# Patient Record
Sex: FEMALE | Race: WHITE | NOT HISPANIC OR LATINO | Employment: FULL TIME | URBAN - NONMETROPOLITAN AREA
[De-identification: names, ages, dates, MRNs, and addresses within clinical notes are randomized per-mention and may not be internally consistent; named-entity substitution may affect disease eponyms.]

---

## 2017-07-11 ENCOUNTER — TRANSFERRED RECORDS (OUTPATIENT)
Dept: HEALTH INFORMATION MANAGEMENT | Facility: OTHER | Age: 52
End: 2017-07-11

## 2017-10-02 ENCOUNTER — TRANSFERRED RECORDS (OUTPATIENT)
Dept: HEALTH INFORMATION MANAGEMENT | Facility: OTHER | Age: 52
End: 2017-10-02

## 2017-10-09 ENCOUNTER — TRANSFERRED RECORDS (OUTPATIENT)
Dept: HEALTH INFORMATION MANAGEMENT | Facility: OTHER | Age: 52
End: 2017-10-09

## 2018-03-01 ENCOUNTER — TELEPHONE (OUTPATIENT)
Dept: FAMILY MEDICINE | Facility: OTHER | Age: 53
End: 2018-03-01

## 2018-03-01 ENCOUNTER — TELEPHONE (OUTPATIENT)
Dept: SURGERY | Facility: OTHER | Age: 53
End: 2018-03-01

## 2018-03-01 ENCOUNTER — OFFICE VISIT (OUTPATIENT)
Dept: FAMILY MEDICINE | Facility: OTHER | Age: 53
End: 2018-03-01
Attending: PHYSICIAN ASSISTANT
Payer: COMMERCIAL

## 2018-03-01 VITALS
BODY MASS INDEX: 21.92 KG/M2 | DIASTOLIC BLOOD PRESSURE: 84 MMHG | HEART RATE: 60 BPM | SYSTOLIC BLOOD PRESSURE: 138 MMHG | WEIGHT: 131.6 LBS | HEIGHT: 65 IN | TEMPERATURE: 98 F

## 2018-03-01 DIAGNOSIS — E55.9 HYPOVITAMINOSIS D: ICD-10-CM

## 2018-03-01 DIAGNOSIS — Z01.419 PAP TEST, AS PART OF ROUTINE GYNECOLOGICAL EXAMINATION: ICD-10-CM

## 2018-03-01 DIAGNOSIS — I48.0 PAROXYSMAL ATRIAL FIBRILLATION (H): ICD-10-CM

## 2018-03-01 DIAGNOSIS — Z23 NEED FOR TDAP VACCINATION: ICD-10-CM

## 2018-03-01 DIAGNOSIS — Z13.220 SCREENING CHOLESTEROL LEVEL: ICD-10-CM

## 2018-03-01 DIAGNOSIS — Z00.00 ROUTINE HISTORY AND PHYSICAL EXAMINATION OF ADULT: Primary | ICD-10-CM

## 2018-03-01 DIAGNOSIS — Z00.00 HEALTHCARE MAINTENANCE: Primary | ICD-10-CM

## 2018-03-01 DIAGNOSIS — Z12.31 ENCOUNTER FOR SCREENING MAMMOGRAM FOR BREAST CANCER: ICD-10-CM

## 2018-03-01 DIAGNOSIS — R15.9 INCONTINENCE OF FECES, UNSPECIFIED FECAL INCONTINENCE TYPE: ICD-10-CM

## 2018-03-01 DIAGNOSIS — M06.9 RHEUMATOID ARTHRITIS, INVOLVING UNSPECIFIED SITE, UNSPECIFIED RHEUMATOID FACTOR PRESENCE: ICD-10-CM

## 2018-03-01 DIAGNOSIS — R19.7 DIARRHEA, UNSPECIFIED TYPE: ICD-10-CM

## 2018-03-01 LAB
ALBUMIN SERPL-MCNC: 4 G/DL (ref 3.5–5.7)
ALP SERPL-CCNC: 73 U/L (ref 34–104)
ALT SERPL W P-5'-P-CCNC: 11 U/L (ref 7–52)
ANION GAP SERPL CALCULATED.3IONS-SCNC: 5 MMOL/L (ref 3–14)
AST SERPL W P-5'-P-CCNC: 14 U/L (ref 13–39)
BILIRUB SERPL-MCNC: 0.2 MG/DL (ref 0.3–1)
BUN SERPL-MCNC: 20 MG/DL (ref 7–25)
CALCIUM SERPL-MCNC: 9.5 MG/DL (ref 8.6–10.3)
CHLORIDE SERPL-SCNC: 107 MMOL/L (ref 98–107)
CHOLEST SERPL-MCNC: 179 MG/DL
CO2 SERPL-SCNC: 28 MMOL/L (ref 21–31)
CREAT SERPL-MCNC: 0.72 MG/DL (ref 0.6–1.2)
DEPRECATED CALCIDIOL+CALCIFEROL SERPL-MC: 29.4 NG/ML
ERYTHROCYTE [DISTWIDTH] IN BLOOD BY AUTOMATED COUNT: 13.3 % (ref 10–15)
GFR SERPL CREATININE-BSD FRML MDRD: 85 ML/MIN/1.7M2
GLUCOSE SERPL-MCNC: 90 MG/DL (ref 70–105)
HCT VFR BLD AUTO: 36.3 % (ref 35–47)
HDLC SERPL-MCNC: 54 MG/DL (ref 23–92)
HGB BLD-MCNC: 12.2 G/DL (ref 11.7–15.7)
LDLC SERPL CALC-MCNC: 114 MG/DL
MCH RBC QN AUTO: 30.7 PG (ref 26.5–33)
MCHC RBC AUTO-ENTMCNC: 33.6 G/DL (ref 31.5–36.5)
MCV RBC AUTO: 91 FL (ref 78–100)
NONHDLC SERPL-MCNC: 125 MG/DL
PLATELET # BLD AUTO: 259 10E9/L (ref 150–450)
POTASSIUM SERPL-SCNC: 4.4 MMOL/L (ref 3.5–5.1)
PROT SERPL-MCNC: 8.1 G/DL (ref 6.4–8.9)
RBC # BLD AUTO: 3.97 10E12/L (ref 3.8–5.2)
SODIUM SERPL-SCNC: 140 MMOL/L (ref 134–144)
TRIGL SERPL-MCNC: 57 MG/DL
WBC # BLD AUTO: 6 10E9/L (ref 4–11)

## 2018-03-01 PROCEDURE — G0463 HOSPITAL OUTPT CLINIC VISIT: HCPCS

## 2018-03-01 PROCEDURE — 82306 VITAMIN D 25 HYDROXY: CPT | Performed by: PHYSICIAN ASSISTANT

## 2018-03-01 PROCEDURE — G0123 SCREEN CERV/VAG THIN LAYER: HCPCS | Performed by: PHYSICIAN ASSISTANT

## 2018-03-01 PROCEDURE — 90471 IMMUNIZATION ADMIN: CPT

## 2018-03-01 PROCEDURE — 85027 COMPLETE CBC AUTOMATED: CPT | Performed by: PHYSICIAN ASSISTANT

## 2018-03-01 PROCEDURE — 36415 COLL VENOUS BLD VENIPUNCTURE: CPT | Performed by: PHYSICIAN ASSISTANT

## 2018-03-01 PROCEDURE — 80061 LIPID PANEL: CPT | Performed by: PHYSICIAN ASSISTANT

## 2018-03-01 PROCEDURE — 80053 COMPREHEN METABOLIC PANEL: CPT | Performed by: PHYSICIAN ASSISTANT

## 2018-03-01 PROCEDURE — 90715 TDAP VACCINE 7 YRS/> IM: CPT | Performed by: PHYSICIAN ASSISTANT

## 2018-03-01 PROCEDURE — 88142 CYTOPATH C/V THIN LAYER: CPT | Mod: ZL | Performed by: PHYSICIAN ASSISTANT

## 2018-03-01 PROCEDURE — 87624 HPV HI-RISK TYP POOLED RSLT: CPT | Performed by: PHYSICIAN ASSISTANT

## 2018-03-01 PROCEDURE — 99386 PREV VISIT NEW AGE 40-64: CPT | Mod: 25 | Performed by: PHYSICIAN ASSISTANT

## 2018-03-01 RX ORDER — METOPROLOL TARTRATE 50 MG
TABLET ORAL
Qty: 60 TABLET | Refills: 1 | COMMUNITY
Start: 2018-03-01 | End: 2018-03-01

## 2018-03-01 RX ORDER — METOPROLOL TARTRATE 25 MG/1
25 TABLET, FILM COATED ORAL DAILY
COMMUNITY
End: 2018-03-01

## 2018-03-01 RX ORDER — MELOXICAM 15 MG/1
15 TABLET ORAL DAILY
COMMUNITY
End: 2018-03-01

## 2018-03-01 RX ORDER — METOPROLOL TARTRATE 25 MG/1
25 TABLET, FILM COATED ORAL DAILY
Qty: 90 TABLET | Refills: 3 | Status: ON HOLD | OUTPATIENT
Start: 2018-03-01 | End: 2018-06-28

## 2018-03-01 RX ORDER — METOPROLOL TARTRATE 50 MG
TABLET ORAL
Qty: 60 TABLET | Refills: 1 | Status: SHIPPED | OUTPATIENT
Start: 2018-03-01 | End: 2018-08-08

## 2018-03-01 RX ORDER — MELOXICAM 15 MG/1
15 TABLET ORAL DAILY
Qty: 90 TABLET | Refills: 3 | Status: ON HOLD | OUTPATIENT
Start: 2018-03-01 | End: 2018-06-28

## 2018-03-01 RX ORDER — METOPROLOL TARTRATE 50 MG
TABLET ORAL
Qty: 60 TABLET | Refills: 1 | Status: SHIPPED | OUTPATIENT
Start: 2018-03-01 | End: 2018-03-01

## 2018-03-01 NOTE — TELEPHONE ENCOUNTER
Patient referred by Leela Carrion to General Surgery for diarrhea and incontinence of feces . Would you    like to see her in clinic first? Please advise.  Thank you.  Sarah Rico on 3/1/2018 at 9:03 AM

## 2018-03-01 NOTE — TELEPHONE ENCOUNTER
Screening Questions for the Scheduling of Screening Colonoscopies   (If Colonoscopy is diagnostic, Provider should review the chart before scheduling.)  Are you younger than 50 or older than 80?  No   Do you take aspirin or fish oil?  No  (if yes, tell patient to stop 1 week prior to Colonoscopy)  Do you take warfarin (Coumadin), clopidogrel (Plavix), apixaban (Eliquis), dabigatram (Pradaxa), rivaroxaban (Xarelto) or any blood thinner? No   Do you use oxygen at home?  NO   Do you have kidney disease? NO  Are you on dialysis? NO   Have you had a stroke or heart attack in the last year? NO   Have you had a stent in your heart or any blood vessel in the last year? NO   Have you had a transplant of any organ? NO   Have you had a colonoscopy or upper endoscopy (EGD) before? NO          When?  NO  Date of scheduled Colonoscopy. 03/12/2018  Provider KISHAN   Pharmacy [facility]

## 2018-03-01 NOTE — TELEPHONE ENCOUNTER
Pharmacy asking about PRN metoprolol They would like a frequency on this order.  MIMA Bronson........................3/1/2018  11:46 AM

## 2018-03-01 NOTE — PATIENT INSTRUCTIONS
"Referred to general surgery for a consult and colonoscopy.     Referred to rheumatology.     Referred for mammogram.     Labs pending.     Gave tetanus vaccine.     Healthy Strategies  1. Eat at least 3 meals a day and never skip breakfast.  2. Eat more slowly.  3. Decrease portion size.  4. Provide structure by using meal replacement bars or shakes, and/or low calorie frozenmeals.  5. For good nutrition incorporate fruit, vegetables, whole grains, lean protein, and low-fat dairy.  6. Remove trigger foods from yourenvironment to avoid impulse eating.  7. Increase physical activity: get a pedometer and aim for 10,000 steps a day or 30-35 minutes of activity 5 days per week.  8. Weigh yourself daily or at least weekly.  9. Keep a record of what you eat and your activity.  10. Establish a support system such as afriend, group or program.    11. Read Kelby Guevara's \"Eat to Live\". Remember it is important to have a minimum of 1200 calories a day, okay to use olive oil, 40 grams of fiber daily. No more than two servings (the size of your palm) of red meat a week.     Please consider the following general health recommendations:    Schedule a mammogram annually starting at the age of 40 years old unless recommended earlier by your primary care provider. Come for a general exam once yearly.    Eat a quality diet (generally, low in simple sugars, starches, cholesterol and saturated fat.)    Please get 1500 mg of calcium in divided doses with 1500 units vitamin D in your diet daily. Take supplements as needed to obtain full recommended amounts.     Stay physically active. Regular walking or other exercise is one of the best ways to minimize pain of arthritis; maintain independence and mobility; maintain bone strength; maintain conditioning of your heart. Find something you enjoy and a friend to do it with you.    Maintain ideal weight. Your Body mass index is Body mass index is 21.73 kg/(m^2).. Generally a BMI of 20-25 is " considered ideal. Overweight is defined as 25-30, obese is 30-35 and markedly obese is greater than 35.    Apply sun block (SPF 25 or greater) on exposed skin anytime you are out in the sun to prevent skin cancer.     Wear a seatbelt whenever you are in a car.    Consider a bone density study every 2-5 years to see if you are at increased risk for fracture. If you have osteoporosis, medicine to strengthen your bones can significantly reduce your risk of fracture, back pain, and loss of height.    Colonoscopy (an exam of the colon) is recommended every 10 years after the age of 50 to screen for colon cancer. (More often if you are at increased risk.)    Obtain a flu shot every fall.    Receive a pneumonia shot series after the age of 65 (repeat in 5 years if you have other risk factors). This does not prevent all types of pneumonia, but reduces the risk of the worst bacterial causes of pneumonia.    You should have a tetanus booster at least once every 10 years.    A vaccine to reduce your chances of getting shingles is available if you are over 50. Information about the vaccine is available through the clinic or at:  (http://www.nlm.nih.gov/medlineplus/druginfo/medmaster/a557683.html)    Check blood sugar annually. Cholesterol annually unless you have had a normal level when last checked within 5 years.     I recommend that you have a general physical exam every year. You should have a pap test every 3 years between the ages of 21 and 30 and every 3-5 years between the ages of 30 and 65 depending on your test unless you have had previous abnormal pap smears, (in these cases the exams and PAP's should be done on a schedule as recommended by your primary care provider). If you have had hysterectomy in the past, your future Pap plan may be different.

## 2018-03-01 NOTE — LETTER
Isabel Antione  [address] [place] [id number]    3/6/2018      Dear Ms. Talbot,      We've received the results back from the laboratory for the samples you gave in clinic.      Your white blood cell count, hemoglobin, electrolytes, kidney function, liver function, and cholesterol are normal.    Your vitamin D was on the lower end of normal.  I would recommend taking in 1500 units of vitamin D daily in order to increase her supply.    Your Pap test and HPV test are normal.  Please have this important test repeated in 5 years.    Please contact us at [phone] with any questions or concerns that you have.    I attached your lab results for your records.        Take Care,         Leela Carrion PA-C    Resulted Orders   CBC with platelets   Result Value Ref Range    WBC 6.0 4.0 - 11.0 10e9/L    RBC Count 3.97 3.8 - 5.2 10e12/L    Hemoglobin 12.2 11.7 - 15.7 g/dL    Hematocrit 36.3 35.0 - 47.0 %    MCV 91 78 - 100 fl    MCH 30.7 26.5 - 33.0 pg    MCHC 33.6 31.5 - 36.5 g/dL    RDW 13.3 10.0 - 15.0 %    Platelet Count 259 150 - 450 10e9/L   Comprehensive metabolic panel (BMP + Alb, Alk Phos, ALT, AST, Total. Bili, TP)   Result Value Ref Range    Sodium 140 134 - 144 mmol/L    Potassium 4.4 3.5 - 5.1 mmol/L    Chloride 107 98 - 107 mmol/L    Carbon Dioxide 28 21 - 31 mmol/L    Anion Gap 5 3 - 14 mmol/L    Glucose 90 70 - 105 mg/dL    Urea Nitrogen 20 7 - 25 mg/dL    Creatinine 0.72 0.60 - 1.20 mg/dL    GFR Estimate 85 >60 mL/min/1.7m2    GFR Estimate If Black >90 >60 mL/min/1.7m2    Calcium 9.5 8.6 - 10.3 mg/dL    Bilirubin Total 0.2 (L) 0.3 - 1.0 mg/dL    Albumin 4.0 3.5 - 5.7 g/dL    Protein Total 8.1 6.4 - 8.9 g/dL    Alkaline Phosphatase 73 34 - 104 U/L    ALT 11 7 - 52 U/L    AST 14 13 - 39 U/L   Vitamin D Total GH   Result Value Ref Range    Vitamin D Total 29.4 ng/mL      Comment:      Comments:  Deficiency:             <10 ng/mL  Insufficiency:        10-29 ng/mL  Sufficiency:           ng/mL  Possible Toxicity:     >100 ng/mL     HPV High Risk Types DNA Cervical   Result Value Ref Range    HPV Source ThinPrep       Comment:      CORRECTED ON 03/06 AT 0725: PREVIOUSLY REPORTED AS [facility]    HPV 16 DNA Negative NEG^Negative    HPV 18 DNA Negative NEG^Negative    Other HR HPV Negative NEG^Negative    Final Diagnosis This patient's sample is negative for HPV DNA.       Comment:      (Note)  METHODOLOGY:  The Roche edith 4800 system uses automated extraction,   simultaneous amplification of HPV (L1 region) and beta-globin,    followed by  real time detection of fluorescent labeled HPV and beta   globin using specific oligonucleotide probes . The test specifically   identifies types HPV 16 DNA and HPV 18 DNA while concurrently   detecting the rest of the high risk types (31, 33, 35, 39, 45, 51,   52, 56, 58, 59, 66 or 68).  COMMENTS:  This test is not intended for use as a screening device   for women under age 30 with normal cervical cytology.  Results should   be correlated with cytologic and histologic findings. Close clinical   followup is recommended.      Specimen Description Cervical Cells       Comment:      BG18 [id number]   Lipid Profile (Chol, Trig, HDL, LDL calc) - FASTING   Result Value Ref Range    Cholesterol 179 <200 mg/dL    Triglycerides 57 <150 mg/dL    HDL Cholesterol 54 23 - 92 mg/dL    LDL Cholesterol Calculated 114 (H) <100 mg/dL      Comment:      Above desirable:  100-129 mg/dl  Borderline High:  130-159 mg/dL  High:             160-189 mg/dL  Very high:       >189 mg/dl      Non HDL Cholesterol 125 <130 mg/dL

## 2018-03-01 NOTE — MR AVS SNAPSHOT
"              After Visit Summary   3/1/2018    Isabel Talbot    MRN: [id number]           Patient Information     Date Of Birth          1965        Visit Information        Provider Department      3/1/2018 8:00 AM Leela Carrion PA-C [facility]        Today's Diagnoses     Routine history and physical examination of adult    -  1    Paroxysmal atrial fibrillation (H)        Rheumatoid arthritis, involving unspecified site, unspecified rheumatoid factor presence (H)        Hypovitaminosis D        Need for Tdap vaccination        Diarrhea, unspecified type        Incontinence of feces, unspecified fecal incontinence type        Encounter for screening mammogram for breast cancer        Pap test, as part of routine gynecological examination        Screening cholesterol level          Care Instructions    Referred to general surgery for a consult and colonoscopy.     Referred to rheumatology.     Referred for mammogram.     Labs pending.     Gave tetanus vaccine.     Healthy Strategies  1. Eat at least 3 meals a day and never skip breakfast.  2. Eat more slowly.  3. Decrease portion size.  4. Provide structure by using meal replacement bars or shakes, and/or low calorie frozenmeals.  5. For good nutrition incorporate fruit, vegetables, whole grains, lean protein, and low-fat dairy.  6. Remove trigger foods from yourenvironment to avoid impulse eating.  7. Increase physical activity: get a pedometer and aim for 10,000 steps a day or 30-35 minutes of activity 5 days per week.  8. Weigh yourself daily or at least weekly.  9. Keep a record of what you eat and your activity.  10. Establish a support system such as afriend, group or program.    11. Read Kelby Guevara's \"Eat to Live\". Remember it is important to have a minimum of 1200 calories a day, okay to use olive oil, 40 grams of fiber daily. No more than two servings (the size of your palm) of red meat a week.     Please consider the " following general health recommendations:    Schedule a mammogram annually starting at the age of 40 years old unless recommended earlier by your primary care provider. Come for a general exam once yearly.    Eat a quality diet (generally, low in simple sugars, starches, cholesterol and saturated fat.)    Please get 1500 mg of calcium in divided doses with 1500 units vitamin D in your diet daily. Take supplements as needed to obtain full recommended amounts.     Stay physically active. Regular walking or other exercise is one of the best ways to minimize pain of arthritis; maintain independence and mobility; maintain bone strength; maintain conditioning of your heart. Find something you enjoy and a friend to do it with you.    Maintain ideal weight. Your Body mass index is Body mass index is 21.73 kg/(m^2).. Generally a BMI of 20-25 is considered ideal. Overweight is defined as 25-30, obese is 30-35 and markedly obese is greater than 35.    Apply sun block (SPF 25 or greater) on exposed skin anytime you are out in the sun to prevent skin cancer.     Wear a seatbelt whenever you are in a car.    Consider a bone density study every 2-5 years to see if you are at increased risk for fracture. If you have osteoporosis, medicine to strengthen your bones can significantly reduce your risk of fracture, back pain, and loss of height.    Colonoscopy (an exam of the colon) is recommended every 10 years after the age of 50 to screen for colon cancer. (More often if you are at increased risk.)    Obtain a flu shot every fall.    Receive a pneumonia shot series after the age of 65 (repeat in 5 years if you have other risk factors). This does not prevent all types of pneumonia, but reduces the risk of the worst bacterial causes of pneumonia.    You should have a tetanus booster at least once every 10 years.    A vaccine to reduce your chances of getting shingles is available if you are over 50. Information about the vaccine is  available through the clinic or at:  (http://www.nlm.nih.gov/medlineplus/druginfo/medmaster/r228247.html)    Check blood sugar annually. Cholesterol annually unless you have had a normal level when last checked within 5 years.     I recommend that you have a general physical exam every year. You should have a pap test every 3 years between the ages of 21 and 30 and every 3-5 years between the ages of 30 and 65 depending on your test unless you have had previous abnormal pap smears, (in these cases the exams and PAP's should be done on a schedule as recommended by your primary care provider). If you have had hysterectomy in the past, your future Pap plan may be different.                   Follow-ups after your visit        Additional Services     GASTROENTEROLOGY ADULT REF PROCEDURE ONLY Other ([place])       Last Lab Result: No results found for: CR  Body mass index is 21.73 kg/(m^2).     Needed:  No  Language:  English    Patient will be contacted to schedule procedure.     Please be aware that coverage of these services is subject to the terms and limitations of your health insurance plan.  Call member services at your health plan with any benefit or coverage questions.  Any procedures must be performed at a [facility] facility OR coordinated by your clinic's referral office.    Please bring the following with you to your appointment:    (1) Any X-Rays, CTs or MRIs which have been performed.  Contact the facility where they were done to arrange for  prior to your scheduled appointment.    (2) List of current medications   (3) This referral request   (4) Any documents/labs given to you for this referral            GENERAL SURG ADULT REFERRAL       Your provider has referred you to: [facility]: [facility] [phone]   [email or web address]    Please be aware that coverage of these services is subject to the terms and limitations of your health insurance  plan.  Call member services at your health plan with any benefit or coverage questions.      Please bring the following with you to your appointment:    (1) Any X-Rays, CTs or MRIs which have been performed.  Contact the facility where they were done to arrange for  prior to your scheduled appointment.   (2) List of current medications   (3) This referral request   (4) Any documents/labs given to you for this referral            RHEUMATOLOGY REFERRAL       Your provider has referred you to: [facility]    Please be aware that coverage of these services is subject to the terms and limitations of your health insurance plan.  Call member services at your health plan with any benefit or coverage questions.      Please bring the following with you to your appointment:    (1) Any X-Rays, CTs or MRIs which have been performed.  Contact the facility where they were done to arrange for  prior to your scheduled appointment.    (2) List of current medications   (3) This referral request   (4) Any documents/labs given to you for this referral                  Follow-up notes from your care team     Return in about 1 year (around 3/1/2019) for Physical Exam.      Your next 10 appointments already scheduled     Mar 01, 2018  1:50 PM CST   New Visit with Gerard Travis MD   [facility] ([facility]    [address]  [facility] [id number] [phone]              Future tests that were ordered for you today     Open Future Orders        Priority Expected Expires Ordered    Clostridium difficile Toxin B PCR Routine  3/31/2018 3/1/2018    Giardia antigen Routine  3/1/2019 3/1/2018    H Pylori antigen, stool Routine  3/31/2018 3/1/2018    Stool culture SSCE Routine  3/1/2019 3/1/2018    Ova and Parasite Exam Routine Routine  3/1/2019 3/1/2018    MA Screening Digital Bilateral Routine  3/1/2019 3/1/2018            Who to contact     If you have questions or need  "follow up information about today's clinic visit or your schedule please contact [facility] directly at [phone].  Normal or non-critical lab and imaging results will be communicated to you by [employer], letter or phone within 4 business days after the clinic has received the results. If you do not hear from us within 7 days, please contact the clinic through [employer] or phone. If you have a critical or abnormal lab result, we will notify you by phone as soon as possible.  Submit refill requests through [employer] or call your pharmacy and they will forward the refill request to us. Please allow 3 business days for your refill to be completed.          Additional Information About Your Visit        [employer] Information     [employer]at lets you send messages to your doctor, view your test results, renew your prescriptions, schedule appointments and more. To sign up, go to [email or web address] . Click on \"Log in\" on the left side of the screen, which will take you to the Welcome page. Then click on \"Sign up Now\" on the right side of the page.     You will be asked to enter the access code listed below, as well as some personal information. Please follow the directions to create your username and password.     Your access code is: [email or web address]  Expires: 2018  9:24 AM     Your access code will  in 90 days. If you need help or a new code, please call your [facility] clinic or [phone].        Care EveryWhere ID     This is your Care EveryWhere ID. This could be used by other organizations to access your [facility] medical records  [id number]        Your Vitals Were     Pulse Temperature Height Breastfeeding? BMI (Body Mass Index)       60 98  F (36.7  C) (Tympanic) 1.657 m (5' 5.25\") No 21.73 kg/m2        Blood Pressure from Last 3 Encounters:   18 138/84    Weight from Last 3 Encounters:   18 59.7 kg (131 lb 9.6 oz)              We Performed the Following     CBC with platelets "     Comprehensive metabolic panel (BMP + Alb, Alk Phos, ALT, AST, Total. Bili, TP)     GASTROENTEROLOGY ADULT REF PROCEDURE ONLY Other ([facility])     GENERAL SURG ADULT REFERRAL     HPV High Risk Types DNA Cervical     Lipid Profile (Chol, Trig, HDL, LDL calc) - FASTING     Pap Screen Thin Prep with HPV - recommended age 30 - 65 years (select HPV order below)     RHEUMATOLOGY REFERRAL     TDAP VACCINE (BOOSTRIX)     Vitamin D Total GH          Where to get your medicines      These medications were sent to [facility] [address] [address]     Phone:  [phone]     meloxicam 15 MG tablet    metoprolol tartrate 25 MG tablet    metoprolol tartrate 50 MG tablet          Primary Care Provider Fax #    Physician No Ref-Primary [phone]       No address on file        Equal Access to Services     JERAD CARLSON : Delmy Donaldson, waaxtravis luantoniaadaha, qaybayo kaalkit dickens, dionne sabillon . So [facility] [phone].    ATENCIÓN: Si habla español, tiene a golden disposición servicios gratuitos de asistencia lingüística. Raymond al [phone].    We comply with applicable federal civil rights laws and [place] laws. We do not discriminate on the basis of race, color, national origin, age, disability, sex, sexual orientation, or gender identity.            Thank you!     Thank you for choosing [facility]  for your care. Our goal is always to provide you with excellent care. Hearing back from our patients is one way we can continue to improve our services. Please take a few minutes to complete the written survey that you may receive in the mail after your visit with us. Thank you!             Your Updated Medication List - Protect others around you: Learn how to safely use, store and throw away your medicines at www.disposemymeds.org.          This list is accurate as of 3/1/18  9:24  AM.  Always use your most recent med list.                   Brand Name Dispense Instructions for use Diagnosis    meloxicam 15 MG tablet    MOBIC    90 tablet    Take 1 tablet (15 mg) by mouth daily    Rheumatoid arthritis, involving unspecified site, unspecified rheumatoid factor presence (H)       * metoprolol tartrate 25 MG tablet    LOPRESSOR    90 tablet    Take 1 tablet (25 mg) by mouth daily    Paroxysmal atrial fibrillation (H)       * metoprolol tartrate 50 MG tablet    LOPRESSOR    60 tablet    Take 1 tab PO prn racing heart    Paroxysmal atrial fibrillation (H)       * Notice:  This list has 2 medication(s) that are the same as other medications prescribed for you. Read the directions carefully, and ask your doctor or other care provider to review them with you.

## 2018-03-01 NOTE — NURSING NOTE
Patient presents to the clinic for   physical  She also would like to discuss bowel incontinence.  MIMA Bronson........................3/1/2018  8:08 AM

## 2018-03-01 NOTE — PROGRESS NOTES
Nursing Notes:   Adela Merchant LPN  3/1/2018  8:20 AM  Signed  Patient presents to the clinic for   physical  She also would like to discuss bowel incontinence.  MIMA Bronson........................3/1/2018  8:08 AM      ANNUAL PHYSICAL - FEMALE    HPI: [id number] who presents for a yearly exam.  Concerns include: stomach concerns.  Gas in bowels. Diarrhea and accidents.  Wake up going #2. Started in November 2017.  Every day.  No blood in stool.  Have diarrhea approximately 15x/day.  No stomachaches, nausea, vomiting.  Eating and drinking normally.  Staying well-hydrated.  She barely has the sensation that she is going to have a bowel movement before she has an accident.  She wakes up in the nighttime after she has had an accident.  She is currently having to wear depends due to the symptoms.  No new foods or recent travel.  She did start meloxicam around October for the rheumatoid arthritis.  No blood in the stools or black tarry stools.  She had a flare of her atrial fibrillation in November.  She discontinued rythmol in November and was started on metoprolol.    Diagnosed with RA. Take meloxicam for the pain.  Diagnosed at the end of October.      Hx A fib - dx Nov.  Diagnosed with a fib 4-5 years ago.  Seen cardiology in Oct 2017. Had a fib in Oct. Hx rythmol TID.  This was switched to metoprolol in November.  No recent chest pain, palpitations, problems breathing, racing heart.  No cough or cold symptoms.    Hx hypovitaminosis D.  She would like her labs rechecked.  She states that she is not very diligent at taking vitamin D.    No LMP recorded. Patient is postmenopausal.   Contraception: postmenopausal  Risk for STI?: no  Last pap: 2010 - nl  Any hx of abnormal paps:  no  FH of early CA?: no  Cholesterol/DM concerns/screening: previous testing  Tobacco?: yes  Calcium intake: yes  DEXA: na  Last mammo: 2010  Colonoscopy: no  Immunizations: need Tdap    Patient Active Problem List    Diagnosis Date  Noted     Rheumatoid arthritis, involving unspecified site, unspecified rheumatoid factor presence (H) 03/01/2018     Priority: Medium     Paroxysmal atrial fibrillation (H) 03/01/2018     Priority: Medium     Hypovitaminosis D 03/01/2018     Priority: Medium     Diarrhea, unspecified type 03/01/2018     Priority: Medium     Incontinence of feces, unspecified fecal incontinence type 03/01/2018     Priority: Medium       History reviewed. No pertinent past medical history.    Past Surgical History:   Procedure Laterality Date     HAND SURGERY      s/p fall       Family History   Problem Relation Age of Onset     CANCER Mother      throat, tonsillar cancer     HEART DISEASE Father      Hypertension Father      Hyperlipidemia Father        Social History     Social History     Marital status: Single     Spouse name: N/A     Number of children: N/A     Years of education: N/A     Occupational History     Not on file.     Social History Main Topics     Smoking status: Current Every Day Smoker     Packs/day: 0.50     Years: 38.00     Types: Cigarettes     Smokeless tobacco: Never Used     Alcohol use Yes      Comment: occasional.     Drug use: No     Sexual activity: Not Currently     Other Topics Concern     Not on file     Social History Narrative     No narrative on file       Current Outpatient Prescriptions   Medication Sig Dispense Refill     metoprolol tartrate (LOPRESSOR) 50 MG tablet Take 1 tab PO every 2 hours prn racing heart 60 tablet 1     metoprolol tartrate (LOPRESSOR) 25 MG tablet Take 1 tablet (25 mg) by mouth daily 90 tablet 3     meloxicam (MOBIC) 15 MG tablet Take 1 tablet (15 mg) by mouth daily 90 tablet 3     [DISCONTINUED] metoprolol tartrate (LOPRESSOR) 25 MG tablet Take 25 mg by mouth daily       [DISCONTINUED] meloxicam (MOBIC) 15 MG tablet Take 15 mg by mouth daily       [DISCONTINUED] metoprolol tartrate (LOPRESSOR) 50 MG tablet Take 1 tab PO prn racing heart 60 tablet 1     [DISCONTINUED]  "metoprolol tartrate (LOPRESSOR) 50 MG tablet Take 1 tab PO prn racing heart 60 tablet 1       No Known Allergies    REVIEW OF SYSTEMS:  Refer to HPI.    PHYSICAL EXAM:  /84 (BP Location: Right arm, Patient Position: Sitting, Cuff Size: Adult Regular)  Pulse 60  Temp 98  F (36.7  C) (Tympanic)  Ht 5' 5.25\" (1.657 m)  Wt 131 lb 9.6 oz (59.7 kg)  Breastfeeding? No  BMI 21.73 kg/m2  CONSTITUTIONAL:  Alert,cooperative, NAD.  EYES: No scleral icterus.  PERRLA.  Conjunctiva clear.  ENT/MOUTH: External ears and nose normal.  TMs normal.  Moist mucous membranes. Oropharynx clear.    ENDO: No thyromegaly or thyroidnodules.  LYMPH:  No cervical or supraclavicular LA.    BREASTS: No skin abnormalities, no erythema.  No discrete masses.  No nipple discharge, no axillary, supra- or infraclavicular LA.   CARDIOVASCULAR: Regular,S1, S2.  No S3 or S4.  No murmur/gallop/rub.  No peripheral edema.  RESPIRATORY: CTA bilaterally, no wheezes, rhonchi or rales.  GI: Bowel sounds wnl.  Soft, nontender, nondistended.  No masses or HSM.  No rebound orguarding.  : Vulva: normal, no lesions or discharge  Urethral meatus: normal size and location, no lesions or discharge  Urethra: no tenderness or masses  Bladder: no fullness or tenderness  Vagina: normalappearance, no abnormal discharge, no lesions.  No evidence of cystocele or rectocele.  Cervix: normal appearance, no lesions, no abnormal discharge, no cervical motion tenderness  Uterus: normal size and position,mobile, non-tender  Adnexa: no palpable masses bilaterally. No cervical motion tenderness.  Pap smear obtained: yes  MSKEL: Grossly normal ROM.  Noclubbing.  INTEGUMENTARY:  Warm, dry.  No rash noted on exposed skin.  NEUROLOGIC: Facies symmetric.  Grossly normal movement and tone.  No tremor.  PSYCHIATRIC: Affect normal.  Speech fluent.      PHQ Depression Screen  No flowsheet data found.    Labs:  Results for orders placed or performed in visit on 03/01/18   CBC with " platelets   Result Value Ref Range    WBC 6.0 4.0 - 11.0 10e9/L    RBC Count 3.97 3.8 - 5.2 10e12/L    Hemoglobin 12.2 11.7 - 15.7 g/dL    Hematocrit 36.3 35.0 - 47.0 %    MCV 91 78 - 100 fl    MCH 30.7 26.5 - 33.0 pg    MCHC 33.6 31.5 - 36.5 g/dL    RDW 13.3 10.0 - 15.0 %    Platelet Count 259 150 - 450 10e9/L   Comprehensive metabolic panel (BMP + Alb, Alk Phos, ALT, AST, Total. Bili, TP)   Result Value Ref Range    Sodium 140 134 - 144 mmol/L    Potassium 4.4 3.5 - 5.1 mmol/L    Chloride 107 98 - 107 mmol/L    Carbon Dioxide 28 21 - 31 mmol/L    Anion Gap 5 3 - 14 mmol/L    Glucose 90 70 - 105 mg/dL    Urea Nitrogen 20 7 - 25 mg/dL    Creatinine 0.72 0.60 - 1.20 mg/dL    GFR Estimate 85 >60 mL/min/1.7m2    GFR Estimate If Black >90 >60 mL/min/1.7m2    Calcium 9.5 8.6 - 10.3 mg/dL    Bilirubin Total 0.2 (L) 0.3 - 1.0 mg/dL    Albumin 4.0 3.5 - 5.7 g/dL    Protein Total 8.1 6.4 - 8.9 g/dL    Alkaline Phosphatase 73 34 - 104 U/L    ALT 11 7 - 52 U/L    AST 14 13 - 39 U/L   Vitamin D Total GH   Result Value Ref Range    Vitamin D Total 29.4 ng/mL   Lipid Profile (Chol, Trig, HDL, LDL calc) - FASTING   Result Value Ref Range    Cholesterol 179 <200 mg/dL    Triglycerides 57 <150 mg/dL    HDL Cholesterol 54 23 - 92 mg/dL    LDL Cholesterol Calculated 114 (H) <100 mg/dL    Non HDL Cholesterol 125 <130 mg/dL       ASSESSMENT AND PLAN:    1. Routine history and physical examination of adult    2. Paroxysmal atrial fibrillation (H)    3. Rheumatoid arthritis, involving unspecified site, unspecified rheumatoid factor presence (H)    4. Hypovitaminosis D    5. Need for Tdap vaccination    6. Diarrhea, unspecified type    7. Incontinence of feces, unspecified fecal incontinence type    8. Encounter for screening mammogram for breast cancer    9. Pap test, as part of routine gynecological examination    10. Screening cholesterol level        Bowel incontinence and diarrhea:  Referred to general surgery for a consult and  "colonoscopy.   Ordered stool testing.    Rheumatoid arthritis:  Referred to rheumatology.   Continue Mobic until she meets with rheumatology.    Referred for mammogram for breast cancer screening..     Patient had an unremarkable CBC, CMP, vitamin D, cholesterol.    Gave tetanus vaccine.     Atrial fibrillation: Patient is asymptomatic at this time.  Continue to monitor.  Medications refilled.  Return as needed for recheck if she develops new symptoms.    Complete a Pap test.  Results pending.    Patient Instructions   Referred to general surgery for a consult and colonoscopy.     Referred to rheumatology.     Referred for mammogram.     Labs pending.     Gave tetanus vaccine.     Healthy Strategies  1. Eat at least 3 meals a day and never skip breakfast.  2. Eat more slowly.  3. Decrease portion size.  4. Provide structure by using meal replacement bars or shakes, and/or low calorie frozenmeals.  5. For good nutrition incorporate fruit, vegetables, whole grains, lean protein, and low-fat dairy.  6. Remove trigger foods from yourenvironment to avoid impulse eating.  7. Increase physical activity: get a pedometer and aim for 10,000 steps a day or 30-35 minutes of activity 5 days per week.  8. Weigh yourself daily or at least weekly.  9. Keep a record of what you eat and your activity.  10. Establish a support system such as afriend, group or program.    11. Read Kelby Guevara's \"Eat to Live\". Remember it is important to have a minimum of 1200 calories a day, okay to use olive oil, 40 grams of fiber daily. No more than two servings (the size of your palm) of red meat a week.     Please consider the following general health recommendations:    Schedule a mammogram annually starting at the age of 40 years old unless recommended earlier by your primary care provider. Come for a general exam once yearly.    Eat a quality diet (generally, low in simple sugars, starches, cholesterol and saturated fat.)    Please get 1500 mg " of calcium in divided doses with 1500 units vitamin D in your diet daily. Take supplements as needed to obtain full recommended amounts.     Stay physically active. Regular walking or other exercise is one of the best ways to minimize pain of arthritis; maintain independence and mobility; maintain bone strength; maintain conditioning of your heart. Find something you enjoy and a friend to do it with you.    Maintain ideal weight. Your Body mass index is Body mass index is 21.73 kg/(m^2).. Generally a BMI of 20-25 is considered ideal. Overweight is defined as 25-30, obese is 30-35 and markedly obese is greater than 35.    Apply sun block (SPF 25 or greater) on exposed skin anytime you are out in the sun to prevent skin cancer.     Wear a seatbelt whenever you are in a car.    Consider a bone density study every 2-5 years to see if you are at increased risk for fracture. If you have osteoporosis, medicine to strengthen your bones can significantly reduce your risk of fracture, back pain, and loss of height.    Colonoscopy (an exam of the colon) is recommended every 10 years after the age of 50 to screen for colon cancer. (More often if you are at increased risk.)    Obtain a flu shot every fall.    Receive a pneumonia shot series after the age of 65 (repeat in 5 years if you have other risk factors). This does not prevent all types of pneumonia, but reduces the risk of the worst bacterial causes of pneumonia.    You should have a tetanus booster at least once every 10 years.    A vaccine to reduce your chances of getting shingles is available if you are over 50. Information about the vaccine is available through the clinic or at:  (http://www.nlm.nih.gov/medlineplus/druginfo/medmaster/s737444.html)    Check blood sugar annually. Cholesterol annually unless you have had a normal level when last checked within 5 years.     I recommend that you have a general physical exam every year. You should have a pap test every 3  years between the ages of 21 and 30 and every 3-5 years between the ages of 30 and 65 depending on your test unless you have had previous abnormal pap smears, (in these cases the exams and PAP's should be done on a schedule as recommended by your primary care provider). If you have had hysterectomy in the past, your future Pap plan may be different.       Relevant cancer screening discussed.    Counseled on healthy diet, Calcium and vitamin D intake, and exercise.    Leela Carrion ....................  3/1/2018   8:20 AM

## 2018-03-02 DIAGNOSIS — R15.9 INCONTINENCE OF FECES, UNSPECIFIED FECAL INCONTINENCE TYPE: ICD-10-CM

## 2018-03-02 DIAGNOSIS — R19.7 DIARRHEA, UNSPECIFIED TYPE: ICD-10-CM

## 2018-03-02 LAB
C DIFF TOX B STL QL: NEGATIVE
G LAMBLIA AG STL QL IA: NORMAL
H PYLORI AG STL QL IA: NORMAL
SPECIMEN SOURCE: NORMAL

## 2018-03-02 PROCEDURE — 87329 GIARDIA AG IA: CPT | Performed by: PHYSICIAN ASSISTANT

## 2018-03-02 PROCEDURE — 87046 STOOL CULTR AEROBIC BACT EA: CPT | Performed by: PHYSICIAN ASSISTANT

## 2018-03-02 PROCEDURE — 87338 HPYLORI STOOL AG IA: CPT | Performed by: PHYSICIAN ASSISTANT

## 2018-03-02 PROCEDURE — 87209 SMEAR COMPLEX STAIN: CPT | Performed by: PHYSICIAN ASSISTANT

## 2018-03-02 PROCEDURE — 87045 FECES CULTURE AEROBIC BACT: CPT | Performed by: PHYSICIAN ASSISTANT

## 2018-03-02 PROCEDURE — 87493 C DIFF AMPLIFIED PROBE: CPT | Performed by: PHYSICIAN ASSISTANT

## 2018-03-02 PROCEDURE — 87177 OVA AND PARASITES SMEARS: CPT | Performed by: PHYSICIAN ASSISTANT

## 2018-03-04 LAB
BACTERIA SPEC CULT: NORMAL
E COLI SXT1+2 STL IA: NORMAL
E COLI SXT1+2 STL IA: NORMAL
SPECIMEN SOURCE: NORMAL

## 2018-03-05 LAB
O+P STL MICRO: NORMAL
SPECIMEN SOURCE: NORMAL

## 2018-03-05 RX ORDER — POLYETHYLENE GLYCOL 3350, SODIUM CHLORIDE, SODIUM BICARBONATE, POTASSIUM CHLORIDE 420; 11.2; 5.72; 1.48 G/4L; G/4L; G/4L; G/4L
4000 POWDER, FOR SOLUTION ORAL ONCE
Qty: 4000 ML | Refills: 0 | Status: SHIPPED | OUTPATIENT
Start: 2018-03-05 | End: 2018-03-05

## 2018-03-05 RX ORDER — BISACODYL 5 MG
TABLET, DELAYED RELEASE (ENTERIC COATED) ORAL
Qty: 2 TABLET | Refills: 0 | Status: SHIPPED | OUTPATIENT
Start: 2018-03-05 | End: 2018-03-08

## 2018-03-06 ENCOUNTER — TELEPHONE (OUTPATIENT)
Dept: FAMILY MEDICINE | Facility: OTHER | Age: 53
End: 2018-03-06

## 2018-03-06 LAB
FINAL DIAGNOSIS: NORMAL
HPV HR 12 DNA CVX QL NAA+PROBE: NEGATIVE
HPV16 DNA SPEC QL NAA+PROBE: NEGATIVE
HPV18 DNA SPEC QL NAA+PROBE: NEGATIVE
SPECIMEN DESCRIPTION: NORMAL
SPECIMEN SOURCE CVX/VAG CYTO: NORMAL

## 2018-03-06 NOTE — TELEPHONE ENCOUNTER
Left message to call back.  SEE RESULT NOTE.  Adela Merchant LPN ....................  3/6/2018   1:40 PM

## 2018-03-08 ENCOUNTER — HOSPITAL ENCOUNTER (EMERGENCY)
Facility: OTHER | Age: 53
Discharge: HOME | End: 2018-03-08
Attending: FAMILY MEDICINE | Admitting: FAMILY MEDICINE
Payer: COMMERCIAL

## 2018-03-08 ENCOUNTER — TELEPHONE (OUTPATIENT)
Dept: FAMILY MEDICINE | Facility: OTHER | Age: 53
End: 2018-03-08

## 2018-03-08 VITALS
TEMPERATURE: 97.6 F | OXYGEN SATURATION: 95 % | BODY MASS INDEX: 21.83 KG/M2 | HEIGHT: 65 IN | HEART RATE: 49 BPM | RESPIRATION RATE: 25 BRPM | SYSTOLIC BLOOD PRESSURE: 145 MMHG | DIASTOLIC BLOOD PRESSURE: 80 MMHG | WEIGHT: 131 LBS

## 2018-03-08 DIAGNOSIS — R00.1 BRADYCARDIA: ICD-10-CM

## 2018-03-08 DIAGNOSIS — I10 BENIGN ESSENTIAL HYPERTENSION: ICD-10-CM

## 2018-03-08 DIAGNOSIS — M06.9 RHEUMATOID ARTHRITIS, INVOLVING UNSPECIFIED SITE, UNSPECIFIED RHEUMATOID FACTOR PRESENCE: ICD-10-CM

## 2018-03-08 DIAGNOSIS — Z79.899 ENCOUNTER FOR LONG-TERM (CURRENT) USE OF HIGH-RISK MEDICATION: ICD-10-CM

## 2018-03-08 DIAGNOSIS — F17.210 CIGARETTE SMOKER: ICD-10-CM

## 2018-03-08 LAB
ALBUMIN UR-MCNC: NEGATIVE MG/DL
ANION GAP SERPL CALCULATED.3IONS-SCNC: 8 MMOL/L (ref 3–14)
APPEARANCE UR: CLEAR
BASOPHILS # BLD AUTO: 0.1 10E9/L (ref 0–0.2)
BASOPHILS NFR BLD AUTO: 1 %
BILIRUB UR QL STRIP: NEGATIVE
BUN SERPL-MCNC: 15 MG/DL (ref 7–25)
CALCIUM SERPL-MCNC: 9.2 MG/DL (ref 8.6–10.3)
CHLORIDE SERPL-SCNC: 106 MMOL/L (ref 98–107)
CO2 SERPL-SCNC: 25 MMOL/L (ref 21–31)
COLOR UR AUTO: YELLOW
CREAT SERPL-MCNC: 0.68 MG/DL (ref 0.6–1.2)
DIFFERENTIAL METHOD BLD: ABNORMAL
EOSINOPHIL # BLD AUTO: 0.2 10E9/L (ref 0–0.7)
EOSINOPHIL NFR BLD AUTO: 3.3 %
ERYTHROCYTE [DISTWIDTH] IN BLOOD BY AUTOMATED COUNT: 13.5 % (ref 10–15)
GFR SERPL CREATININE-BSD FRML MDRD: >90 ML/MIN/1.7M2
GLUCOSE SERPL-MCNC: 119 MG/DL (ref 70–105)
GLUCOSE UR STRIP-MCNC: NEGATIVE MG/DL
HCT VFR BLD AUTO: 31.7 % (ref 35–47)
HGB BLD-MCNC: 10.8 G/DL (ref 11.7–15.7)
HGB UR QL STRIP: NEGATIVE
IMM GRANULOCYTES # BLD: 0 10E9/L (ref 0–0.4)
IMM GRANULOCYTES NFR BLD: 0.2 %
KETONES UR STRIP-MCNC: NEGATIVE MG/DL
LEUKOCYTE ESTERASE UR QL STRIP: NEGATIVE
LYMPHOCYTES # BLD AUTO: 2.1 10E9/L (ref 0.8–5.3)
LYMPHOCYTES NFR BLD AUTO: 39.8 %
MCH RBC QN AUTO: 30.9 PG (ref 26.5–33)
MCHC RBC AUTO-ENTMCNC: 34.1 G/DL (ref 31.5–36.5)
MCV RBC AUTO: 91 FL (ref 78–100)
MONOCYTES # BLD AUTO: 0.4 10E9/L (ref 0–1.3)
MONOCYTES NFR BLD AUTO: 8.5 %
NEUTROPHILS # BLD AUTO: 2.5 10E9/L (ref 1.6–8.3)
NEUTROPHILS NFR BLD AUTO: 47.2 %
NITRATE UR QL: NEGATIVE
PH UR STRIP: 6 PH (ref 5–7)
PLATELET # BLD AUTO: 200 10E9/L (ref 150–450)
POTASSIUM SERPL-SCNC: 4 MMOL/L (ref 3.5–5.1)
RBC # BLD AUTO: 3.5 10E12/L (ref 3.8–5.2)
SODIUM SERPL-SCNC: 139 MMOL/L (ref 134–144)
SOURCE: NORMAL
SP GR UR STRIP: 1.02 (ref 1–1.03)
TROPONIN I SERPL-MCNC: <0.03 UG/L (ref 0–0.03)
UROBILINOGEN UR STRIP-ACNC: 0.2 EU/DL (ref 0.2–1)
WBC # BLD AUTO: 5.2 10E9/L (ref 4–11)

## 2018-03-08 PROCEDURE — 36415 COLL VENOUS BLD VENIPUNCTURE: CPT | Performed by: FAMILY MEDICINE

## 2018-03-08 PROCEDURE — 84484 ASSAY OF TROPONIN QUANT: CPT | Performed by: FAMILY MEDICINE

## 2018-03-08 PROCEDURE — 85025 COMPLETE CBC W/AUTO DIFF WBC: CPT | Performed by: FAMILY MEDICINE

## 2018-03-08 PROCEDURE — 25000132 ZZH RX MED GY IP 250 OP 250 PS 637: Performed by: FAMILY MEDICINE

## 2018-03-08 PROCEDURE — 93010 ELECTROCARDIOGRAM REPORT: CPT | Performed by: INTERNAL MEDICINE

## 2018-03-08 PROCEDURE — 80048 BASIC METABOLIC PNL TOTAL CA: CPT | Performed by: FAMILY MEDICINE

## 2018-03-08 PROCEDURE — 81003 URINALYSIS AUTO W/O SCOPE: CPT | Performed by: FAMILY MEDICINE

## 2018-03-08 PROCEDURE — 93005 ELECTROCARDIOGRAM TRACING: CPT

## 2018-03-08 PROCEDURE — 99283 EMERGENCY DEPT VISIT LOW MDM: CPT | Mod: Z6 | Performed by: FAMILY MEDICINE

## 2018-03-08 PROCEDURE — 99284 EMERGENCY DEPT VISIT MOD MDM: CPT | Mod: 25

## 2018-03-08 RX ORDER — LISINOPRIL 5 MG/1
5 TABLET ORAL ONCE
Status: COMPLETED | OUTPATIENT
Start: 2018-03-08 | End: 2018-03-08

## 2018-03-08 RX ORDER — LISINOPRIL 5 MG/1
5 TABLET ORAL DAILY
Qty: 3 TABLET | Refills: 0 | Status: SHIPPED | OUTPATIENT
Start: 2018-03-08 | End: 2018-03-12

## 2018-03-08 RX ADMIN — LISINOPRIL 5 MG: 5 TABLET ORAL at 12:33

## 2018-03-08 NOTE — TELEPHONE ENCOUNTER
Patient states that yesterday morning her blood pressure was 195/110 she states that she took her metoprolol 50 mg (she has this as a PRN for her A-Fib)  Patient states that this brought her bp down to 143/88 but it did the same thing this morning.  Please advise.       Patient notified that per Leela HURD she should go to the ER to be evaluated for this.  MIMA Bronson........................3/8/2018  10:18 AM

## 2018-03-08 NOTE — ED PROVIDER NOTES
History   No chief complaint on file.    HPI  Isabel Talbot is a 52 year old female who is concerned due to 2 consecutive days of BPs at home reading around 190 systolic.      She took an extra metoprolol today and that is likely why her pulse is lower than normal (normal in the 40s).    She has no concerns.  No chest pain, headache, SOB, recent illness, urinary symptoms.    She is not sure why her BPs have been high, but was instructed to visit the ER today.    Problem List:    Patient Active Problem List    Diagnosis Date Noted     Rheumatoid arthritis, involving unspecified site, unspecified rheumatoid factor presence (H) 03/01/2018     Priority: Medium     Paroxysmal atrial fibrillation (H) 03/01/2018     Priority: Medium     Hypovitaminosis D 03/01/2018     Priority: Medium     Diarrhea, unspecified type 03/01/2018     Priority: Medium     Incontinence of feces, unspecified fecal incontinence type 03/01/2018     Priority: Medium        Past Medical History:    No past medical history on file.    Past Surgical History:    Past Surgical History:   Procedure Laterality Date     HAND SURGERY      s/p fall       Family History:    Family History   Problem Relation Age of Onset     CANCER Mother      throat, tonsillar cancer     HEART DISEASE Father      Hypertension Father      Hyperlipidemia Father        Social History:  Marital Status:  Single [1]  Social History   Substance Use Topics     Smoking status: Current Every Day Smoker     Packs/day: 0.50     Years: 38.00     Types: Cigarettes     Smokeless tobacco: Never Used     Alcohol use Yes      Comment: occasional.        Medications:      lisinopril (PRINIVIL/ZESTRIL) 5 MG tablet   metoprolol tartrate (LOPRESSOR) 25 MG tablet   meloxicam (MOBIC) 15 MG tablet   metoprolol tartrate (LOPRESSOR) 50 MG tablet         Review of Systemsno fevers, chills, HEENT, chest symptoms, abdominal concerns    Physical Exam   BP: (!) 161/93  Pulse: (!) 49 (a-fib up to  "73)  Heart Rate: 41  Temp: 97.6  F (36.4  C)  Resp: 30  Height: 165.1 cm (5' 5\")  Weight: 59.4 kg (131 lb)  SpO2: 100 %      Physical Exam  Well woman whose second Bp is 155 systolic.  mentates clearly.  No one-sided neurologic deficits.  Heart reg.  Lungs clear.  Abd soft. No pulsatile abdominal mass.  No DVT signs or ankle edema.  ED Course     ED Course     Procedures  Lab workup is negative for any obvious cause, and no end-organ damage is noted today.           EKG shows sinus kiara with no concerning ST or T changes.    Critical Care time:  none        She is given lisinopril 5mg orally here.       Labs Ordered and Resulted from Time of ED Arrival Up to the Time of Departure from the ED   CBC WITH PLATELETS DIFFERENTIAL - Abnormal; Notable for the following:        Result Value    RBC Count 3.50 (*)     Hemoglobin 10.8 (*)     Hematocrit 31.7 (*)     All other components within normal limits   BASIC METABOLIC PANEL - Abnormal; Notable for the following:     Glucose 119 (*)     All other components within normal limits   TROPONIN I   UA MACROSCOPIC WITH REFLEX TO MICRO       Assessments & Plan (with Medical Decision Making)     I have reviewed the nursing notes.    I have reviewed the findings, diagnosis, plan and need for follow up with the patient.   will prescribe enough lisinopril to get her a follow up appt.  This should not impact her bradycardia.  Revert back to her regular amount of metoprolol    Follow up with her primary this week.    No obvious cause to her hypertensive numbers noted today, and she is near normal here in the ER.    New Prescriptions    LISINOPRIL (PRINIVIL/ZESTRIL) 5 MG TABLET    Take 1 tablet (5 mg) by mouth daily       Final diagnoses:   Benign essential hypertension   asymptomatic bradycardia is in the context of a usual resting HR in the 40s and taking an extra dose of metoprolol today.   She has no symptoms related to this.      3/8/2018   [facility]   "   Ras Torres MD  03/08/18 1223       Ras Torres MD  03/08/18 1243

## 2018-03-08 NOTE — ED NOTES
Started with high blood pressure yesterday.  Today it was 169/99 at home, and was told to come to the ED

## 2018-03-08 NOTE — ED AVS SNAPSHOT
[facility]    [address]    [place] [address]    Phone:  [phone]    Fax:  [phone]                                       Isabel Talbot   MRN: [id number]    Department:  [facility]   Date of Visit:  3/8/2018           After Visit Summary Signature Page     I have received my discharge instructions, and my questions have been answered. I have discussed any challenges I see with this plan with the nurse or doctor.    ..........................................................................................................................................  Patient/Patient Representative Signature      ..........................................................................................................................................  Patient Representative Print Name and Relationship to Patient    ..................................................               ................................................  Date                                            Time    ..........................................................................................................................................  Reviewed by Signature/Title    ...................................................              ..............................................  Date                                                            Time

## 2018-03-08 NOTE — ED AVS SNAPSHOT
[facility]    [address]    [place] [address]    Phone:  [phone]    Fax:  [phone]                                       Isabel Talbot   MRN: [id number]    Department:  [facility]   Date of Visit:  3/8/2018           Patient Information     Date Of Birth          1965        Your diagnoses for this visit were:     Benign essential hypertension        You were seen by Ras Torres MD.      Follow-up Information     Follow up with No Ref-Primary, Physician.    Why:  As needed        Discharge Instructions         Taking Your Blood Pressure  Blood pressure is the force of blood against the artery wall as it moves from the heart through the blood vessels. You can take your own blood pressure reading using a digital monitor. Take your readings the same each time, using the same arm. Take readings as often as your healthcare provider instructs.  About blood pressure monitors  Blood pressure monitors are designed for certain ages and cases. You can find monitors for older adults, for pregnant women, and for children. Make sure the one you choose is the right one for your age and situation.  The American Heart Association recommends an automatic cuff monitor that fits on your upper arm (bicep). The cuff should fit your arm size. A cuff that s too large or too small will not give an accurate reading. Measure around your upper arm to find your size.  Monitors that attach to your finger or wrist are not as accurate as monitors for your upper arm.  Ask your healthcare provider for help in choosing a monitor. Bring your monitor to your next provider visit if you need help in using it the correct way.  The steps below are general instructions for using an automatic digital monitor.  Step 1. Relax      Take your blood pressure at the same time every day, such as in the morning or evening, or at the time your healthcare provider recommends.    Wait  at least a half-hour after smoking, eating, or exercising. Don't drink coffee, tea, soda, or other caffeinated beverages before checking your blood pressure.    Sit comfortably at a table with both feet on the floor. Do not cross your legs or feet. Place the monitor near you.    Rest for a few minutes before you begin.  Step 2. Wrap the cuff      Place your arm on the table, palm up. Your arm should be at the level of your heart. Wrap the cuff around your upper arm, just above your elbow. It s best done on bare skin, not over clothing. Most cuffs will indicate where the brachial artery (the blood vessel in the middle of the arm at the inner side of the elbow) should line up with the cuff. Look in your monitor's instruction booklet for an illustration. You can also bring your cuff to your healthcare provider and have them show you how to correctly place the cuff.  Step 3. Inflate the cuff      Push the button that starts the pump.    The cuff will tighten, then loosen.    The numbers will change. When they stop changing, your blood pressure reading will appear.    Take 2 or 3 readings one minute apart.  Step 4. Write down the results of each reading      Write down your blood pressure numbers for each reading. Note the date and time. Keep your results in one place, such as a notebook. Even if your monitor has a built-in memory, keep a hard copy of the readings.    Remove the cuff from your arm. Turn off the machine.    Bring your blood pressure records with your healthcare providers at each visit.    If you start a new blood pressure medicine, note the day you started the new medicine. Also note the day if you change the dose of your medicine. This information goes on your blood pressure recording sheet. This will help your healthcare provider monitor how well the medicine changes are working.    Ask your healthcare provider what numbers should prompt you to call him or her. Also ask what numbers should prompt you to  get help right away.  Date Last Reviewed: 11/1/2016 2000-2017 [employer]. [address] [place] [address], [place] [address]. All rights reserved. This information is not intended as a substitute for professional medical care. Always follow your healthcare professional's instructions.          Future Appointments        Provider Department Dept Phone Center    3/13/2018 10:30 AM [facility] [phone] [facility]      24 Hour Appointment Hotline       To make an appointment at any [facility], call [phone] ([phone]). If you don't have a family doctor or clinic, we will help you find one. [facility] are conveniently located to serve the needs of you and your family.             Review of your medicines      START taking        Dose / Directions Last dose taken    lisinopril 5 MG tablet   Commonly known as:  PRINIVIL/ZESTRIL   Dose:  5 mg   Quantity:  3 tablet        Take 1 tablet (5 mg) by mouth daily   Refills:  0          Our records show that you are taking the medicines listed below. If these are incorrect, please call your family doctor or clinic.        Dose / Directions Last dose taken    meloxicam 15 MG tablet   Commonly known as:  MOBIC   Dose:  15 mg   Quantity:  90 tablet        Take 1 tablet (15 mg) by mouth daily   Refills:  3        * metoprolol tartrate 25 MG tablet   Commonly known as:  LOPRESSOR   Dose:  25 mg   Quantity:  90 tablet        Take 1 tablet (25 mg) by mouth daily   Refills:  3        * metoprolol tartrate 50 MG tablet   Commonly known as:  LOPRESSOR   Quantity:  60 tablet        Take 1 tab PO every 2 hours prn racing heart   Refills:  1        * Notice:  This list has 2 medication(s) that are the same as other medications prescribed for you. Read the directions carefully, and ask your doctor or other care provider to review them with you.            Prescriptions were sent or printed at these locations (1  "Prescription)                   Other Prescriptions                Printed at Department/Unit printer (1 of 1)         lisinopril (PRINIVIL/ZESTRIL) 5 MG tablet                Procedures and tests performed during your visit     *UA reflex to Microscopic    Basic metabolic panel    CBC with platelets differential    EKG 12-lead, tracing only    Troponin I      Orders Needing Specimen Collection     None      Pending Results     Date and Time Order Name Status Description    3/8/2018 1132 EKG 12-lead, tracing only In process             Pending Culture Results     No orders found from 3/6/2018 to 3/9/2018.            Thank you for choosing [facility]       Thank you for choosing [facility] for your care. Our goal is always to provide you with excellent care. Hearing back from our patients is one way we can continue to improve our services. Please take a few minutes to complete the written survey that you may receive in the mail after you visit with us. Thank you!        [facility] Information     [employer] lets you send messages to your doctor, view your test results, renew your prescriptions, schedule appointments and more. To sign up, go to [email or web address] . Click on \"Log in\" on the left side of the screen, which will take you to the Welcome page. Then click on \"Sign up Now\" on the right side of the page.     You will be asked to enter the access code listed below, as well as some personal information. Please follow the directions to create your username and password.     Your access code is: [email or web address]  Expires: 2018  9:24 AM     Your access code will  in 90 days. If you need help or a new code, please call your [facility] clinic or [phone].        Care EveryWhere ID     This is your Care EveryWhere ID. This could be used by other organizations to access your [facility] medical records  [id number]        Equal Access to Services     JERAD CARLSON AH: carlos Hoskins, " dionne espinoza ah. So [facility] [phone].    ATENCIÓN: Si habla brittañol, tiene a golden disposición servicios gratuitos de asistencia lingüística. Llame al [phone].    We comply with applicable federal civil rights laws and [place] laws. We do not discriminate on the basis of race, color, national origin, age, disability, sex, sexual orientation, or gender identity.            After Visit Summary       This is your record. Keep this with you and show to your community pharmacist(s) and doctor(s) at your next visit.

## 2018-03-08 NOTE — DISCHARGE INSTRUCTIONS
Taking Your Blood Pressure  Blood pressure is the force of blood against the artery wall as it moves from the heart through the blood vessels. You can take your own blood pressure reading using a digital monitor. Take your readings the same each time, using the same arm. Take readings as often as your healthcare provider instructs.  About blood pressure monitors  Blood pressure monitors are designed for certain ages and cases. You can find monitors for older adults, for pregnant women, and for children. Make sure the one you choose is the right one for your age and situation.  The American Heart Association recommends an automatic cuff monitor that fits on your upper arm (bicep). The cuff should fit your arm size. A cuff that s too large or too small will not give an accurate reading. Measure around your upper arm to find your size.  Monitors that attach to your finger or wrist are not as accurate as monitors for your upper arm.  Ask your healthcare provider for help in choosing a monitor. Bring your monitor to your next provider visit if you need help in using it the correct way.  The steps below are general instructions for using an automatic digital monitor.  Step 1. Relax      Take your blood pressure at the same time every day, such as in the morning or evening, or at the time your healthcare provider recommends.    Wait at least a half-hour after smoking, eating, or exercising. Don't drink coffee, tea, soda, or other caffeinated beverages before checking your blood pressure.    Sit comfortably at a table with both feet on the floor. Do not cross your legs or feet. Place the monitor near you.    Rest for a few minutes before you begin.  Step 2. Wrap the cuff      Place your arm on the table, palm up. Your arm should be at the level of your heart. Wrap the cuff around your upper arm, just above your elbow. It s best done on bare skin, not over clothing. Most cuffs will indicate where the brachial artery (the  blood vessel in the middle of the arm at the inner side of the elbow) should line up with the cuff. Look in your monitor's instruction booklet for an illustration. You can also bring your cuff to your healthcare provider and have them show you how to correctly place the cuff.  Step 3. Inflate the cuff      Push the button that starts the pump.    The cuff will tighten, then loosen.    The numbers will change. When they stop changing, your blood pressure reading will appear.    Take 2 or 3 readings one minute apart.  Step 4. Write down the results of each reading      Write down your blood pressure numbers for each reading. Note the date and time. Keep your results in one place, such as a notebook. Even if your monitor has a built-in memory, keep a hard copy of the readings.    Remove the cuff from your arm. Turn off the machine.    Bring your blood pressure records with your healthcare providers at each visit.    If you start a new blood pressure medicine, note the day you started the new medicine. Also note the day if you change the dose of your medicine. This information goes on your blood pressure recording sheet. This will help your healthcare provider monitor how well the medicine changes are working.    Ask your healthcare provider what numbers should prompt you to call him or her. Also ask what numbers should prompt you to get help right away.  Date Last Reviewed: 11/1/2016 2000-2017 [employer]. [address] [place] [address], [place] [address]. All rights reserved. This information is not intended as a substitute for professional medical care. Always follow your healthcare professional's instructions.

## 2018-03-09 ENCOUNTER — TELEPHONE (OUTPATIENT)
Dept: SURGERY | Facility: OTHER | Age: 53
End: 2018-03-09

## 2018-03-09 NOTE — TELEPHONE ENCOUNTER
Patient was seen in Er for elevated systolic BP after bringing forward to anesthesia, a message was left for Isabel saying she needed to see primairy and get BP resolved before we go forward with scope and that if she needed assistance getting an appointment I would assist her

## 2018-03-09 NOTE — TELEPHONE ENCOUNTER
She should see IM as has a fib and HTN and no primary listed and re-schedule colonoscopy for diarrhea

## 2018-03-09 NOTE — TELEPHONE ENCOUNTER
Upcoming appointment with Dr. Dawn.  Colonoscopy rescheduled.  Holly Crews LPN..........3/9/2018  2:12 PM

## 2018-03-12 ENCOUNTER — OFFICE VISIT (OUTPATIENT)
Dept: FAMILY MEDICINE | Facility: OTHER | Age: 53
End: 2018-03-12
Attending: PHYSICIAN ASSISTANT
Payer: COMMERCIAL

## 2018-03-12 VITALS
DIASTOLIC BLOOD PRESSURE: 94 MMHG | WEIGHT: 138.2 LBS | BODY MASS INDEX: 23 KG/M2 | TEMPERATURE: 98.1 F | SYSTOLIC BLOOD PRESSURE: 162 MMHG | HEART RATE: 52 BPM

## 2018-03-12 DIAGNOSIS — I48.0 PAROXYSMAL ATRIAL FIBRILLATION (H): Primary | ICD-10-CM

## 2018-03-12 PROCEDURE — G0463 HOSPITAL OUTPT CLINIC VISIT: HCPCS

## 2018-03-12 PROCEDURE — 99213 OFFICE O/P EST LOW 20 MIN: CPT | Performed by: PHYSICIAN ASSISTANT

## 2018-03-12 RX ORDER — LISINOPRIL 5 MG/1
5 TABLET ORAL DAILY
Qty: 30 TABLET | Refills: 0 | Status: SHIPPED | OUTPATIENT
Start: 2018-03-12 | End: 2018-03-13

## 2018-03-12 NOTE — PATIENT INSTRUCTIONS
Continue lisinopril 5 mg daily.     Blood pressure is mildly elevated today. Encouraged to monitor blood pressure a couple times a week over the next few weeks.     Referred to internal medicine.

## 2018-03-12 NOTE — PROGRESS NOTES
Nursing Notes:   Adela Merchant LPN  3/12/2018 10:00 AM  Signed  Patient presents to the clinic for blood pressure recheck and management.  MIMA Bronson........................3/12/2018  9:47 AM      HPI:    Isabel Talbot is a 52 year old female who presents for blood pressure recheck and management.  Patient presented to the ER on 3/8/2018 with elevated blood pressures.  Patient took an extra metoprolol to calm down her pulse.  No chest pain, headache, shortness of breath, recent illness, urinary symptoms in the emergency room.  Patient has history of atrial fibrillation.  EKG in the emergency room showed sinus bradycardia with no concerning ST or T changes.  Patient's hemoglobin was minimally decreased at 10.8 in the emergency room.  Otherwise unremarkable CBC, BMP, troponin, urinalysis on 3/8/2018.  175/99 on the morning of 3/8.  Since then with the addition of lisinopril 5 mg blood pressures have been ranging from systolic 150-160 and diastolic 80s-90s.  Patient is feeling fine.  No chest pain, palpitations, problems breathing, headaches, jaw pain, arm pain.  No racing heart feelings.  Has not had to take another dose of her metoprolol.  They discontinued her colonoscopy at this time until her blood pressure is under better control and she can establish care with internal medicine.  Not lightheaded or dizzy.  No cough or cold symptoms.    History reviewed. No pertinent past medical history.    Past Surgical History:   Procedure Laterality Date     HAND SURGERY      s/p fall       Family History   Problem Relation Age of Onset     CANCER Mother      throat, tonsillar cancer     HEART DISEASE Father      Hypertension Father      Hyperlipidemia Father        Social History     Social History     Marital status: Single     Spouse name: N/A     Number of children: N/A     Years of education: N/A     Occupational History     Not on file.     Social History Main Topics     Smoking status: Current Every Day  Smoker     Packs/day: 0.50     Years: 38.00     Types: Cigarettes     Smokeless tobacco: Never Used     Alcohol use Yes      Comment: occasional.     Drug use: No     Sexual activity: Not Currently     Other Topics Concern     Not on file     Social History Narrative       Current Outpatient Prescriptions   Medication Sig Dispense Refill     lisinopril (PRINIVIL/ZESTRIL) 5 MG tablet Take 1 tablet (5 mg) by mouth daily 30 tablet 0     [DISCONTINUED] lisinopril (PRINIVIL/ZESTRIL) 5 MG tablet Take 1 tablet (5 mg) by mouth daily 3 tablet 0     metoprolol tartrate (LOPRESSOR) 25 MG tablet Take 1 tablet (25 mg) by mouth daily 90 tablet 3     meloxicam (MOBIC) 15 MG tablet Take 1 tablet (15 mg) by mouth daily 90 tablet 3     metoprolol tartrate (LOPRESSOR) 50 MG tablet Take 1 tab PO every 2 hours prn racing heart 60 tablet 1       No Known Allergies    REVIEW OF SYSTEMS:  Refer to HPI.    EXAM:   Vitals:    BP (!) 162/94 (BP Location: Right arm, Patient Position: Sitting, Cuff Size: Adult Regular)  Pulse 52  Temp 98.1  F (36.7  C) (Tympanic)  Wt 138 lb 3.2 oz (62.7 kg)  Breastfeeding? No  BMI 23 kg/m2    General Appearance: Pleasant, alert, appropriate appearance for age. No acute distress  Chest/Respiratory Exam: Normal chest wall and respirations. Clear to auscultation.  Cardiovascular Exam: Regular rate and rhythm. S1, S2, no murmur, click, gallop, or rubs.  Skin: no rash or abnormalities  Psychiatric Exam: Alert and oriented - appropriate affect.    PHQ Depression Screen  No flowsheet data found.    ASSESSMENT AND PLAN:    1. Paroxysmal atrial fibrillation (H)        Continue lisinopril 5 mg daily.     Blood pressure is mildly elevated today. Encouraged to monitor blood pressure a couple times a week over the next few weeks.     Referred to internal medicine to establish care and discuss blood pressure.  Patient needs to have her history of atrial fibrillation and blood pressure under good control prior to having  a colonoscopy rescheduled.    Gave warning signs and symptoms.  Monitor for chest pain, palpitations, problems breathing, racing heart, arm pain, jaw pain, vision or hearing concerns..    Patient Instructions   Continue lisinopril 5 mg daily.     Blood pressure is mildly elevated today. Encouraged to monitor blood pressure a couple times a week over the next few weeks.     Referred to internal medicine.       Leela Carrion..................3/12/2018 9:52 AM

## 2018-03-12 NOTE — MR AVS SNAPSHOT
After Visit Summary   3/12/2018    Isabel Talbot    MRN: [id number]           Patient Information     Date Of Birth          1965        Visit Information        Provider Department      3/12/2018 9:45 AM Leela Carrion PA-C [facility]        Today's Diagnoses     Paroxysmal atrial fibrillation (H)    -  1      Care Instructions    Continue lisinopril 5 mg daily.     Blood pressure is mildly elevated today. Encouraged to monitor blood pressure a couple times a week over the next few weeks.     Referred to internal medicine.           Follow-ups after your visit        Additional Services     INTERNAL MEDICINE REFERRAL       Atrial fibrillation, establish care    Your provider has referred you to: [facility]: [facility] - [place], [place] [phone]  [email or web address]    Please be aware that coverage of these services is subject to the terms and limitations of your health insurance plan.  Call member services at your health plan with any benefit or coverage questions.      Please bring the following to your appointment:  >>   Any x-rays, CTs or MRIs which have been performed.  Contact the facility where they were done to arrange for  prior to your scheduled appointment.   >>   List of current medications   >>   This referral request   >>   Any documents/labs given to you for this referral                  Follow-up notes from your care team     Return if symptoms worsen or fail to improve.      Your next 10 appointments already scheduled     Mar 13, 2018 10:30 AM CDT   (Arrive by 10:15 AM)   MA SCREENING DIGITAL BILATERAL with GHMA1   [facility]    [address]  [facility] [id number] [phone]           Do not use any powder, lotion or deodorant under your arms or on your breast. If you do, we will ask you to remove it before your exam.  Wear comfortable,  "two-piece clothing.  If you have any allergies, tell your care team.  Bring any previous mammograms from other facilities or have them mailed to the breast center.            Mar 20, 2018  9:40 AM CDT   CONSULT with Bakari Guillen MD   [facility] ([facility]    [address]  [place] [address]   [phone]              Future tests that were ordered for you today     Open Future Orders        Priority Expected Expires Ordered    MA Screening Digital Bilateral Routine  3/12/2019 3/1/2018            Who to contact     If you have questions or need follow up information about today's clinic visit or your schedule please contact [facility] directly at [phone].  Normal or non-critical lab and imaging results will be communicated to you by [employer], letter or phone within 4 business days after the clinic has received the results. If you do not hear from us within 7 days, please contact the clinic through [employer] or phone. If you have a critical or abnormal lab result, we will notify you by phone as soon as possible.  Submit refill requests through [employer] or call your pharmacy and they will forward the refill request to us. Please allow 3 business days for your refill to be completed.          Additional Information About Your Visit        [employer] Information     [employer] lets you send messages to your doctor, view your test results, renew your prescriptions, schedule appointments and more. To sign up, go to [email or web address] . Click on \"Log in\" on the left side of the screen, which will take you to the Welcome page. Then click on \"Sign up Now\" on the right side of the page.     You will be asked to enter the access code listed below, as well as some personal information. Please follow the directions to create your username and password.     Your access code is: [email or web address]  Expires: 5/30/2018 10:24 AM     Your access code will "  in 90 days. If you need help or a new code, please call your [facility] or [phone].        Care EveryWhere ID     This is your Care EveryWhere ID. This could be used by other organizations to access your [facility] medical records  [id number]        Your Vitals Were     Pulse Temperature Breastfeeding? BMI (Body Mass Index)          52 98.1  F (36.7  C) (Tympanic) No 23 kg/m2         Blood Pressure from Last 3 Encounters:   18 (!) 162/94   18 145/80   18 138/84    Weight from Last 3 Encounters:   18 62.7 kg (138 lb 3.2 oz)   18 59.4 kg (131 lb)   18 59.7 kg (131 lb 9.6 oz)              We Performed the Following     INTERNAL MEDICINE REFERRAL          Where to get your medicines      These medications were sent to [facility] [address] [place] [address]     Phone:  [phone]     lisinopril 5 MG tablet          Primary Care Provider Fax #    Physician No Ref-Primary [phone]       No address on file        Equal Access to Services     JERAD CARLSON : Hadii joce cabrerao Soerika, waaxda luqadaha, qaybta kaalmada adeonealyada, dionne avina. So [facility] [phone].    ATENCIÓN: Si habla español, tiene a golden disposición servicios gratuitos de asistencia lingüística. Natividadame al [phone].    We comply with applicable federal civil rights laws and [place] laws. We do not discriminate on the basis of race, color, national origin, age, disability, sex, sexual orientation, or gender identity.            Thank you!     Thank you for choosing [facility]  for your care. Our goal is always to provide you with excellent care. Hearing back from our patients is one way we can continue to improve our services. Please take a few minutes to complete the written survey that you may receive in the mail after your visit with us. Thank you!             Your  Updated Medication List - Protect others around you: Learn how to safely use, store and throw away your medicines at www.disposemymeds.org.          This list is accurate as of 3/12/18 10:14 AM.  Always use your most recent med list.                   Brand Name Dispense Instructions for use Diagnosis    lisinopril 5 MG tablet    PRINIVIL/ZESTRIL    30 tablet    Take 1 tablet (5 mg) by mouth daily    Paroxysmal atrial fibrillation (H)       meloxicam 15 MG tablet    MOBIC    90 tablet    Take 1 tablet (15 mg) by mouth daily    Rheumatoid arthritis, involving unspecified site, unspecified rheumatoid factor presence (H)       * metoprolol tartrate 25 MG tablet    LOPRESSOR    90 tablet    Take 1 tablet (25 mg) by mouth daily    Paroxysmal atrial fibrillation (H)       * metoprolol tartrate 50 MG tablet    LOPRESSOR    60 tablet    Take 1 tab PO every 2 hours prn racing heart    Paroxysmal atrial fibrillation (H)       * Notice:  This list has 2 medication(s) that are the same as other medications prescribed for you. Read the directions carefully, and ask your doctor or other care provider to review them with you.

## 2018-03-12 NOTE — NURSING NOTE
Patient presents to the clinic for blood pressure recheck and management.  MIMA Bronson........................3/12/2018  9:47 AM

## 2018-03-13 ENCOUNTER — OFFICE VISIT (OUTPATIENT)
Dept: INTERNAL MEDICINE | Facility: OTHER | Age: 53
End: 2018-03-13
Attending: INTERNAL MEDICINE
Payer: COMMERCIAL

## 2018-03-13 ENCOUNTER — HOSPITAL ENCOUNTER (OUTPATIENT)
Dept: MAMMOGRAPHY | Facility: OTHER | Age: 53
Discharge: HOME | End: 2018-03-13
Attending: PHYSICIAN ASSISTANT | Admitting: PHYSICIAN ASSISTANT
Payer: COMMERCIAL

## 2018-03-13 VITALS
DIASTOLIC BLOOD PRESSURE: 74 MMHG | HEART RATE: 56 BPM | SYSTOLIC BLOOD PRESSURE: 130 MMHG | BODY MASS INDEX: 22.63 KG/M2 | WEIGHT: 136 LBS

## 2018-03-13 DIAGNOSIS — D64.9 ANEMIA, UNSPECIFIED TYPE: ICD-10-CM

## 2018-03-13 DIAGNOSIS — I10 BENIGN ESSENTIAL HYPERTENSION: ICD-10-CM

## 2018-03-13 DIAGNOSIS — Z12.31 VISIT FOR SCREENING MAMMOGRAM: ICD-10-CM

## 2018-03-13 DIAGNOSIS — I48.0 PAROXYSMAL ATRIAL FIBRILLATION (H): ICD-10-CM

## 2018-03-13 DIAGNOSIS — Z12.31 ENCOUNTER FOR SCREENING MAMMOGRAM FOR BREAST CANCER: ICD-10-CM

## 2018-03-13 DIAGNOSIS — Z71.6 ENCOUNTER FOR TOBACCO USE CESSATION COUNSELING: ICD-10-CM

## 2018-03-13 DIAGNOSIS — E55.9 HYPOVITAMINOSIS D: ICD-10-CM

## 2018-03-13 DIAGNOSIS — M06.9 RHEUMATOID ARTHRITIS, INVOLVING UNSPECIFIED SITE, UNSPECIFIED RHEUMATOID FACTOR PRESENCE: Primary | ICD-10-CM

## 2018-03-13 PROCEDURE — 77067 SCR MAMMO BI INCL CAD: CPT

## 2018-03-13 PROCEDURE — G0463 HOSPITAL OUTPT CLINIC VISIT: HCPCS

## 2018-03-13 PROCEDURE — 99215 OFFICE O/P EST HI 40 MIN: CPT | Performed by: INTERNAL MEDICINE

## 2018-03-13 RX ORDER — LISINOPRIL 5 MG/1
5 TABLET ORAL DAILY
Qty: 90 TABLET | Refills: 3 | Status: ON HOLD | OUTPATIENT
Start: 2018-03-13 | End: 2018-06-28

## 2018-03-13 RX ORDER — CHOLECALCIFEROL (VITAMIN D3) 50 MCG
2000 TABLET ORAL DAILY
Qty: 100 TABLET | Refills: 3 | Status: ON HOLD | COMMUNITY
Start: 2018-03-13 | End: 2018-06-28

## 2018-03-13 ASSESSMENT — ANXIETY QUESTIONNAIRES
6. BECOMING EASILY ANNOYED OR IRRITABLE: NOT AT ALL
5. BEING SO RESTLESS THAT IT IS HARD TO SIT STILL: NOT AT ALL
7. FEELING AFRAID AS IF SOMETHING AWFUL MIGHT HAPPEN: NOT AT ALL
3. WORRYING TOO MUCH ABOUT DIFFERENT THINGS: NOT AT ALL
GAD7 TOTAL SCORE: 0
2. NOT BEING ABLE TO STOP OR CONTROL WORRYING: NOT AT ALL
IF YOU CHECKED OFF ANY PROBLEMS ON THIS QUESTIONNAIRE, HOW DIFFICULT HAVE THESE PROBLEMS MADE IT FOR YOU TO DO YOUR WORK, TAKE CARE OF THINGS AT HOME, OR GET ALONG WITH OTHER PEOPLE: NOT DIFFICULT AT ALL
1. FEELING NERVOUS, ANXIOUS, OR ON EDGE: NOT AT ALL

## 2018-03-13 ASSESSMENT — PAIN SCALES - GENERAL: PAINLEVEL: MILD PAIN (2)

## 2018-03-13 ASSESSMENT — PATIENT HEALTH QUESTIONNAIRE - PHQ9: 5. POOR APPETITE OR OVEREATING: NOT AT ALL

## 2018-03-13 NOTE — MR AVS SNAPSHOT
After Visit Summary   3/13/2018    Isabel Talbot    MRN: [id number]           Patient Information     Date Of Birth          1965        Visit Information        Provider Department      3/13/2018 9:40 AM Bakari Guillen MD [facility]        Today's Diagnoses     Rheumatoid arthritis, involving unspecified site, unspecified rheumatoid factor presence (H)    -  1    Paroxysmal atrial fibrillation (H)        Hypovitaminosis D        Anemia, unspecified type        Benign essential hypertension        Encounter for tobacco use cessation counseling          Care Instructions    1. Rheumatoid arthritis, involving unspecified site, unspecified rheumatoid factor presence (H)    2. Paroxysmal atrial fibrillation (H)  - CARDIOLOGY EVAL ADULT REFERRAL    3. Hypovitaminosis D  - Cholecalciferol (VITAMIN D) 2000 UNITS tablet; Take 2,000 Units by mouth daily  Dispense: 100 tablet; Refill: 3    4. Anemia, unspecified type  - Vitamin B12; Future  - Folate; Future  - Ferritin; Future  - Thyrotropin GH; Future    5. Benign essential hypertension  - lisinopril (PRINIVIL/ZESTRIL) 5 MG tablet; Take 1 tablet (5 mg) by mouth daily  Dispense: 90 tablet; Refill: 3    6. Encounter for tobacco use cessation counseling    QUIT SMOKING!!     -- Choose a quit date (within 1 month). Quitting smoking abruptly is more successful than gradually cutting back.   -- Tell everyone about it (friends, family, coworkers)   -- Think about when yousmoke the most, and what you'll do during those times (eg when in the car, work breaks, etc)     Consider:   -- Start varenicline (Chantix) 1 week before your quit date   -- Start bupropion (Wellbutrin/Zyban) 1 week before your quit date -- Welbutrin 1 pill daily for 1 week then 1 pill twice daily      -- Stop smoking onquit date   -- Starting with quit date, use nicotine lozenges/gum as needed for cravings     -- Quit Plan - Call them in the next 1-2 weeks to help you  quitsmoking.                        [phone] ([phone])                        http://www.quitplan.com   -- http://smokefree.gov/       Return as needed for follow-up with Dr. Guillen.    Clinic : [phone]  Appointment line: [phone]            Follow-ups after your visit        Additional Services     CARDIOLOGY EVAL ADULT REFERRAL       Preferred location:  [facility] [phone] [email or web address]    Please be aware that coverage of these services is subject to the terms and limitations of your health insurance plan.  Call member services at your health plan with any benefit or coverage questions.      Please bring the following to your appointment:  Any x-rays, CTs or MRIs which have been performed. Contact the facility where they were done to arrange for  prior to your scheduled appointment.    List of current medications  This referral request   Any documents/labs given to you for this referral                  Follow-up notes from your care team     Return if symptoms worsen or fail to improve.      Your next 10 appointments already scheduled     Mar 15, 2018 11:00 AM CDT   LAB with GH LAB   [facility] ([facility])    [address] [facility] [address] [phone]           Please do not eat 10-12 hours before your appointment if you are coming in fasting for labs on lipids, cholesterol, or glucose (sugar). This does not apply to pregnant women. Water, hot tea and black coffee (with nothing added) are okay. Do not drink other fluids, diet soda or chew gum.            Mar 29, 2018 10:45 AM CDT   New Visit with Dallas Calix,    [facility] ([facility])    [address]  [place] [id number] [phone]              Future tests that were ordered for you today     Open Future Orders  "       Priority Expected Expires Ordered    Vitamin B12 Routine 3/15/2018 2018 3/13/2018    Folate Routine 3/15/2018 2018 3/13/2018    Ferritin Routine 3/15/2018 2018 3/13/2018    Thyrotropin GH Routine 3/15/2018 2018 3/13/2018    MA Screening Digital Bilateral Routine  3/12/2019 3/1/2018            Who to contact     If you have questions or need follow up information about today's clinic visit or your schedule please contact [facility] directly at [phone].  Normal or non-critical lab and imaging results will be communicated to you by [employer], letter or phone within 4 business days after the clinic has received the results. If you do not hear from us within 7 days, please contact the clinic through [employer] or phone. If you have a critical or abnormal lab result, we will notify you by phone as soon as possible.  Submit refill requests through [employer] or call your pharmacy and they will forward the refill request to us. Please allow 3 business days for your refill to be completed.          Additional Information About Your Visit        [employer] Information     [employer] lets you send messages to your doctor, view your test results, renew your prescriptions, schedule appointments and more. To sign up, go to [email or web address] . Click on \"Log in\" on the left side of the screen, which will take you to the Welcome page. Then click on \"Sign up Now\" on the right side of the page.     You will be asked to enter the access code listed below, as well as some personal information. Please follow the directions to create your username and password.     Your access code is: [email or web address]  Expires: 2018 10:24 AM     Your access code will  in 90 days. If you need help or a new code, please call your [facility] or [phone].        Care EveryWhere ID     This is your Care EveryWhere ID. This could be used by other organizations to access your [facility] medical " records  [id number]        Your Vitals Were     Pulse BMI (Body Mass Index)                56 22.63 kg/m2           Blood Pressure from Last 3 Encounters:   03/13/18 130/74   03/12/18 (!) 162/94   03/08/18 145/80    Weight from Last 3 Encounters:   03/13/18 61.7 kg (136 lb)   03/12/18 62.7 kg (138 lb 3.2 oz)   03/08/18 59.4 kg (131 lb)              We Performed the Following     CARDIOLOGY EVAL ADULT REFERRAL          Where to get your medicines      These medications were sent to [facility] [address] [address]     Phone:  [phone]     lisinopril 5 MG tablet          Primary Care Provider Fax #    Physician No Ref-Primary [phone]       No address on file        Equal Access to Services     [facility] : Hadclaire Donaldson, waronnie bhandari, reanna kaalmatravis dickens, dionne sabillon . So [facility] [phone].    ATENCIÓN: Si habla español, tiene a golden disposición servicios gratuitos de asistencia lingüística. Llame al [phone].    We comply with applicable federal civil rights laws and [place] laws. We do not discriminate on the basis of race, color, national origin, age, disability, sex, sexual orientation, or gender identity.            Thank you!     Thank you for choosing [facility]  for your care. Our goal is always to provide you with excellent care. Hearing back from our patients is one way we can continue to improve our services. Please take a few minutes to complete the written survey that you may receive in the mail after your visit with us. Thank you!             Your Updated Medication List - Protect others around you: Learn how to safely use, store and throw away your medicines at www.disposemymeds.org.          This list is accurate as of 3/13/18 10:54 AM.  Always use your most recent med list.                   Brand Name Dispense Instructions for use  Diagnosis    lisinopril 5 MG tablet    PRINIVIL/ZESTRIL    90 tablet    Take 1 tablet (5 mg) by mouth daily    Benign essential hypertension       meloxicam 15 MG tablet    MOBIC    90 tablet    Take 1 tablet (15 mg) by mouth daily    Rheumatoid arthritis, involving unspecified site, unspecified rheumatoid factor presence (H)       * metoprolol tartrate 25 MG tablet    LOPRESSOR    90 tablet    Take 1 tablet (25 mg) by mouth daily    Paroxysmal atrial fibrillation (H)       * metoprolol tartrate 50 MG tablet    LOPRESSOR    60 tablet    Take 1 tab PO every 2 hours prn racing heart    Paroxysmal atrial fibrillation (H)       vitamin D 2000 UNITS tablet     100 tablet    Take 2,000 Units by mouth daily    Hypovitaminosis D       * Notice:  This list has 2 medication(s) that are the same as other medications prescribed for you. Read the directions carefully, and ask your doctor or other care provider to review them with you.

## 2018-03-13 NOTE — PROGRESS NOTES
Nursing Notes:   Mallory Santana LPN  3/13/2018 10:11 AM  Signed  Patient presents to the clinic for establish care.        Mallory Santana LPN 3/13/2018 9:54 AM      Nursing note reviewed with patient.  Accurracy and completeness verified.   Ms. Talbot is a 52 year old female who:  Patient presents with:  Establish Care    HPI     ICD-10-CM    1. Rheumatoid arthritis, involving unspecified site, unspecified rheumatoid factor presence (H) M06.9    2. Paroxysmal atrial fibrillation (H) I48.0 CARDIOLOGY EVAL ADULT REFERRAL   3. Hypovitaminosis D E55.9 Cholecalciferol (VITAMIN D) 2000 UNITS tablet   4. Anemia, unspecified type D64.9 Vitamin B12     Folate     Ferritin     Thyrotropin GH   5. Benign essential hypertension I10 lisinopril (PRINIVIL/ZESTRIL) 5 MG tablet   6. Encounter for tobacco use cessation counseling Z71.6      Patient presents to establish care.  Recently moved up to [place] from [place] in November 2017.  She has an appointment to see rheumatology on 4/28.  States that her joints are quite sore.  She has been using meloxicam/Mobic for pain.    Paroxysmal atrial fibrillation, states that she has had this for 5 or 6 years.  She is not currently on any blood thinners.  We did discuss the possibility of starting blood thinners today but she would like to speak with cardiology.    Old records need to be obtained.  Check thyroid levels today.  She states her last echocardiogram was not all that long ago and was okay.  I do not have these records.    Cardiology referral sent.    Anemia, etiology unknown.  Check B12, folate, iron levels, thyroid.    Hypertension, improved.  Just started lisinopril 5 mg daily.  She still takes metoprolol.  Evidently she was supposed to have a colonoscopy a couple days ago but this was canceled because she was in the emergency room and her blood pressure was high.    With her upcoming colonoscopy, would need to hold anticoagulation anyway.  Advised to have this started after  her colonoscopy is completed.    Tobacco abuse, ongoing.  We did discuss smoking cessation options.  She is is working on quitting on her own at this time.    Functional Capacity: > or about 4 METS.   Reports that she can climb a flight of stairs without any chest pain/heaviness or shortness of breath.   No orthopnea/paroxysmal nocturnal dyspnea  Review of Systems   Constitutional: Negative for chills, fatigue and fever.   HENT: Negative for congestion and hearing loss.    Eyes: Negative for pain and visual disturbance.   Respiratory: Negative for cough, shortness of breath and wheezing.    Cardiovascular: Positive for palpitations. Negative for chest pain.   Gastrointestinal: Negative for abdominal pain, diarrhea, nausea and vomiting.   Endocrine: Negative for cold intolerance and heat intolerance.   Genitourinary: Negative for dysuria and hematuria.   Musculoskeletal: Positive for arthralgias, back pain, gait problem and joint swelling. Negative for myalgias.   Skin: Negative for pallor.   Allergic/Immunologic: Negative for immunocompromised state.   Neurological: Negative for dizziness and light-headedness.   Hematological: Does not bruise/bleed easily.   Psychiatric/Behavioral: Negative for agitation and confusion.        NELLY:   NELLY-7 SCORE 3/13/2018   Total Score 0     PHQ9:  PHQ-9 SCORE 3/13/2018   Total Score 0       I have personally reviewed the past medical history, past surgical history, medications, allergies, family and social history as listed below, on 3/13/2018.    No Known Allergies    Current Outpatient Prescriptions   Medication Sig Dispense Refill     lisinopril (PRINIVIL/ZESTRIL) 5 MG tablet Take 1 tablet (5 mg) by mouth daily 90 tablet 3     Cholecalciferol (VITAMIN D) 2000 UNITS tablet Take 2,000 Units by mouth daily 100 tablet 3     metoprolol tartrate (LOPRESSOR) 25 MG tablet Take 1 tablet (25 mg) by mouth daily 90 tablet 3     meloxicam (MOBIC) 15 MG tablet Take 1 tablet (15 mg) by mouth  daily 90 tablet 3     metoprolol tartrate (LOPRESSOR) 50 MG tablet Take 1 tab PO every 2 hours prn racing heart 60 tablet 1        Patient Active Problem List    Diagnosis Date Noted     Anemia, unspecified type 03/13/2018     Priority: Medium     Benign essential hypertension 03/13/2018     Priority: Medium     Encounter for tobacco use cessation counseling 03/13/2018     Priority: Medium     Rheumatoid arthritis, involving unspecified site, unspecified rheumatoid factor presence (H) 03/01/2018     Priority: Medium     Paroxysmal atrial fibrillation (H) 03/01/2018     Priority: Medium     Hypovitaminosis D 03/01/2018     Priority: Medium     Diarrhea, unspecified type 03/01/2018     Priority: Medium     Incontinence of feces, unspecified fecal incontinence type 03/01/2018     Priority: Medium     Past Medical History:   Diagnosis Date     Benign essential hypertension 3/13/2018     Encounter for tobacco use cessation counseling 3/13/2018     Hypovitaminosis D 3/1/2018     Paroxysmal atrial fibrillation (H) 3/1/2018     Rheumatoid arthritis, involving unspecified site, unspecified rheumatoid factor presence (H) 3/1/2018     Past Surgical History:   Procedure Laterality Date     HAND SURGERY      s/p fall - age 19 or 20     Social History     Social History     Marital status: Single     Spouse name: N/A     Number of children: N/A     Years of education: N/A     Social History Main Topics     Smoking status: Current Every Day Smoker     Packs/day: 0.50     Years: 38.00     Types: Cigarettes     Smokeless tobacco: Never Used     Alcohol use Yes      Comment: occasional.     Drug use: No     Sexual activity: Not Currently     Other Topics Concern     None     Social History Narrative    Moved to [place] in November 2017 from [place].     Tobacco use.     .     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     Grew-up in [facility]. Was in [place] x13 years, after [place].      Family History   Problem  "Relation Age of Onset     CANCER Mother      throat, tonsillar cancer     HEART DISEASE Father      Hypertension Father      Hyperlipidemia Father        EXAM:   Vitals:    03/13/18 0956   BP: 130/74   BP Location: Right arm   Patient Position: Sitting   Cuff Size: Adult Regular   Pulse: 56   Weight: 136 lb (61.7 kg)       Current Pain Score: Mild Pain (2)     BP Readings from Last 3 Encounters:   03/13/18 130/74   03/12/18 (!) 162/94   03/08/18 145/80    Wt Readings from Last 3 Encounters:   03/13/18 136 lb (61.7 kg)   03/12/18 138 lb 3.2 oz (62.7 kg)   03/08/18 131 lb (59.4 kg)      Estimated body mass index is 22.63 kg/(m^2) as calculated from the following:    Height as of 3/8/18: 5' 5\" (1.651 m).    Weight as of this encounter: 136 lb (61.7 kg).     Physical Exam   Constitutional: She is oriented to person, place, and time. She appears well-developed and well-nourished. No distress.   HENT:   Head: Normocephalic and atraumatic.   Eyes: Conjunctivae are normal. No scleral icterus.   Neck: No thyromegaly present.   Cardiovascular: Normal rate and regular rhythm.    Pulmonary/Chest: Effort normal. No respiratory distress. She has no wheezes.   Abdominal: Soft. There is no tenderness.   Musculoskeletal: She exhibits tenderness and deformity.   Mild rheumatoid arthritis joints degenerative changes noted.   Lymphadenopathy:     She has no cervical adenopathy.   Neurological: She is alert and oriented to person, place, and time. No cranial nerve deficit.   Skin: Skin is warm and dry.   Psychiatric: She has a normal mood and affect.       INVESTIGATIONS:  Results for orders placed or performed during the hospital encounter of 03/08/18   CBC with platelets differential   Result Value Ref Range    WBC 5.2 4.0 - 11.0 10e9/L    RBC Count 3.50 (L) 3.8 - 5.2 10e12/L    Hemoglobin 10.8 (L) 11.7 - 15.7 g/dL    Hematocrit 31.7 (L) 35.0 - 47.0 %    MCV 91 78 - 100 fl    MCH 30.9 26.5 - 33.0 pg    MCHC 34.1 31.5 - 36.5 g/dL    " RDW 13.5 10.0 - 15.0 %    Platelet Count 200 150 - 450 10e9/L    Diff Method Automated Method     % Neutrophils 47.2 %    % Lymphocytes 39.8 %    % Monocytes 8.5 %    % Eosinophils 3.3 %    % Basophils 1.0 %    % Immature Granulocytes 0.2 %    Absolute Neutrophil 2.5 1.6 - 8.3 10e9/L    Absolute Lymphocytes 2.1 0.8 - 5.3 10e9/L    Absolute Monocytes 0.4 0.0 - 1.3 10e9/L    Absolute Eosinophils 0.2 0.0 - 0.7 10e9/L    Absolute Basophils 0.1 0.0 - 0.2 10e9/L    Abs Immature Granulocytes 0.0 0 - 0.4 10e9/L   Basic metabolic panel   Result Value Ref Range    Sodium 139 134 - 144 mmol/L    Potassium 4.0 3.5 - 5.1 mmol/L    Chloride 106 98 - 107 mmol/L    Carbon Dioxide 25 21 - 31 mmol/L    Anion Gap 8 3 - 14 mmol/L    Glucose 119 (H) 70 - 105 mg/dL    Urea Nitrogen 15 7 - 25 mg/dL    Creatinine 0.68 0.60 - 1.20 mg/dL    GFR Estimate >90 >60 mL/min/1.7m2    GFR Estimate If Black >90 >60 mL/min/1.7m2    Calcium 9.2 8.6 - 10.3 mg/dL   Troponin I   Result Value Ref Range    Troponin I ES <0.030 0.000 - 0.034 ug/L   *UA reflex to Microscopic   Result Value Ref Range    Color Urine Yellow     Appearance Urine Clear     Glucose Urine Negative NEG^Negative mg/dL    Bilirubin Urine Negative NEG^Negative    Ketones Urine Negative NEG^Negative mg/dL    Specific Gravity Urine 1.020 1.003 - 1.035    Blood Urine Negative NEG^Negative    pH Urine 6.0 5.0 - 7.0 pH    Protein Albumin Urine Negative NEG^Negative mg/dL    Urobilinogen Urine 0.2 0.2 - 1.0 EU/dL    Nitrite Urine Negative NEG^Negative    Leukocyte Esterase Urine Negative NEG^Negative    Source Midstream Urine    EKG 12-lead, tracing only    Narrative    EKG Interpretation:    Rhythm: Sinus bradycardia  Rate:  37  Axis: Normal  QRS interval: Normal  Conduction: Normal  ST Segments: Normal  QT interval: Normal  APC's Present: No  VPC's Present: No    Impression: Normal EKG other than significant decrease in rate.  No comparison available.    Violet Dawn, DO  Internal  Medicine         ASSESSMENT AND PLAN:  Problem List Items Addressed This Visit        Digestive    Hypovitaminosis D    Relevant Medications    Cholecalciferol (VITAMIN D) 2000 UNITS tablet       Circulatory    Paroxysmal atrial fibrillation (H)    Relevant Orders    CARDIOLOGY EVAL ADULT REFERRAL    Benign essential hypertension    Relevant Medications    lisinopril (PRINIVIL/ZESTRIL) 5 MG tablet       Musculoskeletal and Integumentary    Rheumatoid arthritis, involving unspecified site, unspecified rheumatoid factor presence (H) - Primary       Hematologic    Anemia, unspecified type    Relevant Orders    Vitamin B12    Folate    Ferritin    Thyrotropin GH       Other    Encounter for tobacco use cessation counseling        reviewed diet, exercise and weight control, very strongly urged to quit smoking to reduce cardiovascular risk, cardiovascular risk and specific lipid/LDL goals reviewed, use of aspirin to prevent MI and TIA's discussed  -- Expected clinical course discussed    -- Medications and their side effects discussed    40 minutes spent in face-to-face interaction with patient with greater than 50% spent in counseling and care coordination of listed medical problems.      The 10-year ASCVD risk score (Kryscaitlin RUIZ Jr, et al., 2013) is: 5.3%    Values used to calculate the score:      Age: 52 years      Sex: Female      Is Non- : No      Diabetic: No      Tobacco smoker: Yes      Systolic Blood Pressure: 130 mmHg      Is BP treated: Yes      HDL Cholesterol: 54 mg/dL      Total Cholesterol: 179 mg/dL    Patient Instructions   1. Rheumatoid arthritis, involving unspecified site, unspecified rheumatoid factor presence (H)    2. Paroxysmal atrial fibrillation (H)  - CARDIOLOGY EVAL ADULT REFERRAL    3. Hypovitaminosis D  - Cholecalciferol (VITAMIN D) 2000 UNITS tablet; Take 2,000 Units by mouth daily  Dispense: 100 tablet; Refill: 3    4. Anemia, unspecified type  - Vitamin B12; Future  -  Folate; Future  - Ferritin; Future  - Thyrotropin GH; Future    5. Benign essential hypertension  - lisinopril (PRINIVIL/ZESTRIL) 5 MG tablet; Take 1 tablet (5 mg) by mouth daily  Dispense: 90 tablet; Refill: 3    6. Encounter for tobacco use cessation counseling    QUIT SMOKING!!     -- Choose a quit date (within 1 month). Quitting smoking abruptly is more successful than gradually cutting back.   -- Tell everyone about it (friends, family, coworkers)   -- Think about when yousmoke the most, and what you'll do during those times (eg when in the car, work breaks, etc)     Consider:   -- Start varenicline (Chantix) 1 week before your quit date   -- Start bupropion (Wellbutrin/Zyban) 1 week before your quit date -- Welbutrin 1 pill daily for 1 week then 1 pill twice daily      -- Stop smoking onquit date   -- Starting with quit date, use nicotine lozenges/gum as needed for cravings     -- Quit Plan - Call them in the next 1-2 weeks to help you quitsmoking.                        [phone]                        http://www.quitplan.com   -- http://smokefree.gov/       Return as needed for follow-up with Dr. Guillen.    Clinic : [phone]  Appointment line: [phone]      Bakari Guillen MD  Internal Medicine  [facility]

## 2018-03-13 NOTE — NURSING NOTE
Patient presents to the clinic for establish care.        Mallory Santana LPN 3/13/2018 9:54 AM

## 2018-03-13 NOTE — PATIENT INSTRUCTIONS
1. Rheumatoid arthritis, involving unspecified site, unspecified rheumatoid factor presence (H)    2. Paroxysmal atrial fibrillation (H)  - CARDIOLOGY EVAL ADULT REFERRAL    3. Hypovitaminosis D  - Cholecalciferol (VITAMIN D) 2000 UNITS tablet; Take 2,000 Units by mouth daily  Dispense: 100 tablet; Refill: 3    4. Anemia, unspecified type  - Vitamin B12; Future  - Folate; Future  - Ferritin; Future  - Thyrotropin GH; Future    5. Benign essential hypertension  - lisinopril (PRINIVIL/ZESTRIL) 5 MG tablet; Take 1 tablet (5 mg) by mouth daily  Dispense: 90 tablet; Refill: 3    6. Encounter for tobacco use cessation counseling    QUIT SMOKING!!     -- Choose a quit date (within 1 month). Quitting smoking abruptly is more successful than gradually cutting back.   -- Tell everyone about it (friends, family, coworkers)   -- Think about when yousmoke the most, and what you'll do during those times (eg when in the car, work breaks, etc)     Consider:   -- Start varenicline (Chantix) 1 week before your quit date   -- Start bupropion (Wellbutrin/Zyban) 1 week before your quit date -- Welbutrin 1 pill daily for 1 week then 1 pill twice daily      -- Stop smoking onquit date   -- Starting with quit date, use nicotine lozenges/gum as needed for cravings     -- Quit Plan - Call them in the next 1-2 weeks to help you quitsmoking.                        [phone]                        http://www.quitplan.com   -- http://smokefree.gov/       Return as needed for follow-up with Dr. Guillen.    Clinic : [phone]  Appointment line: [phone]

## 2018-03-14 ASSESSMENT — PATIENT HEALTH QUESTIONNAIRE - PHQ9: SUM OF ALL RESPONSES TO PHQ QUESTIONS 1-9: 0

## 2018-03-14 ASSESSMENT — ANXIETY QUESTIONNAIRES: GAD7 TOTAL SCORE: 0

## 2018-03-15 ASSESSMENT — ENCOUNTER SYMPTOMS
ABDOMINAL PAIN: 0
PALPITATIONS: 1
NAUSEA: 0
CONFUSION: 0
JOINT SWELLING: 1
SHORTNESS OF BREATH: 0
CHILLS: 0
DIARRHEA: 0
MYALGIAS: 0
DIZZINESS: 0
LIGHT-HEADEDNESS: 0
HEMATURIA: 0
EYE PAIN: 0
COUGH: 0
BACK PAIN: 1
AGITATION: 0
ARTHRALGIAS: 1
FEVER: 0
BRUISES/BLEEDS EASILY: 0
VOMITING: 0
FATIGUE: 0
WHEEZING: 0
DYSURIA: 0

## 2018-04-02 ENCOUNTER — ANESTHESIA (OUTPATIENT)
Dept: SURGERY | Facility: OTHER | Age: 53
End: 2018-04-02
Payer: COMMERCIAL

## 2018-04-02 ENCOUNTER — ANESTHESIA EVENT (OUTPATIENT)
Dept: SURGERY | Facility: OTHER | Age: 53
End: 2018-04-02
Payer: COMMERCIAL

## 2018-04-02 ENCOUNTER — HOSPITAL ENCOUNTER (OUTPATIENT)
Facility: OTHER | Age: 53
Discharge: HOME | End: 2018-04-02
Attending: SURGERY | Admitting: SURGERY
Payer: COMMERCIAL

## 2018-04-02 ENCOUNTER — SURGERY (OUTPATIENT)
Age: 53
End: 2018-04-02

## 2018-04-02 VITALS
DIASTOLIC BLOOD PRESSURE: 90 MMHG | HEART RATE: 46 BPM | TEMPERATURE: 98.2 F | SYSTOLIC BLOOD PRESSURE: 154 MMHG | RESPIRATION RATE: 16 BRPM | OXYGEN SATURATION: 99 %

## 2018-04-02 PROCEDURE — 25000132 ZZH RX MED GY IP 250 OP 250 PS 637: Performed by: SURGERY

## 2018-04-02 PROCEDURE — 45380 COLONOSCOPY AND BIOPSY: CPT | Performed by: SURGERY

## 2018-04-02 PROCEDURE — 25000128 H RX IP 250 OP 636: Performed by: NURSE ANESTHETIST, CERTIFIED REGISTERED

## 2018-04-02 PROCEDURE — 25000125 ZZHC RX 250: Performed by: NURSE ANESTHETIST, CERTIFIED REGISTERED

## 2018-04-02 PROCEDURE — 88305 TISSUE EXAM BY PATHOLOGIST: CPT

## 2018-04-02 PROCEDURE — 45380 COLONOSCOPY AND BIOPSY: CPT

## 2018-04-02 PROCEDURE — 40000010 ZZH STATISTIC ANES STAT CODE-CRNA PER MINUTE: Performed by: SURGERY

## 2018-04-02 PROCEDURE — 27210995 ZZH RX 272: Performed by: SURGERY

## 2018-04-02 PROCEDURE — 25000128 H RX IP 250 OP 636: Performed by: SURGERY

## 2018-04-02 RX ORDER — SODIUM CHLORIDE, SODIUM LACTATE, POTASSIUM CHLORIDE, CALCIUM CHLORIDE 600; 310; 30; 20 MG/100ML; MG/100ML; MG/100ML; MG/100ML
INJECTION, SOLUTION INTRAVENOUS CONTINUOUS
Status: DISCONTINUED | OUTPATIENT
Start: 2018-04-02 | End: 2018-04-02 | Stop reason: HOSPADM

## 2018-04-02 RX ORDER — LIDOCAINE 40 MG/G
CREAM TOPICAL
Status: DISCONTINUED | OUTPATIENT
Start: 2018-04-02 | End: 2018-04-02 | Stop reason: HOSPADM

## 2018-04-02 RX ORDER — SIMETHICONE
LIQUID (ML) MISCELLANEOUS PRN
Status: DISCONTINUED | OUTPATIENT
Start: 2018-04-02 | End: 2018-04-02 | Stop reason: HOSPADM

## 2018-04-02 RX ORDER — NALOXONE HYDROCHLORIDE 0.4 MG/ML
.1-.4 INJECTION, SOLUTION INTRAMUSCULAR; INTRAVENOUS; SUBCUTANEOUS
Status: DISCONTINUED | OUTPATIENT
Start: 2018-04-02 | End: 2018-04-02 | Stop reason: HOSPADM

## 2018-04-02 RX ORDER — PROPOFOL 10 MG/ML
INJECTION, EMULSION INTRAVENOUS CONTINUOUS PRN
Status: DISCONTINUED | OUTPATIENT
Start: 2018-04-02 | End: 2018-04-02

## 2018-04-02 RX ORDER — ONDANSETRON 2 MG/ML
4 INJECTION INTRAMUSCULAR; INTRAVENOUS
Status: DISCONTINUED | OUTPATIENT
Start: 2018-04-02 | End: 2018-04-02 | Stop reason: HOSPADM

## 2018-04-02 RX ORDER — LIDOCAINE HYDROCHLORIDE 20 MG/ML
INJECTION, SOLUTION INFILTRATION; PERINEURAL PRN
Status: DISCONTINUED | OUTPATIENT
Start: 2018-04-02 | End: 2018-04-02

## 2018-04-02 RX ORDER — PROPOFOL 10 MG/ML
INJECTION, EMULSION INTRAVENOUS PRN
Status: DISCONTINUED | OUTPATIENT
Start: 2018-04-02 | End: 2018-04-02

## 2018-04-02 RX ORDER — FLUMAZENIL 0.1 MG/ML
0.2 INJECTION, SOLUTION INTRAVENOUS
Status: DISCONTINUED | OUTPATIENT
Start: 2018-04-02 | End: 2018-04-02 | Stop reason: HOSPADM

## 2018-04-02 RX ADMIN — SODIUM CHLORIDE, POTASSIUM CHLORIDE, SODIUM LACTATE AND CALCIUM CHLORIDE: 600; 310; 30; 20 INJECTION, SOLUTION INTRAVENOUS at 08:00

## 2018-04-02 RX ADMIN — PROPOFOL 120 MG: 10 INJECTION, EMULSION INTRAVENOUS at 08:02

## 2018-04-02 RX ADMIN — Medication 0.5 ML: at 08:21

## 2018-04-02 RX ADMIN — PROPOFOL 140 MCG/KG/MIN: 10 INJECTION, EMULSION INTRAVENOUS at 08:03

## 2018-04-02 RX ADMIN — WATER 250 ML: 100 IRRIGANT IRRIGATION at 08:20

## 2018-04-02 RX ADMIN — LIDOCAINE HYDROCHLORIDE 40 MG: 20 INJECTION, SOLUTION INFILTRATION; PERINEURAL at 08:02

## 2018-04-02 ASSESSMENT — ENCOUNTER SYMPTOMS: DYSRHYTHMIAS: 1

## 2018-04-02 ASSESSMENT — LIFESTYLE VARIABLES: TOBACCO_USE: 1

## 2018-04-02 NOTE — IP AVS SNAPSHOT
[facility]    [address]    [place] [address]    Phone:  [phone]    Fax:  [phone]                                       After Visit Summary   4/2/2018    Isabel Talbot    MRN: [id number]           After Visit Summary Signature Page     I have received my discharge instructions, and my questions have been answered. I have discussed any challenges I see with this plan with the nurse or doctor.    ..........................................................................................................................................  Patient/Patient Representative Signature      ..........................................................................................................................................  Patient Representative Print Name and Relationship to Patient    ..................................................               ................................................  Date                                            Time    ..........................................................................................................................................  Reviewed by Signature/Title    ...................................................              ..............................................  Date                                                            Time

## 2018-04-02 NOTE — IP AVS SNAPSHOT
MRN:[id number]                      After Visit Summary   4/2/2018    Isabel Talbot    MRN: [id number]           Thank you!     Thank you for choosing [facility] for your care. Our goal is always to provide you with excellent care. Hearing back from our patients is one way we can continue to improve our services. Please take a few minutes to complete the written survey that you may receive in the mail after you visit with us. Thank you!        Patient Information     Date Of Birth          1965        Designated Caregiver       Most Recent Value    Caregiver    Will someone help with your care after discharge? yes    Name of designated caregiver       About your hospital stay     You were admitted on:  April 2, 2018 You last received care in the:  [facility]    You were discharged on:  April 2, 2018       Who to Call     For medical emergencies, please call 911.  For non-urgent questions about your medical care, please call your primary care provider or clinic, None  For questions related to your surgery, please call your surgery clinic        Attending Provider     Provider Specialty    Bernard Love MD Surgery       Primary Care Provider Fax #    Physician No Ref-Primary [phone]      After Care Instructions     Discharge Instructions       No driving or operating machinery until the day after procedure..postfiber            Discharge Instructions       Resume pre procedure diet and medications.  Your doctor recommends that you eat 25 to 30 Grams of fiber daily. The following are some examples of fiber amounts in different foods.    Fruits: Apple (with skin) 1 medium = 4.4 Grams   Banana      1 medium = 3.1 Grams   Oranges     1 orange = 3.1 Grams   Prunes     1 cup, pitted = 12.4 Grams    Juices: Apple, unsweetened w/ added ascorbic acid  1 cup = 0.5 Grams    Grapefruit, white, canned,sweetened  1 cup = 0.2 Grams    Grape, unsweetened w/ added ascorbic acid  1  "cup = 0.5 Grams    Orange     1 cup = 0.7 Grams    Vegetables:   Cooked: Green Beans   1 cup = 4.0 Grams       Carrots   1/2 cup sliced = 2.3 Grams       Peas       1 cup = 8.8 Grams       Potato (baked, with skin)  1 medium = 3.8 Grams    Raw: Cucumber (with peel)  1 cucumber = 1.5 Grams            Lettuce     1 cup shredded = 0.5 Grams            Tomato   1 medium tomato = 1.5 Grams            Spinach  1 cup = 0.7 Grams    Legumes: Baked beans, canned, no salt added  1 cup = 13.9 Grams         Kidney Beans, canned  1 cup = 13.6 Grams         Ahn Beans, canned     1 cup = 11.6 Grams         Lentils, boiled   1 cup = 15.6 Grams    Breads, Pastas, Flours: Bran muffins   1 medium muffin = 5.2 Grams           Oatmeal, cooked  1 cup = 4.0 Grams           White Bread   1 slice = 0.6 Grams           Whole- wheat bread = 1.9 Grams    Pasta and rice, cooked: Macaroni  1 cup = 2.5 Grams           Rice, Brown  1 cup = 3.5 Grams           Rice, white   1 cup = 0.6 Grams           Spaghetti (regular) 1 cup = 2.5 Grams    Nuts: Almonds   1 cup = 17.4 Grams            Peanuts    1 cup = 12.4 Grams            Discharge Instructions       Call [phone] for questions or concerns. Call for increased abdominal pain, rectal bleeding, persistent vomiting or fever over 101.5 F  You will receive a letter in about one week with results.                  Pending Results     No orders found from 3/31/2018 to 4/3/2018.            Admission Information     Date & Time Provider Department Dept. Phone    4/2/2018 Bernard Love MD [facility] [phone]      Your Vitals Were     Blood Pressure Pulse Temperature Respirations Pulse Oximetry       134/54 46 98.3  F (36.8  C) (Temporal) 18 97%       MyChart Information     [employer] lets you send messages to your doctor, view your test results, renew your prescriptions, schedule appointments and more. To sign up, go to [email or web address] . Click on \"Log in\" on the left " "side of the screen, which will take you to the Welcome page. Then click on \"Sign up Now\" on the right side of the page.     You will be asked to enter the access code listed below, as well as some personal information. Please follow the directions to create your username and password.     Your access code is: [email or web address]  Expires: 2018 10:24 AM     Your access code will  in 90 days. If you need help or a new code, please call your [facility] clinic or [phone].        Care EveryWhere ID     This is your Care EveryWhere ID. This could be used by other organizations to access your [facility] medical records  [id number]        Equal Access to Services     [facility] : Delmy Donaldson, carlos bhandari, reanna kaalmatravis dickens, dionne avina. So [facility] [phone].    ATENCIÓN: Si habla español, tiene a golden disposición servicios gratuitos de asistencia lingüística. Llame al [phone].    We comply with applicable federal civil rights laws and [place] laws. We do not discriminate on the basis of race, color, national origin, age, disability, sex, sexual orientation, or gender identity.               Review of your medicines      UNREVIEWED medicines. Ask your doctor about these medicines        Dose / Directions    lisinopril 5 MG tablet   Commonly known as:  PRINIVIL/ZESTRIL   Used for:  Benign essential hypertension        Dose:  5 mg   Take 1 tablet (5 mg) by mouth daily   Quantity:  90 tablet   Refills:  3       meloxicam 15 MG tablet   Commonly known as:  MOBIC   Used for:  Rheumatoid arthritis, involving unspecified site, unspecified rheumatoid factor presence (H)        Dose:  15 mg   Take 1 tablet (15 mg) by mouth daily   Quantity:  90 tablet   Refills:  3       * metoprolol tartrate 25 MG tablet   Commonly known as:  LOPRESSOR   Used for:  Paroxysmal atrial fibrillation (H)        Dose:  25 mg   Take 1 tablet (25 mg) by mouth daily   Quantity:  90 " tablet   Refills:  3       * metoprolol tartrate 50 MG tablet   Commonly known as:  LOPRESSOR   Used for:  Paroxysmal atrial fibrillation (H)        Take 1 tab PO every 2 hours prn racing heart   Quantity:  60 tablet   Refills:  1       vitamin D 2000 UNITS tablet   Used for:  Hypovitaminosis D        Dose:  2000 Units   Take 2,000 Units by mouth daily   Quantity:  100 tablet   Refills:  3       * Notice:  This list has 2 medication(s) that are the same as other medications prescribed for you. Read the directions carefully, and ask your doctor or other care provider to review them with you.             Protect others around you: Learn how to safely use, store and throw away your medicines at www.[employer].org.             Medication List: This is a list of all your medications and when to take them. Check marks below indicate your daily home schedule. Keep this list as a reference.      Medications           Morning Afternoon Evening Bedtime As Needed    lisinopril 5 MG tablet   Commonly known as:  PRINIVIL/ZESTRIL   Take 1 tablet (5 mg) by mouth daily                                meloxicam 15 MG tablet   Commonly known as:  MOBIC   Take 1 tablet (15 mg) by mouth daily                                * metoprolol tartrate 25 MG tablet   Commonly known as:  LOPRESSOR   Take 1 tablet (25 mg) by mouth daily                                * metoprolol tartrate 50 MG tablet   Commonly known as:  LOPRESSOR   Take 1 tab PO every 2 hours prn racing heart                                vitamin D 2000 UNITS tablet   Take 2,000 Units by mouth daily                                * Notice:  This list has 2 medication(s) that are the same as other medications prescribed for you. Read the directions carefully, and ask your doctor or other care provider to review them with you.

## 2018-04-02 NOTE — ANESTHESIA POSTPROCEDURE EVALUATION
Patient: Isabel Talbot    Procedure(s):  Colonoscopy - Wound Class: III-Contaminated    Diagnosis:diarrhea  Diagnosis Additional Information: No value filed.    Anesthesia Type:  MAC    Note:  Anesthesia Post Evaluation    Patient location during evaluation: Bedside and Phase 2  Patient participation: Able to fully participate in evaluation  Level of consciousness: awake and alert  Pain management: adequate  Airway patency: patent  Cardiovascular status: acceptable  Respiratory status: acceptable  Hydration status: acceptable  PONV: none     Anesthetic complications: None          Last vitals:  Vitals:    04/02/18 0723 04/02/18 0830   BP: 172/89 134/54   Pulse: (!) 46    Resp: 18    Temp: 98.3  F (36.8  C)    SpO2: 97%          Electronically Signed By: FARHAN Garcia CRNA  April 2, 2018  8:48 AM

## 2018-04-02 NOTE — H&P (VIEW-ONLY)
Nursing Notes:   Mallory Santana LPN  3/13/2018 10:11 AM  Signed  Patient presents to the clinic for establish care.        Mallory Santana LPN 3/13/2018 9:54 AM      Nursing note reviewed with patient.  Accurracy and completeness verified.   Ms. Talbot is a 52 year old female who:  Patient presents with:  Establish Care    HPI     ICD-10-CM    1. Rheumatoid arthritis, involving unspecified site, unspecified rheumatoid factor presence (H) M06.9    2. Paroxysmal atrial fibrillation (H) I48.0 CARDIOLOGY EVAL ADULT REFERRAL   3. Hypovitaminosis D E55.9 Cholecalciferol (VITAMIN D) 2000 UNITS tablet   4. Anemia, unspecified type D64.9 Vitamin B12     Folate     Ferritin     Thyrotropin GH   5. Benign essential hypertension I10 lisinopril (PRINIVIL/ZESTRIL) 5 MG tablet   6. Encounter for tobacco use cessation counseling Z71.6      Patient presents to establish care.  Recently moved up to [place] from [place] in November 2017.  She has an appointment to see rheumatology on 4/28.  States that her joints are quite sore.  She has been using meloxicam/Mobic for pain.    Paroxysmal atrial fibrillation, states that she has had this for 5 or 6 years.  She is not currently on any blood thinners.  We did discuss the possibility of starting blood thinners today but she would like to speak with cardiology.    Old records need to be obtained.  Check thyroid levels today.  She states her last echocardiogram was not all that long ago and was okay.  I do not have these records.    Cardiology referral sent.    Anemia, etiology unknown.  Check B12, folate, iron levels, thyroid.    Hypertension, improved.  Just started lisinopril 5 mg daily.  She still takes metoprolol.  Evidently she was supposed to have a colonoscopy a couple days ago but this was canceled because she was in the emergency room and her blood pressure was high.    With her upcoming colonoscopy, would need to hold anticoagulation anyway.  Advised to have this started after  her colonoscopy is completed.    Tobacco abuse, ongoing.  We did discuss smoking cessation options.  She is is working on quitting on her own at this time.    Functional Capacity: > or about 4 METS.   Reports that she can climb a flight of stairs without any chest pain/heaviness or shortness of breath.   No orthopnea/paroxysmal nocturnal dyspnea  Review of Systems   Constitutional: Negative for chills, fatigue and fever.   HENT: Negative for congestion and hearing loss.    Eyes: Negative for pain and visual disturbance.   Respiratory: Negative for cough, shortness of breath and wheezing.    Cardiovascular: Positive for palpitations. Negative for chest pain.   Gastrointestinal: Negative for abdominal pain, diarrhea, nausea and vomiting.   Endocrine: Negative for cold intolerance and heat intolerance.   Genitourinary: Negative for dysuria and hematuria.   Musculoskeletal: Positive for arthralgias, back pain, gait problem and joint swelling. Negative for myalgias.   Skin: Negative for pallor.   Allergic/Immunologic: Negative for immunocompromised state.   Neurological: Negative for dizziness and light-headedness.   Hematological: Does not bruise/bleed easily.   Psychiatric/Behavioral: Negative for agitation and confusion.        NELLY:   NELLY-7 SCORE 3/13/2018   Total Score 0     PHQ9:  PHQ-9 SCORE 3/13/2018   Total Score 0       I have personally reviewed the past medical history, past surgical history, medications, allergies, family and social history as listed below, on 3/13/2018.    No Known Allergies    Current Outpatient Prescriptions   Medication Sig Dispense Refill     lisinopril (PRINIVIL/ZESTRIL) 5 MG tablet Take 1 tablet (5 mg) by mouth daily 90 tablet 3     Cholecalciferol (VITAMIN D) 2000 UNITS tablet Take 2,000 Units by mouth daily 100 tablet 3     metoprolol tartrate (LOPRESSOR) 25 MG tablet Take 1 tablet (25 mg) by mouth daily 90 tablet 3     meloxicam (MOBIC) 15 MG tablet Take 1 tablet (15 mg) by mouth  daily 90 tablet 3     metoprolol tartrate (LOPRESSOR) 50 MG tablet Take 1 tab PO every 2 hours prn racing heart 60 tablet 1        Patient Active Problem List    Diagnosis Date Noted     Anemia, unspecified type 03/13/2018     Priority: Medium     Benign essential hypertension 03/13/2018     Priority: Medium     Encounter for tobacco use cessation counseling 03/13/2018     Priority: Medium     Rheumatoid arthritis, involving unspecified site, unspecified rheumatoid factor presence (H) 03/01/2018     Priority: Medium     Paroxysmal atrial fibrillation (H) 03/01/2018     Priority: Medium     Hypovitaminosis D 03/01/2018     Priority: Medium     Diarrhea, unspecified type 03/01/2018     Priority: Medium     Incontinence of feces, unspecified fecal incontinence type 03/01/2018     Priority: Medium     Past Medical History:   Diagnosis Date     Benign essential hypertension 3/13/2018     Encounter for tobacco use cessation counseling 3/13/2018     Hypovitaminosis D 3/1/2018     Paroxysmal atrial fibrillation (H) 3/1/2018     Rheumatoid arthritis, involving unspecified site, unspecified rheumatoid factor presence (H) 3/1/2018     Past Surgical History:   Procedure Laterality Date     HAND SURGERY      s/p fall - age 19 or 20     Social History     Social History     Marital status: Single     Spouse name: N/A     Number of children: N/A     Years of education: N/A     Social History Main Topics     Smoking status: Current Every Day Smoker     Packs/day: 0.50     Years: 38.00     Types: Cigarettes     Smokeless tobacco: Never Used     Alcohol use Yes      Comment: occasional.     Drug use: No     Sexual activity: Not Currently     Other Topics Concern     None     Social History Narrative    Moved to [place] in November 2017 from [place].     Tobacco use.     .     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     Grew-up in [facility]. Was in [place] x13 years, after [place].      Family History   Problem  "Relation Age of Onset     CANCER Mother      throat, tonsillar cancer     HEART DISEASE Father      Hypertension Father      Hyperlipidemia Father        EXAM:   Vitals:    03/13/18 0956   BP: 130/74   BP Location: Right arm   Patient Position: Sitting   Cuff Size: Adult Regular   Pulse: 56   Weight: 136 lb (61.7 kg)       Current Pain Score: Mild Pain (2)     BP Readings from Last 3 Encounters:   03/13/18 130/74   03/12/18 (!) 162/94   03/08/18 145/80    Wt Readings from Last 3 Encounters:   03/13/18 136 lb (61.7 kg)   03/12/18 138 lb 3.2 oz (62.7 kg)   03/08/18 131 lb (59.4 kg)      Estimated body mass index is 22.63 kg/(m^2) as calculated from the following:    Height as of 3/8/18: 5' 5\" (1.651 m).    Weight as of this encounter: 136 lb (61.7 kg).     Physical Exam   Constitutional: She is oriented to person, place, and time. She appears well-developed and well-nourished. No distress.   HENT:   Head: Normocephalic and atraumatic.   Eyes: Conjunctivae are normal. No scleral icterus.   Neck: No thyromegaly present.   Cardiovascular: Normal rate and regular rhythm.    Pulmonary/Chest: Effort normal. No respiratory distress. She has no wheezes.   Abdominal: Soft. There is no tenderness.   Musculoskeletal: She exhibits tenderness and deformity.   Mild rheumatoid arthritis joints degenerative changes noted.   Lymphadenopathy:     She has no cervical adenopathy.   Neurological: She is alert and oriented to person, place, and time. No cranial nerve deficit.   Skin: Skin is warm and dry.   Psychiatric: She has a normal mood and affect.       INVESTIGATIONS:  Results for orders placed or performed during the hospital encounter of 03/08/18   CBC with platelets differential   Result Value Ref Range    WBC 5.2 4.0 - 11.0 10e9/L    RBC Count 3.50 (L) 3.8 - 5.2 10e12/L    Hemoglobin 10.8 (L) 11.7 - 15.7 g/dL    Hematocrit 31.7 (L) 35.0 - 47.0 %    MCV 91 78 - 100 fl    MCH 30.9 26.5 - 33.0 pg    MCHC 34.1 31.5 - 36.5 g/dL    " RDW 13.5 10.0 - 15.0 %    Platelet Count 200 150 - 450 10e9/L    Diff Method Automated Method     % Neutrophils 47.2 %    % Lymphocytes 39.8 %    % Monocytes 8.5 %    % Eosinophils 3.3 %    % Basophils 1.0 %    % Immature Granulocytes 0.2 %    Absolute Neutrophil 2.5 1.6 - 8.3 10e9/L    Absolute Lymphocytes 2.1 0.8 - 5.3 10e9/L    Absolute Monocytes 0.4 0.0 - 1.3 10e9/L    Absolute Eosinophils 0.2 0.0 - 0.7 10e9/L    Absolute Basophils 0.1 0.0 - 0.2 10e9/L    Abs Immature Granulocytes 0.0 0 - 0.4 10e9/L   Basic metabolic panel   Result Value Ref Range    Sodium 139 134 - 144 mmol/L    Potassium 4.0 3.5 - 5.1 mmol/L    Chloride 106 98 - 107 mmol/L    Carbon Dioxide 25 21 - 31 mmol/L    Anion Gap 8 3 - 14 mmol/L    Glucose 119 (H) 70 - 105 mg/dL    Urea Nitrogen 15 7 - 25 mg/dL    Creatinine 0.68 0.60 - 1.20 mg/dL    GFR Estimate >90 >60 mL/min/1.7m2    GFR Estimate If Black >90 >60 mL/min/1.7m2    Calcium 9.2 8.6 - 10.3 mg/dL   Troponin I   Result Value Ref Range    Troponin I ES <0.030 0.000 - 0.034 ug/L   *UA reflex to Microscopic   Result Value Ref Range    Color Urine Yellow     Appearance Urine Clear     Glucose Urine Negative NEG^Negative mg/dL    Bilirubin Urine Negative NEG^Negative    Ketones Urine Negative NEG^Negative mg/dL    Specific Gravity Urine 1.020 1.003 - 1.035    Blood Urine Negative NEG^Negative    pH Urine 6.0 5.0 - 7.0 pH    Protein Albumin Urine Negative NEG^Negative mg/dL    Urobilinogen Urine 0.2 0.2 - 1.0 EU/dL    Nitrite Urine Negative NEG^Negative    Leukocyte Esterase Urine Negative NEG^Negative    Source Midstream Urine    EKG 12-lead, tracing only    Narrative    EKG Interpretation:    Rhythm: Sinus bradycardia  Rate:  37  Axis: Normal  QRS interval: Normal  Conduction: Normal  ST Segments: Normal  QT interval: Normal  APC's Present: No  VPC's Present: No    Impression: Normal EKG other than significant decrease in rate.  No comparison available.    Violet Dawn, DO  Internal  Medicine         ASSESSMENT AND PLAN:  Problem List Items Addressed This Visit        Digestive    Hypovitaminosis D    Relevant Medications    Cholecalciferol (VITAMIN D) 2000 UNITS tablet       Circulatory    Paroxysmal atrial fibrillation (H)    Relevant Orders    CARDIOLOGY EVAL ADULT REFERRAL    Benign essential hypertension    Relevant Medications    lisinopril (PRINIVIL/ZESTRIL) 5 MG tablet       Musculoskeletal and Integumentary    Rheumatoid arthritis, involving unspecified site, unspecified rheumatoid factor presence (H) - Primary       Hematologic    Anemia, unspecified type    Relevant Orders    Vitamin B12    Folate    Ferritin    Thyrotropin GH       Other    Encounter for tobacco use cessation counseling        reviewed diet, exercise and weight control, very strongly urged to quit smoking to reduce cardiovascular risk, cardiovascular risk and specific lipid/LDL goals reviewed, use of aspirin to prevent MI and TIA's discussed  -- Expected clinical course discussed    -- Medications and their side effects discussed    40 minutes spent in face-to-face interaction with patient with greater than 50% spent in counseling and care coordination of listed medical problems.      The 10-year ASCVD risk score (Kryscaitlin RUIZ Jr, et al., 2013) is: 5.3%    Values used to calculate the score:      Age: 52 years      Sex: Female      Is Non- : No      Diabetic: No      Tobacco smoker: Yes      Systolic Blood Pressure: 130 mmHg      Is BP treated: Yes      HDL Cholesterol: 54 mg/dL      Total Cholesterol: 179 mg/dL    Patient Instructions   1. Rheumatoid arthritis, involving unspecified site, unspecified rheumatoid factor presence (H)    2. Paroxysmal atrial fibrillation (H)  - CARDIOLOGY EVAL ADULT REFERRAL    3. Hypovitaminosis D  - Cholecalciferol (VITAMIN D) 2000 UNITS tablet; Take 2,000 Units by mouth daily  Dispense: 100 tablet; Refill: 3    4. Anemia, unspecified type  - Vitamin B12; Future  -  Folate; Future  - Ferritin; Future  - Thyrotropin GH; Future    5. Benign essential hypertension  - lisinopril (PRINIVIL/ZESTRIL) 5 MG tablet; Take 1 tablet (5 mg) by mouth daily  Dispense: 90 tablet; Refill: 3    6. Encounter for tobacco use cessation counseling    QUIT SMOKING!!     -- Choose a quit date (within 1 month). Quitting smoking abruptly is more successful than gradually cutting back.   -- Tell everyone about it (friends, family, coworkers)   -- Think about when yousmoke the most, and what you'll do during those times (eg when in the car, work breaks, etc)     Consider:   -- Start varenicline (Chantix) 1 week before your quit date   -- Start bupropion (Wellbutrin/Zyban) 1 week before your quit date -- Welbutrin 1 pill daily for 1 week then 1 pill twice daily      -- Stop smoking onquit date   -- Starting with quit date, use nicotine lozenges/gum as needed for cravings     -- Quit Plan - Call them in the next 1-2 weeks to help you quitsmoking.                        [phone] ([phone])                        http://www.quitplan.com   -- http://smokefree.gov/       Return as needed for follow-up with Dr. Guillen.    Clinic : [phone]  Appointment line: [phone]      Bakari Guillen MD  Internal Medicine  [facility]

## 2018-04-02 NOTE — OP NOTE
PROCEDURE NOTE    DATE OF SERVICE: 4/2/2018    SURGEON: Bernard Love MD    PRE-OP DIAGNOSIS:    Change in bowel habits, diarrhea        POST-OP DIAGNOSIS:  Same  Normal Exam      PROCEDURE:   Colonoscopy with random biopsies          ANESTHESIA:  ELIESER Call CRNA    INDICATION FOR THE PROCEDURE: The patient is a 52 year old female with diarrhea . The patient has no other complaints  . After explaining the risks to include bleeding, perforation, potential inability toreach the cecum, the patient wished to proceed.    PROCEDURE:After adequate sedation, the patient was in the left lateral decubitus position.  Rectal exam was performed.  There was normal tone and no palpable masses , multiple anal tags.  The colonoscope was introduced into the rectum and advanced to the cecum with Mild difficulty.  The patient's prep was good.  The terminal cecum was reached.  The cecum, ascending, transverse, descending and sigmoid colon was unremarkable. Random biopsies taken from right, left and rectum .  The scope was retroflexed in the rectum.  The rectum was unremarkable  .  The scope was straightened and removed.  The patient tolerated the procedure well.     ESTIMATED BLOOD LOSS: none    COMPLICATIONS:  None    TISSUE REMOVED:  Yes    RECOMMEND:    Follow-up pending pathology      Bernard Love MD FACS

## 2018-04-02 NOTE — INTERVAL H&P NOTE
The history and physical has been reviewed and the patient has been examined.  There are no interim changes to the patient's history or physical condition.    Bernard Love MD

## 2018-04-02 NOTE — ANESTHESIA PREPROCEDURE EVALUATION
Anesthesia Evaluation     .             ROS/MED HX    ENT/Pulmonary:     (+)tobacco use, Current use , . .    Neurologic:  - neg neurologic ROS     Cardiovascular:     (+) hypertension----. : . . . :. dysrhythmias a-fib, .       METS/Exercise Tolerance:     Hematologic:  - neg hematologic  ROS       Musculoskeletal: Comment: RA - neg musculoskeletal ROS (+) arthritis, , , -       GI/Hepatic: Comment: Occasional      (+) GERD bowel prep,       Renal/Genitourinary:         Endo:  - neg endo ROS       Psychiatric:  - neg psychiatric ROS       Infectious Disease:  - neg infectious disease ROS       Malignancy:      - no malignancy   Other:    - neg other ROS                 Physical Exam  Normal systems: cardiovascular and pulmonary    Airway   Mallampati: II  TM distance: >3 FB  Neck ROM: full    Dental   Comment: Teeth are in fair condition, but intact      Cardiovascular   Rhythm and rate: regular and normal      Pulmonary    breath sounds clear to auscultation                    Anesthesia Plan      History & Physical Review      ASA Status:  2 .    NPO Status:  > 8 hours    Plan for MAC with Intravenous and Propofol induction.          Postoperative Care      Consents  Anesthetic plan, risks, benefits and alternatives discussed with:  Patient..                          .

## 2018-04-02 NOTE — OR NURSING
Pt has been discharged to home at 0923 via ambulatory accompanied by RN    Written discharge instructions were provided to Pt.  Prescriptions were N/A.      Patient and adult caring for them verbalize understanding of discharge instructions including no driving until tomorrow and no longer taking narcotic pain medications - no operating mechanical equipment and no making any important decisions.They understand reason for discharge, and necessary follow-up appointments.      Concepcion Bill RN

## 2018-04-03 PROBLEM — K52.831 COLLAGENOUS COLITIS: Status: ACTIVE | Noted: 2018-03-01

## 2018-04-09 ENCOUNTER — OFFICE VISIT (OUTPATIENT)
Dept: FAMILY MEDICINE | Facility: OTHER | Age: 53
End: 2018-04-09
Attending: PHYSICIAN ASSISTANT
Payer: COMMERCIAL

## 2018-04-09 VITALS
HEIGHT: 65 IN | TEMPERATURE: 97.1 F | WEIGHT: 137.2 LBS | HEART RATE: 56 BPM | BODY MASS INDEX: 22.86 KG/M2 | SYSTOLIC BLOOD PRESSURE: 128 MMHG | DIASTOLIC BLOOD PRESSURE: 88 MMHG

## 2018-04-09 DIAGNOSIS — R19.7 DIARRHEA, UNSPECIFIED TYPE: ICD-10-CM

## 2018-04-09 DIAGNOSIS — K52.831 COLLAGENOUS COLITIS: Primary | ICD-10-CM

## 2018-04-09 DIAGNOSIS — F17.210 CIGARETTE NICOTINE DEPENDENCE WITHOUT COMPLICATION: ICD-10-CM

## 2018-04-09 PROCEDURE — 99213 OFFICE O/P EST LOW 20 MIN: CPT | Performed by: PHYSICIAN ASSISTANT

## 2018-04-09 PROCEDURE — G0463 HOSPITAL OUTPT CLINIC VISIT: HCPCS

## 2018-04-09 RX ORDER — LOPERAMIDE HYDROCHLORIDE 2 MG/1
TABLET ORAL
Qty: 30 TABLET | Refills: 11 | Status: ON HOLD | OUTPATIENT
Start: 2018-04-09 | End: 2018-06-28

## 2018-04-09 RX ORDER — NICOTINE 21-14-7MG
1 KIT TRANSDERMAL DAILY
Qty: 70 KIT | Refills: 0 | Status: SHIPPED | OUTPATIENT
Start: 2018-04-09 | End: 2018-12-18

## 2018-04-09 ASSESSMENT — PAIN SCALES - GENERAL: PAINLEVEL: NO PAIN (0)

## 2018-04-09 NOTE — NURSING NOTE
Patient is here for a follow up colonoscopy.  Melissa Ulloa LPN...................4/9/2018   8:12 AM

## 2018-04-09 NOTE — MR AVS SNAPSHOT
"              After Visit Summary   4/9/2018    Isabel Talbot    MRN: [id number]           Patient Information     Date Of Birth          1965        Visit Information        Provider Department      4/9/2018 8:00 AM Leela Carrion PA-C [facility]        Today's Diagnoses     Collagenous colitis    -  1    Diarrhea, unspecified type        Cigarette nicotine dependence without complication           Follow-ups after your visit        Follow-up notes from your care team     Return if symptoms worsen or fail to improve.      Who to contact     If you have questions or need follow up information about today's clinic visit or your schedule please contact [facility] directly at [phone].  Normal or non-critical lab and imaging results will be communicated to you by [employer], letter or phone within 4 business days after the clinic has received the results. If you do not hear from us within 7 days, please contact the clinic through [employer] or phone. If you have a critical or abnormal lab result, we will notify you by phone as soon as possible.  Submit refill requests through [employer] or call your pharmacy and they will forward the refill request to us. Please allow 3 business days for your refill to be completed.          Additional Information About Your Visit        MyChart Information     [employer] lets you send messages to your doctor, view your test results, renew your prescriptions, schedule appointments and more. To sign up, go to [email or web address] . Click on \"Log in\" on the left side of the screen, which will take you to the Welcome page. Then click on \"Sign up Now\" on the right side of the page.     You will be asked to enter the access code listed below, as well as some personal information. Please follow the directions to create your username and password.     Your access code is: [email or web address]  Expires: 5/30/2018 10:24 AM     Your access code will " " in 90 days. If you need help or a new code, please call your [place] clinic or [phone].        Care EveryWhere ID     This is your Care EveryWhere ID. This could be used by other organizations to access your [place] medical records  [id number]        Your Vitals Were     Pulse Temperature Height Breastfeeding? BMI (Body Mass Index)       56 97.1  F (36.2  C) (Tympanic) 5' 5\" (1.651 m) No 22.83 kg/m2        Blood Pressure from Last 3 Encounters:   18 128/88   18 154/90   18 130/74    Weight from Last 3 Encounters:   18 137 lb 3.2 oz (62.2 kg)   18 136 lb (61.7 kg)   18 138 lb 3.2 oz (62.7 kg)              We Performed the Following     SMOKING CESSATION COUNSELING 3-10 MIN          Today's Medication Changes          These changes are accurate as of 18 11:59 PM.  If you have any questions, ask your nurse or doctor.               Start taking these medicines.        Dose/Directions    loperamide 2 MG tablet   Commonly known as:  IMODIUM A-D   Used for:  Collagenous colitis, Diarrhea, unspecified type   Started by:  Leela Carrion PA-C        Take 2 tabs (4 mg) after first loose stool, and then take one tab (2 mg) after each diarrheal stool.  Max of 8 tabs (16 mg) per day.   Quantity:  30 tablet   Refills:  11       nicotine 21-14-7 MG/24HR Kit   Used for:  Cigarette nicotine dependence without complication   Started by:  Leela Carrion PA-C        Dose:  1 patch   Place 1 patch onto the skin daily Use 21 mg patch qd for 6 weeks, then 14 mg x 2 weeks, then 7 mg x 2 weeks   Quantity:  70 kit   Refills:  0            Where to get your medicines      These medications were sent to [facility] [address] [place] [address]     Phone:  [phone]     loperamide 2 MG tablet    nicotine 21-14-7 MG/24HR Kit                Primary Care Provider Office Phone # Fax #    Bakari Guillen MD " [phone]       [address]  [place] [address]        Equal Access to Services     JERAD CARLSON : Hadii aad ku hadkaty Donaldson, kevinda ronyjustyn, zakita kaluisda maninder, dionne christinain hayaan ignaciaoneal lopez laGregmarizol avinaMikey Serrano [facility] [phone].    ATENCIÓN: Si habla español, tiene a golden disposición servicios gratuitos de asistencia lingüística. Llame al [phone].    We comply with applicable federal civil rights laws and [place] laws. We do not discriminate on the basis of race, color, national origin, age, disability, sex, sexual orientation, or gender identity.            Thank you!     Thank you for choosing [facility] AND [facility]  for your care. Our goal is always to provide you with excellent care. Hearing back from our patients is one way we can continue to improve our services. Please take a few minutes to complete the written survey that you may receive in the mail after your visit with us. Thank you!             Your Updated Medication List - Protect others around you: Learn how to safely use, store and throw away your medicines at www.disposemymeds.org.          This list is accurate as of 4/9/18 11:59 PM.  Always use your most recent med list.                   Brand Name Dispense Instructions for use Diagnosis    lisinopril 5 MG tablet    PRINIVIL/ZESTRIL    90 tablet    Take 1 tablet (5 mg) by mouth daily    Benign essential hypertension       loperamide 2 MG tablet    IMODIUM A-D    30 tablet    Take 2 tabs (4 mg) after first loose stool, and then take one tab (2 mg) after each diarrheal stool.  Max of 8 tabs (16 mg) per day.    Collagenous colitis, Diarrhea, unspecified type       meloxicam 15 MG tablet    MOBIC    90 tablet    Take 1 tablet (15 mg) by mouth daily    Rheumatoid arthritis, involving unspecified site, unspecified rheumatoid factor presence (H)       * metoprolol tartrate 25 MG tablet    LOPRESSOR    90 tablet    Take 1 tablet (25 mg) by mouth daily     Paroxysmal atrial fibrillation (H)       * metoprolol tartrate 50 MG tablet    LOPRESSOR    60 tablet    Take 1 tab PO every 2 hours prn racing heart    Paroxysmal atrial fibrillation (H)       nicotine 21-14-7 MG/24HR Kit     70 kit    Place 1 patch onto the skin daily Use 21 mg patch qd for 6 weeks, then 14 mg x 2 weeks, then 7 mg x 2 weeks    Cigarette nicotine dependence without complication       vitamin D 2000 units tablet     100 tablet    Take 2,000 Units by mouth daily    Hypovitaminosis D       * Notice:  This list has 2 medication(s) that are the same as other medications prescribed for you. Read the directions carefully, and ask your doctor or other care provider to review them with you.

## 2018-04-09 NOTE — PROGRESS NOTES
Nursing Notes:   Melissa Ulloa LPN  4/9/2018  8:20 AM  Signed  Patient is here for a follow up colonoscopy.  Melissa Ulloa LPN...................4/9/2018   8:12 AM     HPI:    Isabel Talbot is a 53 year old female who presents to follow-up with her colonoscopy.  She was diagnosed with collagenous colitis.  She is still having diarrhea.  She still having 5-10 bouts of diarrhea daily.  Still having occasional fecal incontinence every few days.  Patient is a smoker.  She is currently taking meloxicam for arthritis.  She sees her rheumatologist in about 2 weeks.  No new concerns.  Wondering what options she can do for treatment.    Past Medical History:   Diagnosis Date     Benign essential hypertension 3/13/2018     Encounter for tobacco use cessation counseling 3/13/2018     Hypovitaminosis D 3/1/2018     Paroxysmal atrial fibrillation (H) 3/1/2018     Rheumatoid arthritis, involving unspecified site, unspecified rheumatoid factor presence (H) 3/1/2018       Past Surgical History:   Procedure Laterality Date     COLONOSCOPY N/A 4/2/2018    F/U 2028     HAND SURGERY      s/p fall - age 19 or 20       Family History   Problem Relation Age of Onset     CANCER Mother      throat, tonsillar cancer     HEART DISEASE Father      Hypertension Father      Hyperlipidemia Father        Social History     Social History     Marital status: Single     Spouse name: N/A     Number of children: N/A     Years of education: N/A     Occupational History     Not on file.     Social History Main Topics     Smoking status: Current Every Day Smoker     Packs/day: 0.50     Years: 38.00     Types: Cigarettes     Smokeless tobacco: Never Used     Alcohol use Yes      Comment: occasional.     Drug use: No     Sexual activity: Not Currently     Other Topics Concern     Not on file     Social History Narrative    Moved to [place] in November 2017 from [place].     Tobacco use.     .     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   "   Grew-up in [facility]. Was in [place] x13 years, after [place].        Current Outpatient Prescriptions   Medication Sig Dispense Refill     nicotine 21-14-7 MG/24HR KIT Place 1 patch onto the skin daily Use 21 mg patch qd for 6 weeks, then 14 mg x 2 weeks, then 7 mg x 2 weeks 70 kit 0     loperamide (IMODIUM A-D) 2 MG tablet Take 2 tabs (4 mg) after first loose stool, and then take one tab (2 mg) after each diarrheal stool.  Max of 8 tabs (16 mg) per day. 30 tablet 11     lisinopril (PRINIVIL/ZESTRIL) 5 MG tablet Take 1 tablet (5 mg) by mouth daily 90 tablet 3     Cholecalciferol (VITAMIN D) 2000 UNITS tablet Take 2,000 Units by mouth daily 100 tablet 3     metoprolol tartrate (LOPRESSOR) 25 MG tablet Take 1 tablet (25 mg) by mouth daily 90 tablet 3     meloxicam (MOBIC) 15 MG tablet Take 1 tablet (15 mg) by mouth daily 90 tablet 3     metoprolol tartrate (LOPRESSOR) 50 MG tablet Take 1 tab PO every 2 hours prn racing heart 60 tablet 1       No Known Allergies    REVIEW OF SYSTEMS:  Refer to HPI.    EXAM:   Vitals:    /88 (BP Location: Right arm, Patient Position: Chair, Cuff Size: Adult Regular)  Pulse 56  Temp 97.1  F (36.2  C) (Tympanic)  Ht 5' 5\" (1.651 m)  Wt 137 lb 3.2 oz (62.2 kg)  Breastfeeding? No  BMI 22.83 kg/m2    General Appearance: Pleasant, alert, appropriate appearance for age. No acute distress  Skin: no rash or abnormalities  Psychiatric Exam: Alert and oriented - appropriate affect.    PHQ Depression Screen  PHQ-9 SCORE 3/13/2018 4/9/2018   Total Score 0 0       ASSESSMENT AND PLAN:    1. Collagenous colitis    2. Diarrhea, unspecified type    3. Cigarette nicotine dependence without complication          Discussed the diagnosis at length.  Gave patient up-to-date information about collagenous colitis.  Discuss treatment options.  I recommended to discontinue her meloxicam as this is likely exacerbating her symptoms.  Encouraged her to discuss the medication along with " her diagnosis with her rheumatologist in 2 weeks.  Encouraged to discuss other treatment options that she can use with her rheumatologist.  In the meantime she can use Tylenol 1000 mg up to 4 times daily as needed for pain and discomfort.  Highly recommended to quit smoking as this is also likely exacerbating her symptoms.  Started patient on nicotine patches.  Greater than 3 minutes was spent in consultation and education regarding tobacco cessation due to diagnosis of cigarette nicotine dependence without complication and associated long-term complications of continued tobacco use.    Discussed other treatment options including Imodium or budesonide.  At this time patient would like to hold off on budesonide and would like to try the Imodium along with stopping the meloxicam and smoking cessation.  She can call in the next 1-2 weeks if symptoms are not calming down for a prescription for budesonide if needed.    Gave warning signs and symptoms.  Gave patient education.    Leela Carrion..................4/9/2018 8:20 AM

## 2018-04-10 ASSESSMENT — PATIENT HEALTH QUESTIONNAIRE - PHQ9: SUM OF ALL RESPONSES TO PHQ QUESTIONS 1-9: 0

## 2018-04-23 ENCOUNTER — TRANSFERRED RECORDS (OUTPATIENT)
Dept: HEALTH INFORMATION MANAGEMENT | Facility: OTHER | Age: 53
End: 2018-04-23

## 2018-06-28 ENCOUNTER — APPOINTMENT (OUTPATIENT)
Dept: CARDIOLOGY | Facility: OTHER | Age: 53
End: 2018-06-28
Attending: INTERNAL MEDICINE
Payer: COMMERCIAL

## 2018-06-28 ENCOUNTER — HOSPITAL ENCOUNTER (OUTPATIENT)
Facility: OTHER | Age: 53
Setting detail: OBSERVATION
Discharge: HOME | End: 2018-06-28
Attending: EMERGENCY MEDICINE | Admitting: INTERNAL MEDICINE
Payer: COMMERCIAL

## 2018-06-28 ENCOUNTER — OFFICE VISIT (OUTPATIENT)
Dept: FAMILY MEDICINE | Facility: OTHER | Age: 53
End: 2018-06-28
Attending: PHYSICIAN ASSISTANT
Payer: COMMERCIAL

## 2018-06-28 ENCOUNTER — HOSPITAL ENCOUNTER (OUTPATIENT)
Dept: GENERAL RADIOLOGY | Facility: OTHER | Age: 53
Discharge: HOME | End: 2018-06-28
Attending: PHYSICIAN ASSISTANT | Admitting: PHYSICIAN ASSISTANT
Payer: COMMERCIAL

## 2018-06-28 VITALS
BODY MASS INDEX: 22.48 KG/M2 | HEIGHT: 65 IN | SYSTOLIC BLOOD PRESSURE: 128 MMHG | RESPIRATION RATE: 18 BRPM | DIASTOLIC BLOOD PRESSURE: 67 MMHG | WEIGHT: 134.92 LBS | OXYGEN SATURATION: 98 % | TEMPERATURE: 96.4 F

## 2018-06-28 VITALS
BODY MASS INDEX: 22.73 KG/M2 | SYSTOLIC BLOOD PRESSURE: 167 MMHG | HEIGHT: 65 IN | WEIGHT: 136.4 LBS | DIASTOLIC BLOOD PRESSURE: 84 MMHG | HEART RATE: 37 BPM

## 2018-06-28 DIAGNOSIS — R07.89 CHEST PRESSURE: ICD-10-CM

## 2018-06-28 DIAGNOSIS — R07.9 CHEST PAIN, UNSPECIFIED TYPE: Primary | ICD-10-CM

## 2018-06-28 DIAGNOSIS — I10 BENIGN ESSENTIAL HYPERTENSION: ICD-10-CM

## 2018-06-28 DIAGNOSIS — I10 ELEVATED BLOOD PRESSURE READING IN OFFICE WITH DIAGNOSIS OF HYPERTENSION: ICD-10-CM

## 2018-06-28 DIAGNOSIS — R00.1 SINUS BRADYCARDIA: ICD-10-CM

## 2018-06-28 DIAGNOSIS — R00.1 SINUS BRADYCARDIA: Primary | ICD-10-CM

## 2018-06-28 LAB
ALBUMIN SERPL-MCNC: 3.9 G/DL (ref 3.5–5.7)
ALP SERPL-CCNC: 50 U/L (ref 34–104)
ALT SERPL W P-5'-P-CCNC: 40 U/L (ref 7–52)
ANION GAP SERPL CALCULATED.3IONS-SCNC: 7 MMOL/L (ref 3–14)
AST SERPL W P-5'-P-CCNC: 25 U/L (ref 13–39)
BASOPHILS # BLD AUTO: 0.1 10E9/L (ref 0–0.2)
BASOPHILS NFR BLD AUTO: 1 %
BILIRUB SERPL-MCNC: 0.4 MG/DL (ref 0.3–1)
BUN SERPL-MCNC: 18 MG/DL (ref 7–25)
CALCIUM SERPL-MCNC: 9.4 MG/DL (ref 8.6–10.3)
CHLORIDE SERPL-SCNC: 108 MMOL/L (ref 98–107)
CO2 SERPL-SCNC: 25 MMOL/L (ref 21–31)
CREAT SERPL-MCNC: 0.69 MG/DL (ref 0.6–1.2)
D DIMER PPP DDU-MCNC: <200 NG/ML D-DU (ref 0–230)
DIFFERENTIAL METHOD BLD: ABNORMAL
EOSINOPHIL # BLD AUTO: 0.2 10E9/L (ref 0–0.7)
EOSINOPHIL NFR BLD AUTO: 3.9 %
ERYTHROCYTE [DISTWIDTH] IN BLOOD BY AUTOMATED COUNT: 16 % (ref 10–15)
GFR SERPL CREATININE-BSD FRML MDRD: 89 ML/MIN/1.7M2
GLUCOSE SERPL-MCNC: 92 MG/DL (ref 70–105)
HCT VFR BLD AUTO: 35.9 % (ref 35–47)
HGB BLD-MCNC: 12.3 G/DL (ref 11.7–15.7)
IMM GRANULOCYTES # BLD: 0 10E9/L (ref 0–0.4)
IMM GRANULOCYTES NFR BLD: 0.3 %
LYMPHOCYTES # BLD AUTO: 2.3 10E9/L (ref 0.8–5.3)
LYMPHOCYTES NFR BLD AUTO: 39.5 %
MCH RBC QN AUTO: 31.8 PG (ref 26.5–33)
MCHC RBC AUTO-ENTMCNC: 34.3 G/DL (ref 31.5–36.5)
MCV RBC AUTO: 93 FL (ref 78–100)
MONOCYTES # BLD AUTO: 0.5 10E9/L (ref 0–1.3)
MONOCYTES NFR BLD AUTO: 8.5 %
NEUTROPHILS # BLD AUTO: 2.8 10E9/L (ref 1.6–8.3)
NEUTROPHILS NFR BLD AUTO: 46.8 %
PLATELET # BLD AUTO: 219 10E9/L (ref 150–450)
POTASSIUM SERPL-SCNC: 3.6 MMOL/L (ref 3.5–5.1)
PROT SERPL-MCNC: 6.5 G/DL (ref 6.4–8.9)
RBC # BLD AUTO: 3.87 10E12/L (ref 3.8–5.2)
SODIUM SERPL-SCNC: 140 MMOL/L (ref 134–144)
TROPONIN I SERPL-MCNC: <0.03 UG/L (ref 0–0.03)
TROPONIN I SERPL-MCNC: <0.03 UG/L (ref 0–0.03)
WBC # BLD AUTO: 5.9 10E9/L (ref 4–11)

## 2018-06-28 PROCEDURE — 99285 EMERGENCY DEPT VISIT HI MDM: CPT | Mod: Z6 | Performed by: EMERGENCY MEDICINE

## 2018-06-28 PROCEDURE — 71046 X-RAY EXAM CHEST 2 VIEWS: CPT

## 2018-06-28 PROCEDURE — 40000275 ZZH STATISTIC RCP TIME EA 10 MIN

## 2018-06-28 PROCEDURE — 93005 ELECTROCARDIOGRAM TRACING: CPT | Performed by: EMERGENCY MEDICINE

## 2018-06-28 PROCEDURE — 93306 TTE W/DOPPLER COMPLETE: CPT | Mod: 26 | Performed by: INTERNAL MEDICINE

## 2018-06-28 PROCEDURE — 99214 OFFICE O/P EST MOD 30 MIN: CPT | Mod: 25 | Performed by: PHYSICIAN ASSISTANT

## 2018-06-28 PROCEDURE — 85025 COMPLETE CBC W/AUTO DIFF WBC: CPT | Performed by: PHYSICIAN ASSISTANT

## 2018-06-28 PROCEDURE — 25000132 ZZH RX MED GY IP 250 OP 250 PS 637: Performed by: INTERNAL MEDICINE

## 2018-06-28 PROCEDURE — 0296T ZIO PATCH HOLTER: CPT | Performed by: INTERNAL MEDICINE

## 2018-06-28 PROCEDURE — 93306 TTE W/DOPPLER COMPLETE: CPT

## 2018-06-28 PROCEDURE — G0463 HOSPITAL OUTPT CLINIC VISIT: HCPCS | Mod: 25

## 2018-06-28 PROCEDURE — 36415 COLL VENOUS BLD VENIPUNCTURE: CPT | Performed by: PHYSICIAN ASSISTANT

## 2018-06-28 PROCEDURE — 93005 ELECTROCARDIOGRAM TRACING: CPT

## 2018-06-28 PROCEDURE — 93010 ELECTROCARDIOGRAM REPORT: CPT | Performed by: INTERNAL MEDICINE

## 2018-06-28 PROCEDURE — G0378 HOSPITAL OBSERVATION PER HR: HCPCS

## 2018-06-28 PROCEDURE — 84484 ASSAY OF TROPONIN QUANT: CPT | Performed by: PHYSICIAN ASSISTANT

## 2018-06-28 PROCEDURE — 93005 ELECTROCARDIOGRAM TRACING: CPT | Mod: 91

## 2018-06-28 PROCEDURE — 99236 HOSP IP/OBS SAME DATE HI 85: CPT | Performed by: INTERNAL MEDICINE

## 2018-06-28 PROCEDURE — 84484 ASSAY OF TROPONIN QUANT: CPT | Performed by: INTERNAL MEDICINE

## 2018-06-28 PROCEDURE — 0298T ZZC EXT ECG > 48HR TO 21 DAY REVIEW AND INTERPRETATN: CPT | Performed by: INTERNAL MEDICINE

## 2018-06-28 PROCEDURE — 99291 CRITICAL CARE FIRST HOUR: CPT | Mod: 25 | Performed by: EMERGENCY MEDICINE

## 2018-06-28 PROCEDURE — 85379 FIBRIN DEGRADATION QUANT: CPT | Performed by: PHYSICIAN ASSISTANT

## 2018-06-28 PROCEDURE — 80053 COMPREHEN METABOLIC PANEL: CPT | Performed by: PHYSICIAN ASSISTANT

## 2018-06-28 RX ORDER — ACETAMINOPHEN 325 MG/1
650 TABLET ORAL EVERY 4 HOURS PRN
Status: DISCONTINUED | OUTPATIENT
Start: 2018-06-28 | End: 2018-06-28 | Stop reason: HOSPADM

## 2018-06-28 RX ORDER — ALUMINA, MAGNESIA, AND SIMETHICONE 2400; 2400; 240 MG/30ML; MG/30ML; MG/30ML
30 SUSPENSION ORAL EVERY 4 HOURS PRN
Status: DISCONTINUED | OUTPATIENT
Start: 2018-06-28 | End: 2018-06-28 | Stop reason: HOSPADM

## 2018-06-28 RX ORDER — AMLODIPINE BESYLATE 5 MG/1
5 TABLET ORAL DAILY
Status: DISCONTINUED | OUTPATIENT
Start: 2018-06-29 | End: 2018-06-28 | Stop reason: HOSPADM

## 2018-06-28 RX ORDER — NICOTINE 21 MG/24HR
1 PATCH, TRANSDERMAL 24 HOURS TRANSDERMAL DAILY
Status: DISCONTINUED | OUTPATIENT
Start: 2018-06-28 | End: 2018-06-28 | Stop reason: HOSPADM

## 2018-06-28 RX ORDER — NITROGLYCERIN 0.4 MG/1
0.4 TABLET SUBLINGUAL EVERY 5 MIN PRN
Status: DISCONTINUED | OUTPATIENT
Start: 2018-06-28 | End: 2018-06-28 | Stop reason: HOSPADM

## 2018-06-28 RX ORDER — NICOTINE 7 MG/24H
PATCH, EXTENDED RELEASE TRANSDERMAL
Status: ON HOLD | COMMUNITY
Start: 2018-04-09 | End: 2018-06-28

## 2018-06-28 RX ORDER — NICOTINE 14 MG/24H
PATCH, EXTENDED RELEASE TRANSDERMAL
Status: ON HOLD | COMMUNITY
Start: 2018-04-09 | End: 2018-06-28

## 2018-06-28 RX ORDER — PREDNISONE 5 MG/1
5 TABLET ORAL DAILY
COMMUNITY
Start: 2018-06-07 | End: 2018-08-08

## 2018-06-28 RX ORDER — LIDOCAINE 40 MG/G
CREAM TOPICAL
Status: DISCONTINUED | OUTPATIENT
Start: 2018-06-28 | End: 2018-06-28 | Stop reason: HOSPADM

## 2018-06-28 RX ORDER — NICOTINE 21 MG/24H
PATCH, EXTENDED RELEASE TRANSDERMAL
Status: ON HOLD | COMMUNITY
Start: 2018-04-09 | End: 2018-06-28

## 2018-06-28 RX ORDER — NALOXONE HYDROCHLORIDE 0.4 MG/ML
.1-.4 INJECTION, SOLUTION INTRAMUSCULAR; INTRAVENOUS; SUBCUTANEOUS
Status: DISCONTINUED | OUTPATIENT
Start: 2018-06-28 | End: 2018-06-28 | Stop reason: HOSPADM

## 2018-06-28 RX ORDER — METHOTREXATE 2.5 MG/1
20 TABLET ORAL WEEKLY
COMMUNITY
Start: 2018-06-18 | End: 2018-07-17 | Stop reason: DRUGHIGH

## 2018-06-28 RX ORDER — LISINOPRIL 5 MG/1
5 TABLET ORAL DAILY
Status: DISCONTINUED | OUTPATIENT
Start: 2018-06-29 | End: 2018-06-28 | Stop reason: HOSPADM

## 2018-06-28 RX ORDER — FOLIC ACID 1 MG/1
1 TABLET ORAL DAILY
COMMUNITY
Start: 2018-05-03 | End: 2019-05-02

## 2018-06-28 RX ORDER — PREDNISONE 5 MG/1
5 TABLET ORAL DAILY
Status: DISCONTINUED | OUTPATIENT
Start: 2018-06-29 | End: 2018-06-28 | Stop reason: HOSPADM

## 2018-06-28 RX ORDER — LISINOPRIL 5 MG/1
10 TABLET ORAL DAILY
Qty: 90 TABLET | Refills: 3 | Status: SHIPPED | OUTPATIENT
Start: 2018-06-28 | End: 2018-07-05

## 2018-06-28 RX ADMIN — NICOTINE 1 PATCH: 14 PATCH, EXTENDED RELEASE TRANSDERMAL at 14:19

## 2018-06-28 ASSESSMENT — ANXIETY QUESTIONNAIRES
5. BEING SO RESTLESS THAT IT IS HARD TO SIT STILL: SEVERAL DAYS
6. BECOMING EASILY ANNOYED OR IRRITABLE: NOT AT ALL
3. WORRYING TOO MUCH ABOUT DIFFERENT THINGS: SEVERAL DAYS
7. FEELING AFRAID AS IF SOMETHING AWFUL MIGHT HAPPEN: NOT AT ALL
GAD7 TOTAL SCORE: 6
2. NOT BEING ABLE TO STOP OR CONTROL WORRYING: SEVERAL DAYS
1. FEELING NERVOUS, ANXIOUS, OR ON EDGE: NEARLY EVERY DAY
IF YOU CHECKED OFF ANY PROBLEMS ON THIS QUESTIONNAIRE, HOW DIFFICULT HAVE THESE PROBLEMS MADE IT FOR YOU TO DO YOUR WORK, TAKE CARE OF THINGS AT HOME, OR GET ALONG WITH OTHER PEOPLE: NOT DIFFICULT AT ALL

## 2018-06-28 ASSESSMENT — ENCOUNTER SYMPTOMS
LIGHT-HEADEDNESS: 0
ARTHRALGIAS: 0
SHORTNESS OF BREATH: 0
NAUSEA: 0
CHILLS: 0
AGITATION: 0
CHEST TIGHTNESS: 1
VOMITING: 0
DYSURIA: 0
FEVER: 0

## 2018-06-28 ASSESSMENT — ACTIVITIES OF DAILY LIVING (ADL)
COMMUNICATION: 0 - UNDERSTANDS/COMMUNICATES WITHOUT DIFFICULTY
DRESS: 0-->INDEPENDENT
BATHING: 0 - INDEPENDENT
BATHING: 0-->INDEPENDENT
TRANSFERRING: 0-->INDEPENDENT
TOILETING: 0-->INDEPENDENT
RETIRED_COMMUNICATION: 0-->UNDERSTANDS/COMMUNICATES WITHOUT DIFFICULTY
AMBULATION: 0-->INDEPENDENT
TRANSFERRING: 0 - INDEPENDENT
AMBULATION: 0 - INDEPENDENT
FALL_HISTORY_WITHIN_LAST_SIX_MONTHS: NO
EATING: 0 - INDEPENDENT
COGNITION: 0 - NO COGNITION ISSUES REPORTED
TOILETING: 0 - INDEPENDENT
DRESS: 0 - INDEPENDENT
RETIRED_EATING: 0-->INDEPENDENT
SWALLOWING: 0 - SWALLOWS FOODS/LIQUIDS WITHOUT DIFFICULTY
SWALLOWING: 0-->SWALLOWS FOODS/LIQUIDS WITHOUT DIFFICULTY

## 2018-06-28 ASSESSMENT — PATIENT HEALTH QUESTIONNAIRE - PHQ9: 5. POOR APPETITE OR OVEREATING: NOT AT ALL

## 2018-06-28 NOTE — IP AVS SNAPSHOT
[facility]    [address]    [place] [address]    Phone:  [phone]    Fax:  [phone]                                       After Visit Summary   6/28/2018    Isabel Talbot    MRN: [id number]           After Visit Summary Signature Page     I have received my discharge instructions, and my questions have been answered. I have discussed any challenges I see with this plan with the nurse or doctor.    ..........................................................................................................................................  Patient/Patient Representative Signature      ..........................................................................................................................................  Patient Representative Print Name and Relationship to Patient    ..................................................               ................................................  Date                                            Time    ..........................................................................................................................................  Reviewed by Signature/Title    ...................................................              ..............................................  Date                                                            Time

## 2018-06-28 NOTE — NURSING NOTE
Patient presents to clinic with c/o chest tightness on Saturday, on and off on Sunday. None since Sunday.  NO chest pain.  Courtney Esparza LPN ...... 6/28/2018 8:40 AM

## 2018-06-28 NOTE — PROGRESS NOTES
Nursing Notes:   Deloris Esparza LPN  6/28/2018  8:50 AM  Signed  Patient presents to clinic with c/o chest tightness on Saturday, on and off on Sunday. None since Sunday.  NO chest pain.  Courtney Esparza LPN ...... 6/28/2018 8:40 AM          HPI:    Isabel Talbot is a 53 year old female who presents for chest tightness on Saturday 6/23, on and off on Sunday. None since Sunday 6/24.  NO chest pain today. History of A fib. Symptoms calmed down with rest. Methotrexate was increased from 15 to 20 mg per week at the 6/7 appointment.   BP and pulse was fine on Saturday. Lasted for 1 hour on Saturday. 30 seconds on Sunday about 10 times.  Had a few episodes on Monday.   Pressure, felt like she was going to pass out. Lightheaded.  No room spinning.  No palpitations. No sharp pain. Squeezing pain. No arm pain, jaw pain. Center lower chest. Occasional heartburn. Tx tums. Did not feel like normal heartburn. Get nervous a lot.   Patient normally has a lower blood pressure however it is lower today.  No med changes recently.  Currently takes metoprolol 25 mg and lisinopril 5 mg for hypertension.  No belly pain, diarrhea, constipation, dysuria, frequency, urgency.  No lower extremity edema.    Past Medical History:   Diagnosis Date     Benign essential hypertension 3/13/2018     Encounter for tobacco use cessation counseling 3/13/2018     Hypovitaminosis D 3/1/2018     Paroxysmal atrial fibrillation (H) 3/1/2018     Rheumatoid arthritis, involving unspecified site, unspecified rheumatoid factor presence (H) 3/1/2018       Past Surgical History:   Procedure Laterality Date     COLONOSCOPY N/A 4/2/2018    F/U 2028     HAND SURGERY      s/p fall - age 19 or 20       Family History   Problem Relation Age of Onset     Cancer Mother      throat, tonsillar cancer     HEART DISEASE Father      Hypertension Father      Hyperlipidemia Father        Social History     Social History     Marital status: Single     Spouse name:  "N/A     Number of children: N/A     Years of education: N/A     Occupational History     Not on file.     Social History Main Topics     Smoking status: Current Every Day Smoker     Packs/day: 0.50     Years: 38.00     Types: Cigarettes     Smokeless tobacco: Never Used     Alcohol use Yes      Comment: occasional.     Drug use: No     Sexual activity: Not Currently     Other Topics Concern     Not on file     Social History Narrative    Moved to [place] in November 2017 from [place].     Tobacco use.     .     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     Grew-up in [place]. Was in [place] x13 years, after [place].        Current Outpatient Prescriptions   Medication Sig Dispense Refill     Aspirin-Acetaminophen-Caffeine (GOODYS EXTRA STRENGTH) 500-325-65 MG PACK Take 1 packet by mouth 2 times daily as needed       cholecalciferol 1000 units TABS Take 1000 units by mouth alternating with 2000 units every other day       folic acid (FOLVITE) 1 MG tablet Take 1 mg by mouth daily        lisinopril (PRINIVIL/ZESTRIL) 5 MG tablet Take 2 tablets (10 mg) by mouth daily 90 tablet 3     methotrexate sodium 2.5 MG TABS 20 mg by Oral or Feeding Tube route once a week Take on Fridays       metoprolol tartrate (LOPRESSOR) 50 MG tablet Take 1 tab PO every 2 hours prn racing heart 60 tablet 1     nicotine 21-14-7 MG/24HR KIT Place 1 patch onto the skin daily Use 21 mg patch qd for 6 weeks, then 14 mg x 2 weeks, then 7 mg x 2 weeks 70 kit 0     predniSONE (DELTASONE) 5 MG tablet Take 5 mg by mouth daily          No Known Allergies    REVIEW OF SYSTEMS:  Refer to HPI.    EXAM:   Vitals:    /84  Pulse (!) 37  Ht 5' 5\" (1.651 m)  Wt 136 lb 6.4 oz (61.9 kg)  BMI 22.7 kg/m2    General Appearance: Pleasant, alert, appropriate appearance for age. No acute distress  Ear Exam: Normal TM's bilaterally, normal grey, and translucent. Normal auditory canals and external ears. Non-tender.   OroPharynx Exam: Dental " hygiene adequate. Normal buccal mucosa. Normal pharynx.  Neck Exam:  Supple, no masses or nodes.  Chest/Respiratory Exam: Normal chest wall and respirations. Clear to auscultation.  Cardiovascular Exam: Regular rate and rhythm. S1, S2, no murmur, click, gallop, or rubs.  Skin: no rash or abnormalities  Psychiatric Exam: Alert and oriented - appropriate affect.    PHQ Depression Screen  PHQ-9 SCORE 3/13/2018 4/9/2018 6/28/2018   Total Score 0 0 2       Results for orders placed or performed in visit on 06/28/18   CBC and Differential   Result Value Ref Range    WBC 5.9 4.0 - 11.0 10e9/L    RBC Count 3.87 3.8 - 5.2 10e12/L    Hemoglobin 12.3 11.7 - 15.7 g/dL    Hematocrit 35.9 35.0 - 47.0 %    MCV 93 78 - 100 fl    MCH 31.8 26.5 - 33.0 pg    MCHC 34.3 31.5 - 36.5 g/dL    RDW 16.0 (H) 10.0 - 15.0 %    Platelet Count 219 150 - 450 10e9/L    Diff Method Automated Method     % Neutrophils 46.8 %    % Lymphocytes 39.5 %    % Monocytes 8.5 %    % Eosinophils 3.9 %    % Basophils 1.0 %    % Immature Granulocytes 0.3 %    Absolute Neutrophil 2.8 1.6 - 8.3 10e9/L    Absolute Lymphocytes 2.3 0.8 - 5.3 10e9/L    Absolute Monocytes 0.5 0.0 - 1.3 10e9/L    Absolute Eosinophils 0.2 0.0 - 0.7 10e9/L    Absolute Basophils 0.1 0.0 - 0.2 10e9/L    Abs Immature Granulocytes 0.0 0 - 0.4 10e9/L   Comprehensive metabolic panel   Result Value Ref Range    Sodium 140 134 - 144 mmol/L    Potassium 3.6 3.5 - 5.1 mmol/L    Chloride 108 (H) 98 - 107 mmol/L    Carbon Dioxide 25 21 - 31 mmol/L    Anion Gap 7 3 - 14 mmol/L    Glucose 92 70 - 105 mg/dL    Urea Nitrogen 18 7 - 25 mg/dL    Creatinine 0.69 0.60 - 1.20 mg/dL    GFR Estimate 89 >60 mL/min/1.7m2    GFR Estimate If Black >90 >60 mL/min/1.7m2    Calcium 9.4 8.6 - 10.3 mg/dL    Bilirubin Total 0.4 0.3 - 1.0 mg/dL    Albumin 3.9 3.5 - 5.7 g/dL    Protein Total 6.5 6.4 - 8.9 g/dL    Alkaline Phosphatase 50 34 - 104 U/L    ALT 40 7 - 52 U/L    AST 25 13 - 39 U/L   Troponin I   Result Value Ref  Range    Troponin I ES <0.030 0.000 - 0.034 ug/L   D-Dimer,Quantitative   Result Value Ref Range    D-Dimer ng/mL <200 0 - 230 ng/ml D-DU       ASSESSMENT AND PLAN:    1. Sinus bradycardia    2. Chest pressure    3. Elevated blood pressure reading in office with diagnosis of hypertension          Patient had unremarkable CBC, CMP, troponin, d-dimer.  EKG showed sinus bradycardia at a rate of 35.  This rate is lower than her previous vitals.   Patient's blood pressure is increased at 162/102.    Currently asymptomatic.  EKG is otherwise unremarkable.    Consulted initially with the ER physician who recommended speaking with cardiology.  Consulted with Dr. Calix in cardiology.  He recommended that the patient be transferred to the emergency room and the patient be admitted for monitoring.  Patient was then transferred to the emergency room for further management.    Leela Carrion PA-C..................6/28/2018 8:47 AM

## 2018-06-28 NOTE — PROGRESS NOTES
Nurse to nurse report received from MEE Bowers in the ED. All questions answered.Mayra Ordaz RN on 6/28/2018 at 12:32 PM

## 2018-06-28 NOTE — PHARMACY - DISCHARGE MEDICATION RECONCILIATION AND EDUCATION
Pharmacy:  Discharge Counseling and Medication Reconciliation    Isabel Talbot  [address] [place] [address]  [phone] (home)   53 year old female  PCP: Bakari Guillen    Allergies: Review of patient's allergies indicates no known allergies.    Discharge Counseling:    Pharmacist met with patient (and/or family) today to review the medication portion of the After Visit Summary (with an emphasis on NEW medications) and to address patient's questions/concerns.    Summary of Education: Met with patient to discuss medication changes at discharge.  We discussed increasing her lisinopril to 10 mg once daily and stopping her scheduled metoprolol.  Patient will keep her PRN metoprolol on hand to use if needed.  We also reviewed methotrexate as this is a new medication for the patient in the past few months.    Materials Provided:  MedCounselor sheets printed from Clinical Pharmacology on: none  ISMP safety brief on Methotrexate was provided to patient at discharge    Discharge Medication Reconciliation:    Rosy Vasquez RPH has reviewed the patient's discharge medication orders and has compared them to the inpatient medication administration record and to what the patient was taking prior to admission - any discrepancies have been resolved.    It has been determined that the patient has an adequate supply of medications available or which can be obtained from the patient's preferred pharmacy, which HE/SHE has confirmed as: [facility]     Thank you for the consult.    Rosy Vasquez RPH........June 28, 2018 6:51 PM

## 2018-06-28 NOTE — ED PROVIDER NOTES
History     Chief Complaint   Patient presents with     Bradycardia     The history is provided by the patient.     Isabel Talbot is a 53 year old female who presented to clinic today for a couple episodes of chest pressure over the weekend. She had an episode that lasted for about an hour Saturday. Sunday she had numerous shorter episodes and a few also on Monday. She has been asymptomatic since that time. When she presented she was found to be very bradycardic with a rate of 35 bpm on her EKG. She is asymptomatic at this time. She says that she always runs low and her heart rate is usually in the 40s or 50s. There is an EKG back in March that shows a similar EKG of 37 bpm. Clinic provider called and spoke with Dr. Calix from cardiology who recommended he come to the emergency department for probable transfer to cardiology in [place].    Problem List:    Patient Active Problem List    Diagnosis Date Noted     Anemia, unspecified type 03/13/2018     Priority: Medium     Benign essential hypertension 03/13/2018     Priority: Medium     Encounter for tobacco use cessation counseling 03/13/2018     Priority: Medium     Rheumatoid arthritis, involving unspecified site, unspecified rheumatoid factor presence (H) 03/01/2018     Priority: Medium     Paroxysmal atrial fibrillation (H) 03/01/2018     Priority: Medium     Hypovitaminosis D 03/01/2018     Priority: Medium     Collagenous colitis 03/01/2018     Priority: Medium     Incontinence of feces, unspecified fecal incontinence type 03/01/2018     Priority: Medium        Past Medical History:    Past Medical History:   Diagnosis Date     Benign essential hypertension 3/13/2018     Encounter for tobacco use cessation counseling 3/13/2018     Hypovitaminosis D 3/1/2018     Paroxysmal atrial fibrillation (H) 3/1/2018     Rheumatoid arthritis, involving unspecified site, unspecified rheumatoid factor presence (H) 3/1/2018       Past Surgical History:    Past Surgical  "History:   Procedure Laterality Date     COLONOSCOPY N/A 4/2/2018    F/U 2028     HAND SURGERY      s/p fall - age 19 or 20       Family History:    Family History   Problem Relation Age of Onset     Cancer Mother      throat, tonsillar cancer     HEART DISEASE Father      Hypertension Father      Hyperlipidemia Father        Social History:  Marital Status:  Single [1]  Social History   Substance Use Topics     Smoking status: Current Every Day Smoker     Packs/day: 0.50     Years: 38.00     Types: Cigarettes     Smokeless tobacco: Never Used     Alcohol use Yes      Comment: occasional.        Medications:      Cholecalciferol (VITAMIN D) 2000 UNITS tablet   lisinopril (PRINIVIL/ZESTRIL) 5 MG tablet   meloxicam (MOBIC) 15 MG tablet   metoprolol tartrate (LOPRESSOR) 25 MG tablet   EQ NICOTINE 14 MG/24HR 24 hr patch   EQ NICOTINE 21 MG/24HR 24 hr patch   EQ NICOTINE STEP 3 7 MG/24HR 24 hr patch   folic acid (FOLVITE) 1 MG tablet   loperamide (IMODIUM A-D) 2 MG tablet   methotrexate sodium 2.5 MG TABS   metoprolol tartrate (LOPRESSOR) 50 MG tablet   nicotine 21-14-7 MG/24HR KIT   predniSONE (DELTASONE) 5 MG tablet         Review of Systems   Constitutional: Negative for chills and fever.   HENT: Negative for congestion.    Eyes: Negative for visual disturbance.   Respiratory: Positive for chest tightness. Negative for shortness of breath.    Cardiovascular: Negative for chest pain.   Gastrointestinal: Negative for nausea and vomiting.   Genitourinary: Negative for dysuria.   Musculoskeletal: Negative for arthralgias.   Skin: Negative for rash.   Neurological: Negative for light-headedness.   Psychiatric/Behavioral: Negative for agitation.       Physical Exam   BP: 175/88  Heart Rate: 36  Temp: 97.9  F (36.6  C)  Resp: 18  Height: 165.1 cm (5' 5\")  Weight: 63.4 kg (139 lb 12.8 oz)  SpO2: 99 %      Physical Exam   Constitutional: She is oriented to person, place, and time. She appears well-developed and well-nourished. " No distress.   HENT:   Head: Normocephalic and atraumatic.   Eyes: Conjunctivae are normal.   Neck: Neck supple.   Cardiovascular: Regular rhythm.  Bradycardia present.    Pulmonary/Chest: Effort normal and breath sounds normal. She exhibits no tenderness.   Abdominal: Soft. Bowel sounds are normal.   Neurological: She is alert and oriented to person, place, and time.   Skin: Skin is warm and dry. She is not diaphoretic.   Psychiatric: She has a normal mood and affect. Her behavior is normal.   Nursing note and vitals reviewed.      ED Course     ED Course     Procedures               EKG Interpretation:      Interpreted by Isael Ruff  Time reviewed: 1030  Symptoms at time of EKG: None   Rhythm: normal sinus   Rate: 30-40  Axis: Normal  Ectopy: none  Conduction: normal  ST Segments/ T Waves: No ST-T wave changes  Q Waves: none  Comparison to prior: Unchanged    Clinical Impression: sinus bradycardia          Results for orders placed or performed during the hospital encounter of 06/28/18 (from the past 24 hour(s))   XR Chest 2 Views    Narrative    PROCEDURE:  XR CHEST 2 VW    HISTORY:  ; Chest pressure.     COMPARISON:  None.    FINDINGS:   The cardiac silhouette is normal in size. The pulmonary vasculature is  normal.  The lungs are clear. No pleural effusion or pneumothorax.      Impression    IMPRESSION:  No acute cardiopulmonary disease.      PERLITA JEFFRIES MD       Medications - No data to display    Assessments & Plan (with Medical Decision Making)     I have reviewed the nursing notes.    I have reviewed the findings, diagnosis, plan and need for follow up with the patient.  I called and spoke with Dr. Marti, cardiologist at [facility] in [place]. As the patient is asymptomatic and has had this bradycardia for quite some time he did not believe that she needed emergent pacemaker. He did think that she needed further evaluation for this. The patient is on a small dose of metoprolol, this is a quite small dose  of May not be contributing much to her bradycardia but could be a continuing factor. He wanted to monitor her heart rate for perhaps a few days with the outpatient monitor so they could better evaluate what her rhythm is. He felt that admission for observation here on telemetry and stopping the metoprolol would be reasonable. If she does well with this and has no further symptoms she could be discharged with a zio patch and follow up in clinic early next week. I have spoken with Dr. alves, hospitalist here at [facility] who will put the patient in for observation. Dr. Marti will have his office arrange follow-up in clinic. If in the meantime she develops worsening symptoms she can be transferred there at any time.    New Prescriptions    No medications on file       Final diagnoses:   Sinus bradycardia       6/28/2018   [facility]     Isael Ruff MD  06/28/18 3333

## 2018-06-28 NOTE — PHARMACY-ADMISSION MEDICATION HISTORY
Pharmacy -- Admission Medication Reconciliation    Prior to admission (PTA) medications were reviewed and the patient's PTA medication list was updated.    Sources Consulted: patient, Trang ANDREt    The reliability of this Medication Reconciliation is: Reliability: Reliable    The following significant changes were made:  Directions ADDED to folic acid and prednisone  Methotrexate dose/frequency CLARIFIED - patient recently started this medication approx. 2 weeks ago  Loperamide REMOVED  Meloxicam REMOVED  Goody's powder ADDED - Note this contains acetaminophen 325 mg, aspirin 500 mg, caffeine 65 mg    Note: Patient states she has never used her PRN metoprolol    In addition, the patient's allergies were reviewed with the patient and updated as follows:   Allergies: Review of patient's allergies indicates no known allergies.    The pharmacist has reviewed with the patient that all personal medications should be removed from the building or locked in the belongings safe.  Patient shall only take medications ordered by the physician and administered by the nursing staff.       Medication barriers identified: none   Medication adherence concerns: none   Understanding of emergency medications: patient has never needed to use her PRN metoprolol    Rosy Vasquez [facility], 6/28/2018,  3:57 PM

## 2018-06-28 NOTE — PROGRESS NOTES
Patient has been discharged at this time via w/c with nursing assistant. Zio patch is in place. Discharge paperwork sent with patient. All questions answered. Informed patient that she will receive a call from Radiology about scheduling a stress-test. Patient has been discharged at this time to go home. Zahra Wahl, RN on 6/28/2018 at 7:00 PM

## 2018-06-28 NOTE — ED NOTES
COLUMBIA-SUICIDE SEVERITY RATING SCALE   Screen with Triage Points for Emergency Department      Ask questions that are bolded and underlined.   Past  month   Ask Questions 1 and 2 YES NO   1)  Have you wished you were dead or wished you could go to sleep and not wake up?   X   2)  Have you actually had any thoughts of killing yourself?   X   If YES to 2, ask questions 3, 4, 5, and 6.  If NO to 2, go directly to question 6.   3)  Have you been thinking about how you might do this?   E.g.  I thought about taking an overdose but I never made a specific plan as to when where or how I would actually do it .and I would never go through with it.       4)  Have you had these thoughts and had some intention of acting on them?   As opposed to  I have the thoughts but I definitely will not do anything about them.       5)  Have you started to work out or worked out the details of how to kill yourself? Do you intend to carry out this plan?      6)  Have you ever done anything, started to do anything, or prepared to do anything to end your life?  Examples: Collected pills, obtained a gun, gave away valuables, wrote a will or suicide note, took out pills but didn t swallow any, held a gun but changed your mind or it was grabbed from your hand, went to the roof but didn t jump; or actually took pills, tried to shoot yourself, cut yourself, tried to hang yourself, etc.    If YES, ask: Was this within the past three months?  Lifetime     X    Past 3 Months        Item 1:  Behavioral Health Referral at Discharge  Item 2:  Behavioral Health Referral at Discharge   Item 3:  Behavioral Health Consult (Psychiatric Nurse/) and consider Patient Safety Precautions  Item 4:  Immediate Notification of Physician and/or Behavioral Health and Patient Safety Precautions   Item 5:  Immediate Notification of Physician and/or Behavioral Health and Patient Safety Precautions  Item 6:  Over 3 months ago: Behavioral Health Consult  (Psychiatric Nurse/) and consider Patient Safety Precautions  OR  Item 6:  3 months ago or less: Immediate Notification of Physician and/or Behavioral Health and Patient Safety Precautions

## 2018-06-28 NOTE — IP AVS SNAPSHOT
MRN:[id number]                      After Visit Summary   6/28/2018    Isabel Talbot    MRN: [id number]           Thank you!     Thank you for choosing [facility] for your care. Our goal is always to provide you with excellent care. Hearing back from our patients is one way we can continue to improve our services. Please take a few minutes to complete the written survey that you may receive in the mail after you visit with us. Thank you!        Patient Information     Date Of Birth          1965        Designated Caregiver       Most Recent Value    Caregiver    Will someone help with your care after discharge? yes    Name of designated caregiver Stuart cummings    Phone number of caregiver [phone]    Caregiver address [place]      About your hospital stay     You were admitted on:  June 28, 2018 You last received care in the:  [facility]    You were discharged on:  June 28, 2018        Reason for your hospital stay       Chest pain and low heart rate                  Who to Call     For medical emergencies, please call 911.  For non-urgent questions about your medical care, please call your primary care provider or clinic, [phone]          Attending Provider     Provider Specialty    Isael Ruff MD Emergency Medicine    [facility], Placido LANDRUM MD Internal Medicine       Primary Care Provider Office Phone # Fax #    Bakari Guillen [phone]       When to contact your care team       Call your primary doctor if you have any of the following: temperature greater than 101,  increased shortness of breath, chest pain, irregular heart beats or any symptoms of lightheadedness or episodes of fainting.                  After Care Instructions     Activity       Your activity upon discharge: activity as tolerated            Diet       Follow this diet upon discharge: Orders Placed This Encounter      Combination Diet Regular Diet Adult; No Caffeine  "Diet            Discharge Instructions       - stop taking metoprolol 25mg daily  - increase your lisinopril to 10mg daily to manage your blood pressure  - there are no other changes to your medications at this time  - if you take a metoprolol for fast heart rate you should call your provider or go to the ER to make sure it doesn't slow your heart rate down too far.                  Follow-up Appointments     Follow-up and recommended labs and tests        - Follow up with the [facility] heart doctors as scheduled  - Wear your Zio patch for 2 weeks to further evaluate for irregular heart beats  - have a stress test completed of your heart                  Future tests that were ordered for you     NM Lexiscan stress test           Zio Patch Holter                 Pending Results     No orders found from 6/26/2018 to 6/29/2018.            Statement of Approval     Ordered          06/28/18 5604  I have reviewed and agree with all the recommendations and orders detailed in this document.  EFFECTIVE NOW     Approved and electronically signed by:  Placido Carty MD             Admission Information     Date & Time Provider Department Dept. Phone    6/28/2018 Placido Carty MD [facility] [phone]      Your Vitals Were     Blood Pressure Temperature Respirations Height Weight Pulse Oximetry    128/67 96.4  F (35.8  C) (Tympanic) 18 1.651 m (5' 5\") 61.2 kg (134 lb 14.7 oz) 98%    BMI (Body Mass Index)                   22.45 kg/m2           Care EveryWhere ID     This is your Care EveryWhere ID. This could be used by other organizations to access your [facility] medical records  [id number]        Equal Access to Services     JERAD CARLSON : Hadii joce Donaldson, waemanida elisabet, qaybta kaalmada adeonealyatravis, dionne avina. So [facility] [phone].    ATENCIÓN: Si habla español, tiene a golden disposición servicios gratuitos de asistencia lingüística. Llame al " [phone].    We comply with applicable federal civil rights laws and [place] laws. We do not discriminate on the basis of race, color, national origin, age, disability, sex, sexual orientation, or gender identity.               Review of your medicines      CONTINUE these medicines which may have CHANGED, or have new prescriptions. If we are uncertain of the size of tablets/capsules you have at home, strength may be listed as something that might have changed.        Dose / Directions    lisinopril 5 MG tablet   Commonly known as:  PRINIVIL/ZESTRIL   This may have changed:  how much to take   Used for:  Benign essential hypertension        Dose:  10 mg   Take 2 tablets (10 mg) by mouth daily   Quantity:  90 tablet   Refills:  3       metoprolol tartrate 50 MG tablet   Commonly known as:  LOPRESSOR   This may have changed:  Another medication with the same name was removed. Continue taking this medication, and follow the directions you see here.   Used for:  Paroxysmal atrial fibrillation (H)        Take 1 tab PO every 2 hours prn racing heart   Quantity:  60 tablet   Refills:  1         CONTINUE these medicines which have NOT CHANGED        Dose / Directions    cholecalciferol 1000 units Tabs        Take 1000 units by mouth alternating with 2000 units every other day   Refills:  0       folic acid 1 MG tablet   Commonly known as:  FOLVITE        Dose:  1 mg   Take 1 mg by mouth daily   Refills:  0       GOODYS EXTRA STRENGTH 500-325-65 MG Pack   Indication:  Headache   Generic drug:  Aspirin-Acetaminophen-Caffeine        Dose:  1 packet   Take 1 packet by mouth 2 times daily as needed   Refills:  0       methotrexate sodium 2.5 MG Tabs        Dose:  20 mg   20 mg by Oral or Feeding Tube route once a week Take on Fridays   Refills:  0       nicotine 21-14-7 MG/24HR Kit   Used for:  Cigarette nicotine dependence without complication        Dose:  1 patch   Place 1 patch onto the skin daily Use 21 mg patch qd for  6 weeks, then 14 mg x 2 weeks, then 7 mg x 2 weeks   Quantity:  70 kit   Refills:  0       predniSONE 5 MG tablet   Commonly known as:  DELTASONE        Dose:  5 mg   Take 5 mg by mouth daily   Refills:  0            Where to get your medicines      These medications were sent to [facility] [address] [address]     Phone:  [phone]     lisinopril 5 MG tablet                Protect others around you: Learn how to safely use, store and throw away your medicines at www.disposemymeds.org.             Medication List: This is a list of all your medications and when to take them. Check marks below indicate your daily home schedule. Keep this list as a reference.      Medications           Morning Afternoon Evening Bedtime As Needed    cholecalciferol 1000 units Tabs   Take 1000 units by mouth alternating with 2000 units every other day                                folic acid 1 MG tablet   Commonly known as:  FOLVITE   Take 1 mg by mouth daily                                GOODYS EXTRA STRENGTH 500-325-65 MG Pack   Take 1 packet by mouth 2 times daily as needed   Generic drug:  Aspirin-Acetaminophen-Caffeine                                lisinopril 5 MG tablet   Commonly known as:  PRINIVIL/ZESTRIL   Take 2 tablets (10 mg) by mouth daily                                methotrexate sodium 2.5 MG Tabs   20 mg by Oral or Feeding Tube route once a week Take on Fridays                                metoprolol tartrate 50 MG tablet   Commonly known as:  LOPRESSOR   Take 1 tab PO every 2 hours prn racing heart                                nicotine 21-14-7 MG/24HR Kit   Place 1 patch onto the skin daily Use 21 mg patch qd for 6 weeks, then 14 mg x 2 weeks, then 7 mg x 2 weeks                                predniSONE 5 MG tablet   Commonly known as:  DELTASONE   Take 5 mg by mouth daily

## 2018-06-28 NOTE — PROGRESS NOTES
" NS ADMISSION NOTE    Patient admitted to room 334 at approximately 1250 via cart from emergency room. Patient was accompanied by significant other.     Verbal SBAR report received from Elissa prior to patient arrival.     Patient ambulated to bed with stand-by assist. Patient alert and oriented X 3. The patient is not having any pain. 0-10 Pain Scale: 0. Admission vital signs: Blood pressure 151/86, temperature 97.5  F (36.4  C), temperature source Tympanic, resp. rate 16, height 1.651 m (5' 5\"), weight 61.2 kg (134 lb 14.7 oz), SpO2 99 %, not currently breastfeeding. Patient and significant other were oriented to plan of care, call light, bed controls, tv, telephone, bathroom and visiting hours.     Risk Assessment    The following safety risks were identified during admission: fall. Yellow risk band applied: YES.     Mayra Ordaz    "

## 2018-06-28 NOTE — LETTER
Isabel Talbot  [address] [place] [id number]    6/29/2018      Dear Ms. Talbot,      We've received the results back from the laboratory for the samples you gave in clinic.  Your labs are normal. Please contact us at [phone] with any questions or concerns that you have.    I attached your lab results for your records.        Take Care,         Leela Carrion PA-C    Resulted Orders   CBC and Differential   Result Value Ref Range    WBC 5.9 4.0 - 11.0 10e9/L    RBC Count 3.87 3.8 - 5.2 10e12/L    Hemoglobin 12.3 11.7 - 15.7 g/dL    Hematocrit 35.9 35.0 - 47.0 %    MCV 93 78 - 100 fl    MCH 31.8 26.5 - 33.0 pg    MCHC 34.3 31.5 - 36.5 g/dL    RDW 16.0 (H) 10.0 - 15.0 %    Platelet Count 219 150 - 450 10e9/L    Diff Method Automated Method     % Neutrophils 46.8 %    % Lymphocytes 39.5 %    % Monocytes 8.5 %    % Eosinophils 3.9 %    % Basophils 1.0 %    % Immature Granulocytes 0.3 %    Absolute Neutrophil 2.8 1.6 - 8.3 10e9/L    Absolute Lymphocytes 2.3 0.8 - 5.3 10e9/L    Absolute Monocytes 0.5 0.0 - 1.3 10e9/L    Absolute Eosinophils 0.2 0.0 - 0.7 10e9/L    Absolute Basophils 0.1 0.0 - 0.2 10e9/L    Abs Immature Granulocytes 0.0 0 - 0.4 10e9/L   Comprehensive metabolic panel   Result Value Ref Range    Sodium 140 134 - 144 mmol/L    Potassium 3.6 3.5 - 5.1 mmol/L    Chloride 108 (H) 98 - 107 mmol/L    Carbon Dioxide 25 21 - 31 mmol/L    Anion Gap 7 3 - 14 mmol/L    Glucose 92 70 - 105 mg/dL    Urea Nitrogen 18 7 - 25 mg/dL    Creatinine 0.69 0.60 - 1.20 mg/dL    GFR Estimate 89 >60 mL/min/1.7m2    GFR Estimate If Black >90 >60 mL/min/1.7m2    Calcium 9.4 8.6 - 10.3 mg/dL    Bilirubin Total 0.4 0.3 - 1.0 mg/dL    Albumin 3.9 3.5 - 5.7 g/dL    Protein Total 6.5 6.4 - 8.9 g/dL    Alkaline Phosphatase 50 34 - 104 U/L    ALT 40 7 - 52 U/L    AST 25 13 - 39 U/L   Troponin I   Result Value Ref Range    Troponin I ES <0.030 0.000 - 0.034 ug/L   D-Dimer,Quantitative   Result Value Ref Range    D-Dimer  ng/mL <200 0 - 230 ng/ml D-DU

## 2018-06-28 NOTE — H&P
[facility] And Discharge Summary    History and Physical  Hospitalist       Date of Admission:  6/28/2018    Assessment & Plan   Isabel Talbot is a 53 year old female who presents with chest pain bradycardia      Chest pain    Assessment: Atypical symptoms, no EKG changes or troponin elevation concerning for ACS at this point.  Given her risk factors of hypertension and tobacco abuse she will benefit from more definitive risk stratification as an outpatient.  Patient had further repeat negative troponins and insisted on being discharged prior to final third troponin around midnight.  Given the time frame of her symptoms and persistently negative troponins and patient feeling at baseline this was felt reasonable.  She will need a stress test as an outpatient and this is been ordered with follow-up by cardiology at [facility].        Bradycardia    Assessment: Stable and sinus during her stay.  She had intermittent infrequent PVCs but no evidence of A. fib during the course of her stay this afternoon, heart rates remained in the upper 30s-40s in comparison to prior this is been very chronic for her.  She remained asymptomatic, she also had a repeat EKG done during episodes on telemetry that look concerning for possible high degree AV block but EKG did not correlate the telemetry findings.  Going forward she will stop taking her scheduled metoprolol daily to improve her resting heart rate, she had a TTE completed with normal wall motion and normal valvular function, ZIO patch was placed at time of discharge to further quantify possible atrial fibrillation, again with cardiology follow-up as an outpatient, and anticoagulation consideration given her [id number] of 2 for HTN and gender.       Benign essential hypertension: Well-controlled, given the discontinuation of her metoprolol she can increase her lisinopril from 5->10 mg daily.      Rheumatoid arthritis, involving unspecified site,  unspecified rheumatoid factor presence (H)    Assessment: Chronic suboptimally controlled pain, no evidence of acute joint flare up, no changes were made to her medications and she will follow-up with rheumatology as rescheduled to consider Biologics pending clinical course    # Pain Assessment:  Current Pain Score 6/28/2018   Patient currently in pain? denies   Pain score (0-10) 0   Pain location -   Pain descriptors -   Isabel walter pain level was assessed and she currently denies pain.      DVT Prophylaxis: Low Risk/Ambulatory with no VTE prophylaxis indicated  Code Status: Full Code    Placido Carty    Primary Care Physician   Bakari Guillen    Chief Complaint   Chest pain    History is obtained from the patient and chart review.    History of Present Illness   Isabel Talbot is a 53 year old female who presents with chest pain and sinus bradycardia.  History of chronic bradycardia and is maintained maintained on metoprolol for blood pressure control, she also uses metoprolol as needed for palpitations due to a history of chronic paroxysmal atrial fibrillation.  On 6/7 she seen by rheumatology and had her methotrexate dose increased to 20 mg weekly as well as prednisone.    5 days ago she had a 30 minute episode of nonradiating epigastric/substernal chest pressure at work.  This was associated with severe persistent lightheadedness without syncope.  She went home laid down and when she woke up the next day the symptoms had resolved.  She is otherwise able to walk at least 2 flights of stairs or the length of the football field without limitations by cardiopulmonary symptoms.    He presented to clinic today for follow-up of these symptoms, was noted to have a bradycardia in the 30s, was sent to the emergency department, there had unremarkable lab work and evaluation and subsequent placed on observation after discussion with cardiology.    Past Medical History    I have reviewed this patient's medical history and updated  it with pertinent information if needed.   Past Medical History:   Diagnosis Date     Benign essential hypertension 3/13/2018     Encounter for tobacco use cessation counseling 3/13/2018     Hypovitaminosis D 3/1/2018     Paroxysmal atrial fibrillation (H) 3/1/2018     Rheumatoid arthritis, involving unspecified site, unspecified rheumatoid factor presence (H) 3/1/2018       Past Surgical History   I have reviewed this patient's surgical history and updated it with pertinent information if needed.  Past Surgical History:   Procedure Laterality Date     COLONOSCOPY N/A 4/2/2018    F/U 2028     HAND SURGERY      s/p fall - age 19 or 20       Prior to Admission Medications   Prior to Admission Medications   Prescriptions Last Dose Informant Patient Reported? Taking?   Cholecalciferol (VITAMIN D) 2000 UNITS tablet 6/28/2018 at Unknown time  Yes Yes   Sig: Take 2,000 Units by mouth daily   EQ NICOTINE 14 MG/24HR 24 hr patch Unknown at Unknown time  Yes No   EQ NICOTINE 21 MG/24HR 24 hr patch Unknown at Unknown time  Yes No   EQ NICOTINE STEP 3 7 MG/24HR 24 hr patch Unknown at Unknown time  Yes No   folic acid (FOLVITE) 1 MG tablet Unknown at Unknown time  Yes No   lisinopril (PRINIVIL/ZESTRIL) 5 MG tablet 6/28/2018 at Unknown time  No Yes   Sig: Take 1 tablet (5 mg) by mouth daily   loperamide (IMODIUM A-D) 2 MG tablet Unknown at Unknown time  No No   Sig: Take 2 tabs (4 mg) after first loose stool, and then take one tab (2 mg) after each diarrheal stool.  Max of 8 tabs (16 mg) per day.   meloxicam (MOBIC) 15 MG tablet 6/28/2018 at Unknown time  No Yes   Sig: Take 1 tablet (15 mg) by mouth daily   methotrexate sodium 2.5 MG TABS Unknown at Unknown time  Yes No   metoprolol tartrate (LOPRESSOR) 25 MG tablet 6/28/2018 at Unknown time  No Yes   Sig: Take 1 tablet (25 mg) by mouth daily   metoprolol tartrate (LOPRESSOR) 50 MG tablet Unknown at Unknown time  No No   Sig: Take 1 tab PO every 2 hours prn racing heart   nicotine  21-14-7 MG/24HR KIT Unknown at Unknown time  No No   Sig: Place 1 patch onto the skin daily Use 21 mg patch qd for 6 weeks, then 14 mg x 2 weeks, then 7 mg x 2 weeks   predniSONE (DELTASONE) 5 MG tablet Unknown at Unknown time  Yes No   Sig: Take 5 mg by mouth      Facility-Administered Medications: None     Allergies   No Known Allergies    Social History   I have reviewed this patient's social history and updated it with pertinent information if needed. Isabel Talbot  reports that she has been smoking Cigarettes.  She has a 19.00 pack-year smoking history. She has never used smokeless tobacco. She reports that she drinks alcohol. She reports that she does not use illicit drugs.    Family History   I have reviewed this patient's family history and updated it with pertinent information if needed.   Family History   Problem Relation Age of Onset     Cancer Mother      throat, tonsillar cancer     HEART DISEASE Father      Hypertension Father      Hyperlipidemia Father        Review of Systems   The 10 point Review of Systems is negative other than noted in the HPI or here.     Physical Exam   Temp: 97.5  F (36.4  C) Temp src: Tympanic BP: 151/86   Heart Rate: 40 Resp: 16 SpO2: 99 % O2 Device: None (Room air)    Vital Signs with Ranges  Temp:  [97.5  F (36.4  C)-97.9  F (36.6  C)] 97.5  F (36.4  C)  Pulse:  [37-40] 37  Heart Rate:  [33-40] 40  Resp:  [13-18] 16  BP: (128-175)/() 151/86  SpO2:  [97 %-99 %] 99 %  134 lbs 14.74 oz    Exam:  GENERAL: Talkative, in no apparent distress.  Head: normocephalic and atraumatic  Eyes: anicteric and non-injected sclera  Nose: no rhinorrhea or epistaxis.   Throat: moist mucous membranes with no active oral lesions. Poor dentition  NECK: Supple, jugular venous distension not present.  CARDIOVASCULAR: Tachycardic rate and regular rhythm, 2 out of 6 mid systolic murmur without radiation, no rubs, or gallops. Normal S1/S2. No lower extremity edema.   RESPIRATORY: clear to  auscultation bilaterally, no wheezes, no crackles.   GI: soft, non-tender, non-distended, normoactive bowel sounds.  MUSCULOSKELETAL: warm and well perfused, 2+ dorsalis pedis pulses.    SKIN: no pallor, jaundice or rashes.  NEUROLOGY: AAOx3, follows commands, speech and language without focal deficits.      Data   Data reviewed today:  I personally reviewed the EKG tracing showing Sinus bradycardia, normal axis, no consistent ST-T wave inversions elevations or depressions.  In comparison to prior heart rate is not significantly changed.    Recent Labs  Lab 06/28/18  0905   WBC 5.9   HGB 12.3   MCV 93         POTASSIUM 3.6   CHLORIDE 108*   CO2 25   BUN 18   CR 0.69   ANIONGAP 7   OTILIA 9.4   GLC 92   ALBUMIN 3.9   PROTTOTAL 6.5   BILITOTAL 0.4   ALKPHOS 50   ALT 40   AST 25   TROPI <0.030       Recent Results (from the past 24 hour(s))   XR Chest 2 Views    Narrative    PROCEDURE:  XR CHEST 2 VW    HISTORY:  ; Chest pressure.     COMPARISON:  None.    FINDINGS:   The cardiac silhouette is normal in size. The pulmonary vasculature is  normal.  The lungs are clear. No pleural effusion or pneumothorax.      Impression    IMPRESSION:  No acute cardiopulmonary disease.      PERLITA JEFFRIES MD

## 2018-06-28 NOTE — ED TRIAGE NOTES
"Patient was seen in clinic today for chest tightness that she was having last Saturday.  EKG done in clinic by Tiara Carrion, patient HR 38.   Patient denies feeling SOB, no chest pain, nausea.  Patient stated \" I feel tired sometimes.\"  Per clinic LPN, Dr. Calix recommended patient be sent to [facility].    "

## 2018-06-28 NOTE — MR AVS SNAPSHOT
After Visit Summary   6/28/2018    Isabel Talbot    MRN: [id number]           Patient Information     Date Of Birth          1965        Visit Information        Provider Department      6/28/2018 8:30 AM Leela Carrion PA-C [facility]        Today's Diagnoses     Sinus bradycardia    -  1    Chest pressure        Elevated blood pressure reading in office with diagnosis of hypertension           Follow-ups after your visit        Follow-up notes from your care team     Return if symptoms worsen or fail to improve.      Your next 10 appointments already scheduled     Jul 03, 2018  7:00 AM CDT   NM MPI WITH CR with  Cr Nurse, NM1,  STRESS ROOM 1   [facility] ([facility])    [address]  [place] [address] [phone]           For a ONE day exam: Allow 3-4 hours for test. For a TWO day exam: Allow  minutes PER day for test.  On the day of your resting scan: Please stop eating 3 hours before the test. You may drink water or juice.  On the day of your stress test: Stop all caffeine 12 hours before the test. This includes coffee, tea, soda pop, chocolate and certain medicines (such as Anacin, Excedrin and NoDoz). Also avoid decaf coffee and tea, as these contain small amounts of caffeine.  Stop eating 3 hours before the test. You may drink water.  You may need to stop some medicines before the test. Follow your doctor s orders. - If you take a beta blocker: Do not take your beta-blocker on the day before your test, unless specifically told to by your doctor. And do not take it on the day of your test. Bring it with you to take after the test. - If you take Aggrenox or dipyridamole (Persantine, Permole), stop taking it 48 hours before your test. - If you take Viagra, Cialis or Levitra, stop taking it 48 hours before your test. - If you take theophylline or aminophylline, stop taking it 12 hours  "before your test.  Do not take nitrates on the day of your test. Do not wear your Nitro-Patch.  Please wear a loose two-piece outfit. If you will have an exercise test, bring rubber-soled walking shoes.  When you arrive, please tell us if you have a pacemaker or ICD (implantable defibrillator).  Please call your Imaging Department at your exam site with any questions.            Jul 03, 2018  8:30 AM CDT   Ekg Stress Nm Lexiscan with  Cr Nurse, Joshua Becerra MD, [facility],  STRESS ROOM 1   [facility] [place] [facility] ([facility])    [address]  [place] [id number] [phone]              Future tests that were ordered for you today     Open Future Orders        Priority Expected Expires Ordered    NM Lexiscan stress test Routine  6/28/2019 6/28/2018    Zio Patch Holter Routine  8/12/2018 6/28/2018    XR Chest 2 Views Routine 6/28/2018 6/28/2019 6/28/2018            Who to contact     If you have questions or need follow up information about today's clinic visit or your schedule please contact [facility] directly at [phone].  Normal or non-critical lab and imaging results will be communicated to you by MyChart, letter or phone within 4 business days after the clinic has received the results. If you do not hear from us within 7 days, please contact the clinic through MyChart or phone. If you have a critical or abnormal lab result, we will notify you by phone as soon as possible.  Submit refill requests through [employer] or call your pharmacy and they will forward the refill request to us. Please allow 3 business days for your refill to be completed.          Additional Information About Your Visit        Care EveryWhere ID     This is your Care EveryWhere ID. This could be used by other organizations to access your [facility] medical records  [id number]        Your Vitals Were     Pulse Height BMI (Body Mass Index)             37 5' 5\" (1.651 m) " 22.7 kg/m2          Blood Pressure from Last 3 Encounters:   06/28/18 128/67   06/28/18 167/84   04/09/18 128/88    Weight from Last 3 Encounters:   06/28/18 134 lb 14.7 oz (61.2 kg)   06/28/18 136 lb 6.4 oz (61.9 kg)   04/09/18 137 lb 3.2 oz (62.2 kg)              We Performed the Following     CBC and Differential     Comprehensive metabolic panel     D-Dimer,Quantitative     EKG 12-lead complete w/read - Clinics     Troponin I        Primary Care Provider Office Phone # Fax #    Bakari Guillen [phone] [address]  [facility] [address]        Equal Access to Services     JERAD CARLSON : Hadii joce Donaldson, waaxda luqadaha, qaybta kaalmada kendrayatravis, dionne sabillon . [facility] [phone].    ATENCIÓN: Si habla español, tiene a golden disposición servicios gratuitos de asistencia lingüística. Llame al [phone].    We comply with applicable federal civil rights laws and [place] laws. We do not discriminate on the basis of race, color, national origin, age, disability, sex, sexual orientation, or gender identity.            Thank you!     Thank you for choosing [facility]  for your care. Our goal is always to provide you with excellent care. Hearing back from our patients is one way we can continue to improve our services. Please take a few minutes to complete the written survey that you may receive in the mail after your visit with us. Thank you!             Your Updated Medication List - Protect others around you: Learn how to safely use, store and throw away your medicines at www.disposemymeds.org.          This list is accurate as of 6/28/18 10:25 AM.  Always use your most recent med list.                   Brand Name Dispense Instructions for use Diagnosis    folic acid 1 MG tablet    FOLVITE     Take 1 mg by mouth daily        lisinopril 5 MG tablet    PRINIVIL/ZESTRIL    90 tablet    Take 1 tablet (5 mg) by mouth  daily    Benign essential hypertension       methotrexate sodium 2.5 MG Tabs      20 mg by Oral or Feeding Tube route once a week Take on Fridays        * metoprolol tartrate 25 MG tablet    LOPRESSOR    90 tablet    Take 1 tablet (25 mg) by mouth daily    Paroxysmal atrial fibrillation (H)       * metoprolol tartrate 50 MG tablet    LOPRESSOR    60 tablet    Take 1 tab PO every 2 hours prn racing heart    Paroxysmal atrial fibrillation (H)       nicotine 21-14-7 MG/24HR Kit     70 kit    Place 1 patch onto the skin daily Use 21 mg patch qd for 6 weeks, then 14 mg x 2 weeks, then 7 mg x 2 weeks    Cigarette nicotine dependence without complication       predniSONE 5 MG tablet    DELTASONE     Take 5 mg by mouth daily        * Notice:  This list has 2 medication(s) that are the same as other medications prescribed for you. Read the directions carefully, and ask your doctor or other care provider to review them with you.

## 2018-06-29 ENCOUNTER — TELEPHONE (OUTPATIENT)
Dept: CARDIAC REHAB | Facility: OTHER | Age: 53
End: 2018-06-29

## 2018-06-29 ASSESSMENT — PATIENT HEALTH QUESTIONNAIRE - PHQ9: SUM OF ALL RESPONSES TO PHQ QUESTIONS 1-9: 2

## 2018-06-29 ASSESSMENT — ANXIETY QUESTIONNAIRES: GAD7 TOTAL SCORE: 6

## 2018-06-29 NOTE — TELEPHONE ENCOUNTER
"Called to remind patient of stress test and to review instructions. Instructed to hold caffeine, including \"Goody's pill\" with caffeine, Take other medications unless they bother her stomach, then bring them to take with food at 0915. Told not to eat breakfast, and to check in at diagnostics. Patient verbalized understanding.  "

## 2018-07-03 ENCOUNTER — HOSPITAL ENCOUNTER (OUTPATIENT)
Dept: NUCLEAR MEDICINE | Facility: OTHER | Age: 53
Discharge: HOME | End: 2018-07-03
Attending: INTERNAL MEDICINE | Admitting: INTERNAL MEDICINE
Payer: COMMERCIAL

## 2018-07-03 ENCOUNTER — HOSPITAL ENCOUNTER (OUTPATIENT)
Dept: NUCLEAR MEDICINE | Facility: OTHER | Age: 53
End: 2018-07-03
Attending: INTERNAL MEDICINE
Payer: COMMERCIAL

## 2018-07-03 VITALS — WEIGHT: 134 LBS | HEIGHT: 65 IN | BODY MASS INDEX: 22.33 KG/M2

## 2018-07-03 DIAGNOSIS — R00.1 SINUS BRADYCARDIA: ICD-10-CM

## 2018-07-03 DIAGNOSIS — R07.9 CHEST PAIN, UNSPECIFIED TYPE: ICD-10-CM

## 2018-07-03 PROCEDURE — A9500 TC99M SESTAMIBI: HCPCS | Performed by: INTERNAL MEDICINE

## 2018-07-03 PROCEDURE — 25000128 H RX IP 250 OP 636: Performed by: INTERNAL MEDICINE

## 2018-07-03 PROCEDURE — 93017 CV STRESS TEST TRACING ONLY: CPT

## 2018-07-03 PROCEDURE — 93016 CV STRESS TEST SUPVJ ONLY: CPT | Performed by: INTERNAL MEDICINE

## 2018-07-03 PROCEDURE — 78452 HT MUSCLE IMAGE SPECT MULT: CPT

## 2018-07-03 PROCEDURE — 93018 CV STRESS TEST I&R ONLY: CPT | Performed by: INTERNAL MEDICINE

## 2018-07-03 PROCEDURE — 34300033 ZZH RX 343: Performed by: INTERNAL MEDICINE

## 2018-07-03 RX ORDER — AMINOPHYLLINE 25 MG/ML
50 INJECTION, SOLUTION INTRAVENOUS
Status: DISCONTINUED | OUTPATIENT
Start: 2018-07-03 | End: 2018-07-04 | Stop reason: HOSPADM

## 2018-07-03 RX ORDER — REGADENOSON 0.08 MG/ML
0.4 INJECTION, SOLUTION INTRAVENOUS ONCE
Status: COMPLETED | OUTPATIENT
Start: 2018-07-03 | End: 2018-07-03

## 2018-07-03 RX ADMIN — KIT FOR THE PREPARATION OF TECHNETIUM TC99M SESTAMIBI 8.92 MCI.: 1 INJECTION, POWDER, LYOPHILIZED, FOR SOLUTION PARENTERAL at 07:20

## 2018-07-03 RX ADMIN — REGADENOSON 0.4 MG: 0.08 INJECTION, SOLUTION INTRAVENOUS at 08:59

## 2018-07-03 RX ADMIN — KIT FOR THE PREPARATION OF TECHNETIUM TC99M SESTAMIBI 32.3 MCI.: 1 INJECTION, POWDER, LYOPHILIZED, FOR SOLUTION PARENTERAL at 09:00

## 2018-07-03 NOTE — PROGRESS NOTES
0710The patient arrived for a Lexiscan Cardiolite stress test.  The procedure, risks, and benefits were discussed with the patient significant,and the consent was signed.  A saline lock was started,and the Cardiolite was injected by x-ray.  The patient was taken to the waiting area, to await resting images at 0725*.  0839 The patient returned from x-ray and was prepped for the stress test.   Dr. Becerra arrived, and the patient was administered the Lexiscan per procedure.  The patient tolerated the procedure well.  She was given a snack and taken to x-ray in stable condition for stress images.  The saline lock will be removed by x-ray for proper disposal.  Please see the chart for the complete test results.

## 2018-07-05 ENCOUNTER — TELEPHONE (OUTPATIENT)
Dept: INTERNAL MEDICINE | Facility: OTHER | Age: 53
End: 2018-07-05

## 2018-07-05 DIAGNOSIS — I10 BENIGN ESSENTIAL HYPERTENSION: Primary | ICD-10-CM

## 2018-07-05 RX ORDER — LOSARTAN POTASSIUM 50 MG/1
50 TABLET ORAL DAILY
Qty: 90 TABLET | Refills: 3 | Status: SHIPPED | OUTPATIENT
Start: 2018-07-05 | End: 2018-07-17

## 2018-07-05 NOTE — TELEPHONE ENCOUNTER
The patient was contacted and given the information below. She also reports she would like to cancel her appointment today at 1:40pm.  Tejal Wilcox LPN on 7/5/2018 at 11:35 AM

## 2018-07-05 NOTE — TELEPHONE ENCOUNTER
1. Benign essential hypertension  START:   - losartan (COZAAR) 50 MG tablet; Take 1 tablet (50 mg) by mouth daily Start 7/7/2018     --- If there are cheaper angiotensin receptor blockers, other than losartan, that would be fine.  There are a number of them.  I do not know which one is cheaper.    --- Stop Lisinopril 7/5/2018 due to cough ---    Bakari Guillen MD

## 2018-07-05 NOTE — TELEPHONE ENCOUNTER
Patient notified of Result note.    Patient reports that she has been having a dry cough all day long since her Lisinopril was double on 6-28-18.  Patient is wondering what she should do.      Mallory Santana LPN 7/5/2018 9:28 AM

## 2018-07-13 ENCOUNTER — TRANSFERRED RECORDS (OUTPATIENT)
Dept: HEALTH INFORMATION MANAGEMENT | Facility: OTHER | Age: 53
End: 2018-07-13

## 2018-07-16 ENCOUNTER — TELEPHONE (OUTPATIENT)
Dept: INTERNAL MEDICINE | Facility: OTHER | Age: 53
End: 2018-07-16

## 2018-07-16 NOTE — TELEPHONE ENCOUNTER
Patient was recently started on losartan and is now having blisters on palms and soles of feet and arms.  Please call to discuss and advise. Maddie Ha on 7/16/2018 at 9:16 AM

## 2018-07-17 NOTE — TELEPHONE ENCOUNTER
Noted. Stop losartan. Put as allergy.     Take some benadryl.  Follow box instructions.    Please update her methotrexate dose on her medication list.    Bakari Guillen MD

## 2018-07-17 NOTE — TELEPHONE ENCOUNTER
Patient states after starting Losartan 50mg on 07/07/18 she has broke out on palms of hands and bottom of feet.  Also, she reports increase to Methotrexate 2.5mg on Friday 07/06/17.  She is now taking ten (10) tablets daily.  Will contact [facility] pharmacy when they open today for confirmation of Methotrexate dose.    Nicolasa Hart LPN............7/17/2018 8:21 AM

## 2018-07-17 NOTE — TELEPHONE ENCOUNTER
Patient will monitor blood pressure twice daily and call on Friday with readings since she is no longer on blood pressure medication.     The Methotrexate 2.5mg medication doseage was changed on 06/29/18 by Dr. Hagen with [facility].  She is taking 10 tablets of 2.5mg for total of 25mg weekly on Fridays.  [facility] pharmacy confirmed and chart updated.    [facility] pharmacist Leslie notified of Losartan discontinued for allergic reaction and will update their records.    Nicolasa Hart LPN............7/17/2018 10:09 AM

## 2018-08-08 ENCOUNTER — OFFICE VISIT (OUTPATIENT)
Dept: FAMILY MEDICINE | Facility: OTHER | Age: 53
End: 2018-08-08
Attending: PHYSICIAN ASSISTANT
Payer: COMMERCIAL

## 2018-08-08 VITALS
TEMPERATURE: 96.7 F | SYSTOLIC BLOOD PRESSURE: 172 MMHG | HEART RATE: 52 BPM | WEIGHT: 137.4 LBS | BODY MASS INDEX: 22.86 KG/M2 | DIASTOLIC BLOOD PRESSURE: 86 MMHG

## 2018-08-08 DIAGNOSIS — B35.3 TINEA PEDIS OF BOTH FEET: ICD-10-CM

## 2018-08-08 DIAGNOSIS — L30.9 DERMATITIS: ICD-10-CM

## 2018-08-08 DIAGNOSIS — I10 BENIGN ESSENTIAL HYPERTENSION: ICD-10-CM

## 2018-08-08 DIAGNOSIS — Z72.0 TOBACCO ABUSE: Primary | ICD-10-CM

## 2018-08-08 PROCEDURE — 99214 OFFICE O/P EST MOD 30 MIN: CPT | Performed by: PHYSICIAN ASSISTANT

## 2018-08-08 PROCEDURE — G0463 HOSPITAL OUTPT CLINIC VISIT: HCPCS

## 2018-08-08 RX ORDER — CLOTRIMAZOLE 1 %
CREAM (GRAM) TOPICAL 2 TIMES DAILY
Qty: 30 G | Refills: 1 | Status: ON HOLD | OUTPATIENT
Start: 2018-08-08 | End: 2018-11-29

## 2018-08-08 RX ORDER — LOSARTAN POTASSIUM 50 MG/1
50 TABLET ORAL DAILY
Qty: 90 TABLET | Refills: 3 | Status: SHIPPED | OUTPATIENT
Start: 2018-08-08 | End: 2019-07-17

## 2018-08-08 RX ORDER — TRIAMCINOLONE ACETONIDE 1 MG/G
OINTMENT TOPICAL
Qty: 30 G | Refills: 1 | Status: ON HOLD | OUTPATIENT
Start: 2018-08-08 | End: 2018-11-29

## 2018-08-08 RX ORDER — VARENICLINE TARTRATE 1 MG/1
1 TABLET, FILM COATED ORAL 2 TIMES DAILY
Qty: 60 TABLET | Refills: 2 | Status: SHIPPED | OUTPATIENT
Start: 2018-08-08 | End: 2018-12-18

## 2018-08-08 ASSESSMENT — PAIN SCALES - GENERAL: PAINLEVEL: SEVERE PAIN (6)

## 2018-08-08 NOTE — PATIENT INSTRUCTIONS
Blood pressure is elevated today. Encouraged to monitor blood pressure a couple times a week over the next few weeks. Return in 1-2 weeks for a recheck appointment. Work on diet and exercise to decrease blood pressure. Weight loss is helpful.  Decrease salt intake.      Started on chantix for smoking cessation.         Athlete s Foot    Athlete s foot (tinea pedis) is caused by a fungal infection in the skin. It affects the skin between the toes, causing cracks in the skin called fissures. It can also affect the bottom of the foot where it causes dry white scales and peeling of the skin. This infection is more likely to occur when the foot is in hot, sweaty socks and shoes for long periods of time. You may feel itching and burning between your toes. This infection is treated with skin creams or medicine taken by mouth.  Home care  The following are general care guidelines:    It is important to keep the feet dry. Use absorbent cotton socks and change them if they become sweaty. Or wear an open-toe shoe or sandal. Wash the feet at least once a day with soap and water.    Apply the antifungal cream as prescribed. Some antifungal creams are available without a prescription.    It may take a week before the rash starts to improve. It can take about 3 to 4 weeks to completely clear. Continue the medicine until the rash is all gone.    Use over-the-counter antifungal powders or sprays on your feet after exposure to high-risk environments, such as public showers, gyms, and locker rooms. This can help prevent future infections. Wearing appropriate shoes in these situations can help.  Prevention  The following tips may help prevent athlete s foot:    Don't share shoes or socks with someone who has athlete's foot.    Don't walk barefoot in places where a fungal infection can spread quickly such as locker rooms, showers, and swimming pools.    Change socks regularly.    Alternate shoes to assist in drying.  Follow-up  care  Follow up with your healthcare provider as recommended if the rash does not improve after 10 days of treatment, or if the rash continues to spread.  When to seek medical care  Get medical attention right away if any of the following occur:    Fever of 100.4 F (38 C) or higher, or as directed    Increasing redness or swelling of the foot    Infection comes back soon after treatment    Pus draining from cracks in the skin  Date Last Reviewed: 8/1/2016 2000-2017 [employer]. [address] [place] [address]. All rights reserved. This information is not intended as a substitute for professional medical care. Always follow your healthcare professional's instructions.

## 2018-08-08 NOTE — MR AVS SNAPSHOT
After Visit Summary   8/8/2018    Isabel Talbot    MRN: [id number]           Patient Information     Date Of Birth          1965        Visit Information        Provider Department      8/8/2018 3:45 PM Leela Carrion PA-C [facility]        Today's Diagnoses     Tobacco abuse    -  1    Tinea pedis of both feet        Dermatitis        Benign essential hypertension          Care Instructions    Blood pressure is elevated today. Encouraged to monitor blood pressure a couple times a week over the next few weeks. Return in 1-2 weeks for a recheck appointment. Work on diet and exercise to decrease blood pressure. Weight loss is helpful.  Decrease salt intake.      Started on chantix for smoking cessation.         Athlete s Foot    Athlete s foot (tinea pedis) is caused by a fungal infection in the skin. It affects the skin between the toes, causing cracks in the skin called fissures. It can also affect the bottom of the foot where it causes dry white scales and peeling of the skin. This infection is more likely to occur when the foot is in hot, sweaty socks and shoes for long periods of time. You may feel itching and burning between your toes. This infection is treated with skin creams or medicine taken by mouth.  Home care  The following are general care guidelines:    It is important to keep the feet dry. Use absorbent cotton socks and change them if they become sweaty. Or wear an open-toe shoe or sandal. Wash the feet at least once a day with soap and water.    Apply the antifungal cream as prescribed. Some antifungal creams are available without a prescription.    It may take a week before the rash starts to improve. It can take about 3 to 4 weeks to completely clear. Continue the medicine until the rash is all gone.    Use over-the-counter antifungal powders or sprays on your feet after exposure to high-risk environments, such as public showers, gyms, and locker  rooms. This can help prevent future infections. Wearing appropriate shoes in these situations can help.  Prevention  The following tips may help prevent athlete s foot:    Don't share shoes or socks with someone who has athlete's foot.    Don't walk barefoot in places where a fungal infection can spread quickly such as locker rooms, showers, and swimming pools.    Change socks regularly.    Alternate shoes to assist in drying.  Follow-up care  Follow up with your healthcare provider as recommended if the rash does not improve after 10 days of treatment, or if the rash continues to spread.  When to seek medical care  Get medical attention right away if any of the following occur:    Fever of 100.4 F (38 C) or higher, or as directed    Increasing redness or swelling of the foot    Infection comes back soon after treatment    Pus draining from cracks in the skin  Date Last Reviewed: 8/1/2016 2000-2017 [employer]. [address] [place] [address]. All rights reserved. This information is not intended as a substitute for professional medical care. Always follow your healthcare professional's instructions.                Follow-ups after your visit        Who to contact     If you have questions or need follow up information about today's clinic visit or your schedule please contact [facility] directly at [phone].  Normal or non-critical lab and imaging results will be communicated to you by MyChart, letter or phone within 4 business days after the clinic has received the results. If you do not hear from us within 7 days, please contact the clinic through [employer] or phone. If you have a critical or abnormal lab result, we will notify you by phone as soon as possible.  Submit refill requests through [employer] or call your pharmacy and they will forward the refill request to us. Please allow 3 business days for your refill to be completed.          Additional Information  About Your Visit        Care EveryWhere ID     This is your Care EveryWhere ID. This could be used by other organizations to access your [facility] medical records  [id number]        Your Vitals Were     Pulse Temperature Breastfeeding? BMI (Body Mass Index)          52 96.7  F (35.9  C) (Tympanic) No 22.86 kg/m2         Blood Pressure from Last 3 Encounters:   08/08/18 172/86   06/28/18 128/67   06/28/18 167/84    Weight from Last 3 Encounters:   08/08/18 137 lb 6.4 oz (62.3 kg)   07/03/18 134 lb (60.8 kg)   06/28/18 134 lb 14.7 oz (61.2 kg)              Today, you had the following     No orders found for display         Today's Medication Changes          These changes are accurate as of 8/8/18  4:30 PM.  If you have any questions, ask your nurse or doctor.               Start taking these medicines.        Dose/Directions    clotrimazole 1 % cream   Commonly known as:  LOTRIMIN   Used for:  Tinea pedis of both feet   Started by:  Leela Carrion PA-C        Apply topically 2 times daily Apply to feet   Quantity:  30 g   Refills:  1       losartan 50 MG tablet   Commonly known as:  COZAAR   Used for:  Benign essential hypertension   Started by:  Leela Carrion PA-C        Dose:  50 mg   Take 1 tablet (50 mg) by mouth daily   Quantity:  90 tablet   Refills:  3       triamcinolone 0.1 % ointment   Commonly known as:  KENALOG   Used for:  Dermatitis   Started by:  Leela Carrion PA-C        Apply sparingly to affected area three times daily as needed. Apply to hands   Quantity:  30 g   Refills:  1       * varenicline 0.5 MG X 11 & 1 MG X 42 tablet   Commonly known as:  CHANTIX STARTING MONTH PAK   Used for:  Tobacco abuse   Started by:  Leela Carrion PA-C        Take 0.5 mg tab daily for 3 days, then 0.5 mg tab twice daily for 4 days, then 1 mg twice daily.   Quantity:  53 tablet   Refills:  0       * varenicline 1 MG tablet   Commonly known as:  CHANTIX   Used for:  Tobacco abuse   Started by:   Leela Carrion PA-C        Dose:  1 mg   Take 1 tablet (1 mg) by mouth 2 times daily   Quantity:  60 tablet   Refills:  2       * Notice:  This list has 2 medication(s) that are the same as other medications prescribed for you. Read the directions carefully, and ask your doctor or other care provider to review them with you.         Where to get your medicines      These medications were sent to [facility] [address] [address]     Phone:  [phone]     clotrimazole 1 % cream    losartan 50 MG tablet    triamcinolone 0.1 % ointment    varenicline 0.5 MG X 11 & 1 MG X 42 tablet    varenicline 1 MG tablet                Primary Care Provider Office Phone # Fax #    Bakari Guillen [phone]       [address] [facility] [address]        Equal Access to Services     [facility] Hospital[facility]: Hadii joce cabrerao Soerika, waaxda luqadaha, qaybta kaalmada adeegyada, dionne sabillon . So [facility] [phone].    ATENCIÓN: Si alonzo troncoso, tiene a golden disposición servicios gratuitos de asistencia lingüística. Llame al [phone].    We comply with applicable federal civil rights laws and [place] laws. We do not discriminate on the basis of race, color, national origin, age, disability, sex, sexual orientation, or gender identity.            Thank you!     Thank you for choosing [facility] [place]  for your care. Our goal is always to provide you with excellent care. Hearing back from our patients is one way we can continue to improve our services. Please take a few minutes to complete the written survey that you may receive in the mail after your visit with us. Thank you!             Your Updated Medication List - Protect others around you: Learn how to safely use, store and throw away your medicines at www.disposemymeds.org.          This list is accurate as of 8/8/18  4:30 PM.   Always use your most recent med list.                   Brand Name Dispense Instructions for use Diagnosis    cholecalciferol 1000 units Tabs      Take 1000 units by mouth alternating with 2000 units every other day        clotrimazole 1 % cream    LOTRIMIN    30 g    Apply topically 2 times daily Apply to feet    Tinea pedis of both feet       folic acid 1 MG tablet    FOLVITE     Take 1 mg by mouth daily        GOODYS EXTRA STRENGTH 500-325-65 MG Pack   Generic drug:  Aspirin-Acetaminophen-Caffeine      Take 1 packet by mouth 2 times daily as needed        losartan 50 MG tablet    COZAAR    90 tablet    Take 1 tablet (50 mg) by mouth daily    Benign essential hypertension       nicotine 21-14-7 MG/24HR Kit     70 kit    Place 1 patch onto the skin daily Use 21 mg patch qd for 6 weeks, then 14 mg x 2 weeks, then 7 mg x 2 weeks    Cigarette nicotine dependence without complication       triamcinolone 0.1 % ointment    KENALOG    30 g    Apply sparingly to affected area three times daily as needed. Apply to hands    Dermatitis       * varenicline 0.5 MG X 11 & 1 MG X 42 tablet    CHANTIX STARTING MONTH NARGIS    53 tablet    Take 0.5 mg tab daily for 3 days, then 0.5 mg tab twice daily for 4 days, then 1 mg twice daily.    Tobacco abuse       * varenicline 1 MG tablet    CHANTIX    60 tablet    Take 1 tablet (1 mg) by mouth 2 times daily    Tobacco abuse       * Notice:  This list has 2 medication(s) that are the same as other medications prescribed for you. Read the directions carefully, and ask your doctor or other care provider to review them with you.

## 2018-08-08 NOTE — NURSING NOTE
"Chief Complaint   Patient presents with     Derm Problem     Pt present to clinic today for skin issues, she just stopped taking methotrexate and her hands are peeling and blistering. She is wondering if they are infected.  Evon Gallego LPN on 8/8/2018 at 3:51 PM   Initial /86 (BP Location: Right arm, Patient Position: Sitting, Cuff Size: Adult Regular)  Pulse 52  Temp 96.7  F (35.9  C) (Tympanic)  Wt 137 lb 6.4 oz (62.3 kg)  Breastfeeding? No  BMI 22.86 kg/m2 Estimated body mass index is 22.86 kg/(m^2) as calculated from the following:    Height as of 7/3/18: 5' 5\" (1.651 m).    Weight as of this encounter: 137 lb 6.4 oz (62.3 kg).  Medication Reconciliation: complete    Evon Gallego LPN  "

## 2018-08-08 NOTE — PROGRESS NOTES
"Nursing Notes:   Evon Gallego LPN  8/8/2018  4:04 PM  Signed  Chief Complaint   Patient presents with     Derm Problem     Pt present to clinic today for skin issues, she just stopped taking methotrexate and her hands are peeling and blistering. She is wondering if they are infected.  Evon Gallego LPN on 8/8/2018 at 3:51 PM   Initial /86 (BP Location: Right arm, Patient Position: Sitting, Cuff Size: Adult Regular)  Pulse 52  Temp 96.7  F (35.9  C) (Tympanic)  Wt 137 lb 6.4 oz (62.3 kg)  Breastfeeding? No  BMI 22.86 kg/m2 Estimated body mass index is 22.86 kg/(m^2) as calculated from the following:    Height as of 7/3/18: 5' 5\" (1.651 m).    Weight as of this encounter: 137 lb 6.4 oz (62.3 kg).  Medication Reconciliation: complete    Evon Gallego LPN    HPI:    Isabel Talbot is a 53 year old female who presents for skin issues, she just stopped taking methotrexate and her hands are peeling and blistering. She is wondering if they are infected.  She states that she has had the irritation for a couple weeks.  She discontinued her losartan mid July she was concerned she was having a medication side effect.  Since then she has not been monitoring her blood pressure except for last night.    Currently on methotrexate for rheumatoid arthritis.  Recheck appointment with rheumatologist on 8/28.     HTN: BP last night was 180/100. Pulse 58.   Started retention meds last September.   Metoprolol - made heart rate too slow.  She had a cough side effect with lisinopril.  Previously she tolerated losartan well.  No chest pain, palpitations, problems breathing, fevers, chills, arm pain, jaw pain.  The hands do not have erythema or pus.  No hives appreciated.  Skin on her hands is dry and irritated.  It is also peeling.  Skin on the bottom of her feet and in between her toes has been irritated as well.  It is dry and itchy.    Past Medical History:   Diagnosis Date     Benign essential hypertension " 3/13/2018     Encounter for tobacco use cessation counseling 3/13/2018     Hypovitaminosis D 3/1/2018     Paroxysmal atrial fibrillation (H) 3/1/2018     Rheumatoid arthritis, involving unspecified site, unspecified rheumatoid factor presence (H) 3/1/2018       Past Surgical History:   Procedure Laterality Date     COLONOSCOPY N/A 4/2/2018    F/U 2028     HAND SURGERY      s/p fall - age 19 or 20       Family History   Problem Relation Age of Onset     Cancer Mother      throat, tonsillar cancer     HEART DISEASE Father      Hypertension Father      Hyperlipidemia Father        Social History     Social History     Marital status: Single     Spouse name: N/A     Number of children: N/A     Years of education: N/A     Occupational History     Not on file.     Social History Main Topics     Smoking status: Current Every Day Smoker     Packs/day: 0.50     Years: 38.00     Types: Cigarettes     Smokeless tobacco: Never Used     Alcohol use Yes      Comment: occasional.     Drug use: No     Sexual activity: Not Currently     Other Topics Concern     Not on file     Social History Narrative    Moved to [place] in November 2017 from [place].     Tobacco use.     .     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     Grew-up in [place]. Was in [place]13 years, after [place].        Current Outpatient Prescriptions   Medication Sig Dispense Refill     Aspirin-Acetaminophen-Caffeine (GOODYS EXTRA STRENGTH) 500-325-65 MG PACK Take 1 packet by mouth 2 times daily as needed       cholecalciferol 1000 units TABS Take 1000 units by mouth alternating with 2000 units every other day       clotrimazole (LOTRIMIN) 1 % cream Apply topically 2 times daily Apply to feet 30 g 1     folic acid (FOLVITE) 1 MG tablet Take 1 mg by mouth daily        losartan (COZAAR) 50 MG tablet Take 1 tablet (50 mg) by mouth daily 90 tablet 3     triamcinolone (KENALOG) 0.1 % ointment Apply sparingly to affected area three times daily as  needed. Apply to hands 30 g 1     varenicline (CHANTIX STARTING MONTH NARGIS) 0.5 MG X 11 & 1 MG X 42 tablet Take 0.5 mg tab daily for 3 days, then 0.5 mg tab twice daily for 4 days, then 1 mg twice daily. 53 tablet 0     varenicline (CHANTIX) 1 MG tablet Take 1 tablet (1 mg) by mouth 2 times daily 60 tablet 2     nicotine 21-14-7 MG/24HR KIT Place 1 patch onto the skin daily Use 21 mg patch qd for 6 weeks, then 14 mg x 2 weeks, then 7 mg x 2 weeks (Patient not taking: Reported on 8/8/2018) 70 kit 0       Allergies   Allergen Reactions     Ace Inhibitors Cough     Metoprolol Other (See Comments)     Low heart rate       REVIEW OF SYSTEMS:  Refer to HPI.    EXAM:   Vitals:    /86 (BP Location: Right arm, Patient Position: Sitting, Cuff Size: Adult Regular)  Pulse 52  Temp 96.7  F (35.9  C) (Tympanic)  Wt 137 lb 6.4 oz (62.3 kg)  Breastfeeding? No  BMI 22.86 kg/m2    General Appearance: Pleasant, alert, appropriate appearance for age. No acute distress  Chest/Respiratory Exam: Normal chest wall and respirations. Clear to auscultation.  Cardiovascular Exam: Regular rate and rhythm. S1, S2, no murmur, click, gallop, or rubs.  Gastrointestinal Exam: Soft, non-tender, no masses or organomegaly. Normal BS x 4.  Skin: Dry peeling skin appreciated throughout the hands, especially in the palms.  No erythema, open wounds, pus, drainage, warmth, tenderness.  Dry peeling skin appreciated scattered throughout the plantar side of the distal feet and also in between the toes bilaterally.  No open wounds, pus, drainage, warmth.  Psychiatric Exam: Alert and oriented - appropriate affect.    PHQ Depression Screen  PHQ-9 SCORE 3/13/2018 4/9/2018 6/28/2018   Total Score 0 0 2       ASSESSMENT AND PLAN:    1. Tobacco abuse    2. Tinea pedis of both feet    3. Dermatitis    4. Benign essential hypertension          Tobacco abuse: Patient was started on Chantix for tobacco abuse.  Gave side effect profile.  Return as needed for  recheck.    Patient's rash on her hands is likely a dermatitis reaction due to her body reacting to the fungal infection on her feet.  Start clotrimazole cream for athlete's feet.  Patient will use triamcinolone ointment on her hands to calm down the inflammation and irritation.  Rash on the hands and feet does not appear to be an allergic reaction.  No concerns with side effects in regards to losartan.  Patient's blood pressure is elevated today.  Will restart losartan 50 mg daily.    Encouraged to call in the next few days with her blood pressures to ensure that they are decreasing back to the normal range.    Blood pressure is elevated today. Encouraged to monitor blood pressure a couple times a week over the next few weeks. Return in 1-2 weeks for a recheck appointment. Work on diet and exercise to decrease blood pressure. Weight loss is helpful.  Decrease salt intake.      Greater than 25 minutes were spent in counseling and coordination of care.     Patient Instructions   Blood pressure is elevated today. Encouraged to monitor blood pressure a couple times a week over the next few weeks. Return in 1-2 weeks for a recheck appointment. Work on diet and exercise to decrease blood pressure. Weight loss is helpful.  Decrease salt intake.      Started on chantix for smoking cessation.         Athlete s Foot    Athlete s foot (tinea pedis) is caused by a fungal infection in the skin. It affects the skin between the toes, causing cracks in the skin called fissures. It can also affect the bottom of the foot where it causes dry white scales and peeling of the skin. This infection is more likely to occur when the foot is in hot, sweaty socks and shoes for long periods of time. You may feel itching and burning between your toes. This infection is treated with skin creams or medicine taken by mouth.  Home care  The following are general care guidelines:    It is important to keep the feet dry. Use absorbent cotton socks  and change them if they become sweaty. Or wear an open-toe shoe or sandal. Wash the feet at least once a day with soap and water.    Apply the antifungal cream as prescribed. Some antifungal creams are available without a prescription.    It may take a week before the rash starts to improve. It can take about 3 to 4 weeks to completely clear. Continue the medicine until the rash is all gone.    Use over-the-counter antifungal powders or sprays on your feet after exposure to high-risk environments, such as public showers, gyms, and locker rooms. This can help prevent future infections. Wearing appropriate shoes in these situations can help.  Prevention  The following tips may help prevent athlete s foot:    Don't share shoes or socks with someone who has athlete's foot.    Don't walk barefoot in places where a fungal infection can spread quickly such as locker rooms, showers, and swimming pools.    Change socks regularly.    Alternate shoes to assist in drying.  Follow-up care  Follow up with your healthcare provider as recommended if the rash does not improve after 10 days of treatment, or if the rash continues to spread.  When to seek medical care  Get medical attention right away if any of the following occur:    Fever of 100.4 F (38 C) or higher, or as directed    Increasing redness or swelling of the foot    Infection comes back soon after treatment    Pus draining from cracks in the skin  Date Last Reviewed: 8/1/2016 2000-2017 [employer]. [address] [place] [address], [place] [address]. All rights reserved. This information is not intended as a substitute for professional medical care. Always follow your healthcare professional's instructions.           Leela Carrion PA-C..................8/8/2018 4:10 PM

## 2018-10-15 ENCOUNTER — TELEPHONE (OUTPATIENT)
Dept: FAMILY MEDICINE | Facility: OTHER | Age: 53
End: 2018-10-15

## 2018-10-15 DIAGNOSIS — K52.831 COLLAGENOUS COLITIS: Primary | ICD-10-CM

## 2018-10-15 NOTE — TELEPHONE ENCOUNTER
Patient was in remission from collagenous colitis. She has now come out  of  remission and would like medication.

## 2018-10-16 RX ORDER — BUDESONIDE 3 MG/1
9 CAPSULE, COATED PELLETS ORAL EVERY MORNING
Qty: 90 CAPSULE | Refills: 0 | Status: ON HOLD | OUTPATIENT
Start: 2018-10-16 | End: 2018-11-29

## 2018-10-16 NOTE — TELEPHONE ENCOUNTER
I sent over a prescription for budesonide to take 9 mg every morning over the next month.  Please return for a clinic appointment in 3 weeks for monitoring to let us know how you are doing with the medication as we will need to discuss how to stop the medication or weane off.  Leela Carrion PA-C..................10/16/2018 3:55 PM

## 2018-10-16 NOTE — TELEPHONE ENCOUNTER
"Patient has Collagenous colitis that has flared up and is having continuous diarrhea.  Last April she was told \"She can call in the next 1-2 weeks if symptoms are not calming down for a prescription for budesonide if needed.\"  Patient is wondering if she can get this now.  Courtney Esparza LPN ...... 10/16/2018 1:47 PM    "

## 2018-11-16 ENCOUNTER — TRANSFERRED RECORDS (OUTPATIENT)
Dept: HEALTH INFORMATION MANAGEMENT | Facility: OTHER | Age: 53
End: 2018-11-16

## 2018-11-29 ENCOUNTER — APPOINTMENT (OUTPATIENT)
Dept: GENERAL RADIOLOGY | Facility: OTHER | Age: 53
DRG: 310 | End: 2018-11-29
Attending: STUDENT IN AN ORGANIZED HEALTH CARE EDUCATION/TRAINING PROGRAM
Payer: COMMERCIAL

## 2018-11-29 ENCOUNTER — HOSPITAL ENCOUNTER (INPATIENT)
Facility: OTHER | Age: 53
LOS: 1 days | Discharge: HOME | DRG: 310 | End: 2018-11-30
Attending: STUDENT IN AN ORGANIZED HEALTH CARE EDUCATION/TRAINING PROGRAM | Admitting: INTERNAL MEDICINE
Payer: COMMERCIAL

## 2018-11-29 DIAGNOSIS — I48.91 ATRIAL FIBRILLATION WITH RVR (H): ICD-10-CM

## 2018-11-29 DIAGNOSIS — M06.9 RHEUMATOID ARTHRITIS, INVOLVING UNSPECIFIED SITE, UNSPECIFIED RHEUMATOID FACTOR PRESENCE: ICD-10-CM

## 2018-11-29 DIAGNOSIS — I10 ESSENTIAL HYPERTENSION, MALIGNANT: ICD-10-CM

## 2018-11-29 DIAGNOSIS — F17.210 CIGARETTE SMOKER: ICD-10-CM

## 2018-11-29 LAB
ALBUMIN SERPL-MCNC: 3.7 G/DL (ref 3.5–5.7)
ALP SERPL-CCNC: 55 U/L (ref 34–104)
ALT SERPL W P-5'-P-CCNC: 13 U/L (ref 7–52)
ANION GAP SERPL CALCULATED.3IONS-SCNC: 6 MMOL/L (ref 3–14)
AST SERPL W P-5'-P-CCNC: 15 U/L (ref 13–39)
BASOPHILS # BLD AUTO: 0.1 10E9/L (ref 0–0.2)
BASOPHILS NFR BLD AUTO: 2 %
BILIRUB SERPL-MCNC: 0.5 MG/DL (ref 0.3–1)
BUN SERPL-MCNC: 14 MG/DL (ref 7–25)
CALCIUM SERPL-MCNC: 9.4 MG/DL (ref 8.6–10.3)
CHLORIDE SERPL-SCNC: 108 MMOL/L (ref 98–107)
CO2 SERPL-SCNC: 25 MMOL/L (ref 21–31)
CREAT SERPL-MCNC: 0.86 MG/DL (ref 0.6–1.2)
DIFFERENTIAL METHOD BLD: NORMAL
EOSINOPHIL # BLD AUTO: 0.2 10E9/L (ref 0–0.7)
EOSINOPHIL NFR BLD AUTO: 3.5 %
ERYTHROCYTE [DISTWIDTH] IN BLOOD BY AUTOMATED COUNT: 12.8 % (ref 10–15)
GFR SERPL CREATININE-BSD FRML MDRD: 69 ML/MIN/1.7M2
GLUCOSE SERPL-MCNC: 103 MG/DL (ref 70–105)
HCT VFR BLD AUTO: 37.7 % (ref 35–47)
HGB BLD-MCNC: 12.9 G/DL (ref 11.7–15.7)
IMM GRANULOCYTES # BLD: 0 10E9/L (ref 0–0.4)
IMM GRANULOCYTES NFR BLD: 0.2 %
LYMPHOCYTES # BLD AUTO: 1.9 10E9/L (ref 0.8–5.3)
LYMPHOCYTES NFR BLD AUTO: 34.5 %
MAGNESIUM SERPL-MCNC: 1.9 MG/DL (ref 1.9–2.7)
MCH RBC QN AUTO: 31.7 PG (ref 26.5–33)
MCHC RBC AUTO-ENTMCNC: 34.2 G/DL (ref 31.5–36.5)
MCV RBC AUTO: 93 FL (ref 78–100)
MONOCYTES # BLD AUTO: 0.6 10E9/L (ref 0–1.3)
MONOCYTES NFR BLD AUTO: 10.4 %
NEUTROPHILS # BLD AUTO: 2.7 10E9/L (ref 1.6–8.3)
NEUTROPHILS NFR BLD AUTO: 49.4 %
NT-PROBNP SERPL-MCNC: 950 PG/ML (ref 0–100)
PLATELET # BLD AUTO: 224 10E9/L (ref 150–450)
POTASSIUM SERPL-SCNC: 4 MMOL/L (ref 3.5–5.1)
PROT SERPL-MCNC: 6.6 G/DL (ref 6.4–8.9)
RBC # BLD AUTO: 4.07 10E12/L (ref 3.8–5.2)
SODIUM SERPL-SCNC: 139 MMOL/L (ref 134–144)
TROPONIN I SERPL-MCNC: <0.03 UG/L (ref 0–0.03)
TSH SERPL DL<=0.05 MIU/L-ACNC: 1.22 IU/ML (ref 0.34–5.6)
WBC # BLD AUTO: 5.4 10E9/L (ref 4–11)

## 2018-11-29 PROCEDURE — 84443 ASSAY THYROID STIM HORMONE: CPT | Performed by: STUDENT IN AN ORGANIZED HEALTH CARE EDUCATION/TRAINING PROGRAM

## 2018-11-29 PROCEDURE — 83880 ASSAY OF NATRIURETIC PEPTIDE: CPT | Performed by: STUDENT IN AN ORGANIZED HEALTH CARE EDUCATION/TRAINING PROGRAM

## 2018-11-29 PROCEDURE — 93005 ELECTROCARDIOGRAM TRACING: CPT | Performed by: STUDENT IN AN ORGANIZED HEALTH CARE EDUCATION/TRAINING PROGRAM

## 2018-11-29 PROCEDURE — 71045 X-RAY EXAM CHEST 1 VIEW: CPT

## 2018-11-29 PROCEDURE — 20000006 ZZH R&B ICU - GICH

## 2018-11-29 PROCEDURE — 85025 COMPLETE CBC W/AUTO DIFF WBC: CPT | Performed by: STUDENT IN AN ORGANIZED HEALTH CARE EDUCATION/TRAINING PROGRAM

## 2018-11-29 PROCEDURE — 25000128 H RX IP 250 OP 636: Performed by: STUDENT IN AN ORGANIZED HEALTH CARE EDUCATION/TRAINING PROGRAM

## 2018-11-29 PROCEDURE — 99223 1ST HOSP IP/OBS HIGH 75: CPT | Performed by: INTERNAL MEDICINE

## 2018-11-29 PROCEDURE — 93010 ELECTROCARDIOGRAM REPORT: CPT | Performed by: INTERNAL MEDICINE

## 2018-11-29 PROCEDURE — 83735 ASSAY OF MAGNESIUM: CPT | Performed by: STUDENT IN AN ORGANIZED HEALTH CARE EDUCATION/TRAINING PROGRAM

## 2018-11-29 PROCEDURE — 36415 COLL VENOUS BLD VENIPUNCTURE: CPT | Performed by: STUDENT IN AN ORGANIZED HEALTH CARE EDUCATION/TRAINING PROGRAM

## 2018-11-29 PROCEDURE — 99285 EMERGENCY DEPT VISIT HI MDM: CPT | Mod: Z6 | Performed by: STUDENT IN AN ORGANIZED HEALTH CARE EDUCATION/TRAINING PROGRAM

## 2018-11-29 PROCEDURE — 80053 COMPREHEN METABOLIC PANEL: CPT | Performed by: STUDENT IN AN ORGANIZED HEALTH CARE EDUCATION/TRAINING PROGRAM

## 2018-11-29 PROCEDURE — 96366 THER/PROPH/DIAG IV INF ADDON: CPT | Performed by: STUDENT IN AN ORGANIZED HEALTH CARE EDUCATION/TRAINING PROGRAM

## 2018-11-29 PROCEDURE — 99285 EMERGENCY DEPT VISIT HI MDM: CPT | Mod: 25 | Performed by: STUDENT IN AN ORGANIZED HEALTH CARE EDUCATION/TRAINING PROGRAM

## 2018-11-29 PROCEDURE — 84484 ASSAY OF TROPONIN QUANT: CPT | Performed by: STUDENT IN AN ORGANIZED HEALTH CARE EDUCATION/TRAINING PROGRAM

## 2018-11-29 PROCEDURE — 96365 THER/PROPH/DIAG IV INF INIT: CPT | Performed by: STUDENT IN AN ORGANIZED HEALTH CARE EDUCATION/TRAINING PROGRAM

## 2018-11-29 PROCEDURE — 96376 TX/PRO/DX INJ SAME DRUG ADON: CPT | Performed by: STUDENT IN AN ORGANIZED HEALTH CARE EDUCATION/TRAINING PROGRAM

## 2018-11-29 RX ORDER — LOSARTAN POTASSIUM 50 MG/1
50 TABLET ORAL DAILY
Status: DISCONTINUED | OUTPATIENT
Start: 2018-11-30 | End: 2018-11-30 | Stop reason: HOSPADM

## 2018-11-29 RX ORDER — AMOXICILLIN 250 MG
1 CAPSULE ORAL 2 TIMES DAILY PRN
Status: DISCONTINUED | OUTPATIENT
Start: 2018-11-29 | End: 2018-11-30 | Stop reason: HOSPADM

## 2018-11-29 RX ORDER — LIDOCAINE 40 MG/G
CREAM TOPICAL
Status: DISCONTINUED | OUTPATIENT
Start: 2018-11-29 | End: 2018-11-30 | Stop reason: HOSPADM

## 2018-11-29 RX ORDER — ACETAMINOPHEN 325 MG/1
650 TABLET ORAL EVERY 4 HOURS PRN
Status: DISCONTINUED | OUTPATIENT
Start: 2018-11-29 | End: 2018-11-30 | Stop reason: HOSPADM

## 2018-11-29 RX ORDER — BISACODYL 10 MG
10 SUPPOSITORY, RECTAL RECTAL DAILY PRN
Status: DISCONTINUED | OUTPATIENT
Start: 2018-11-29 | End: 2018-11-30 | Stop reason: HOSPADM

## 2018-11-29 RX ORDER — PROCHLORPERAZINE MALEATE 5 MG
10 TABLET ORAL EVERY 6 HOURS PRN
Status: DISCONTINUED | OUTPATIENT
Start: 2018-11-29 | End: 2018-11-30 | Stop reason: HOSPADM

## 2018-11-29 RX ORDER — POTASSIUM CHLORIDE 1500 MG/1
20-40 TABLET, EXTENDED RELEASE ORAL
Status: DISCONTINUED | OUTPATIENT
Start: 2018-11-29 | End: 2018-11-30 | Stop reason: HOSPADM

## 2018-11-29 RX ORDER — METOPROLOL TARTRATE 25 MG/1
25 TABLET, FILM COATED ORAL ONCE
Status: DISCONTINUED | OUTPATIENT
Start: 2018-11-29 | End: 2018-11-29

## 2018-11-29 RX ORDER — BUDESONIDE 3 MG/1
9 CAPSULE, COATED PELLETS ORAL EVERY MORNING
Status: DISCONTINUED | OUTPATIENT
Start: 2018-11-30 | End: 2018-11-29

## 2018-11-29 RX ORDER — POTASSIUM CHLORIDE 7.45 MG/ML
10 INJECTION INTRAVENOUS
Status: DISCONTINUED | OUTPATIENT
Start: 2018-11-29 | End: 2018-11-30 | Stop reason: HOSPADM

## 2018-11-29 RX ORDER — PROCHLORPERAZINE 25 MG
25 SUPPOSITORY, RECTAL RECTAL EVERY 12 HOURS PRN
Status: DISCONTINUED | OUTPATIENT
Start: 2018-11-29 | End: 2018-11-30 | Stop reason: HOSPADM

## 2018-11-29 RX ORDER — MAGNESIUM SULFATE HEPTAHYDRATE 40 MG/ML
4 INJECTION, SOLUTION INTRAVENOUS EVERY 4 HOURS PRN
Status: DISCONTINUED | OUTPATIENT
Start: 2018-11-29 | End: 2018-11-30 | Stop reason: HOSPADM

## 2018-11-29 RX ORDER — ONDANSETRON 2 MG/ML
4 INJECTION INTRAMUSCULAR; INTRAVENOUS EVERY 6 HOURS PRN
Status: DISCONTINUED | OUTPATIENT
Start: 2018-11-29 | End: 2018-11-29

## 2018-11-29 RX ORDER — METOPROLOL TARTRATE 1 MG/ML
5 INJECTION, SOLUTION INTRAVENOUS EVERY 5 MIN PRN
Status: DISCONTINUED | OUTPATIENT
Start: 2018-11-29 | End: 2018-11-29

## 2018-11-29 RX ORDER — ACETAMINOPHEN 650 MG/1
650 SUPPOSITORY RECTAL EVERY 4 HOURS PRN
Status: DISCONTINUED | OUTPATIENT
Start: 2018-11-29 | End: 2018-11-30 | Stop reason: HOSPADM

## 2018-11-29 RX ORDER — ONDANSETRON 4 MG/1
4 TABLET, ORALLY DISINTEGRATING ORAL EVERY 6 HOURS PRN
Status: DISCONTINUED | OUTPATIENT
Start: 2018-11-29 | End: 2018-11-29

## 2018-11-29 RX ORDER — NALOXONE HYDROCHLORIDE 0.4 MG/ML
.1-.4 INJECTION, SOLUTION INTRAMUSCULAR; INTRAVENOUS; SUBCUTANEOUS
Status: DISCONTINUED | OUTPATIENT
Start: 2018-11-29 | End: 2018-11-30 | Stop reason: HOSPADM

## 2018-11-29 RX ORDER — SODIUM CHLORIDE 9 MG/ML
INJECTION, SOLUTION INTRAVENOUS CONTINUOUS
Status: DISCONTINUED | OUTPATIENT
Start: 2018-11-29 | End: 2018-11-29

## 2018-11-29 RX ORDER — AMOXICILLIN 250 MG
2 CAPSULE ORAL 2 TIMES DAILY PRN
Status: DISCONTINUED | OUTPATIENT
Start: 2018-11-29 | End: 2018-11-30 | Stop reason: HOSPADM

## 2018-11-29 RX ADMIN — AMIODARONE HYDROCHLORIDE 150 MG: 1.5 INJECTION, SOLUTION INTRAVENOUS at 11:28

## 2018-11-29 RX ADMIN — AMIODARONE HYDROCHLORIDE 1 MG/MIN: 1.8 INJECTION, SOLUTION INTRAVENOUS at 11:50

## 2018-11-29 ASSESSMENT — ENCOUNTER SYMPTOMS
COUGH: 0
SHORTNESS OF BREATH: 1
WEAKNESS: 0
HEADACHES: 0
LIGHT-HEADEDNESS: 0
PALPITATIONS: 1
NAUSEA: 0
CHILLS: 0
FEVER: 0
VOMITING: 0
DIZZINESS: 0

## 2018-11-29 ASSESSMENT — ACTIVITIES OF DAILY LIVING (ADL)
ADLS_ACUITY_SCORE: 12
ADLS_ACUITY_SCORE: 12

## 2018-11-29 NOTE — PLAN OF CARE
Problem: Arrhythmia/Dysrhythmia (Symptomatic) (Adult)  Goal: Signs and Symptoms of Listed Potential Problems Will be Absent, Minimized or Managed (Arrhythmia/Dysrhythmia)  Signs and symptoms of listed potential problems will be absent, minimized or managed by discharge/transition of care (reference Arrhythmia/Dysrhythmia (Symptomatic) (Adult) CPG).   Outcome: Improving  VSS. Amiodarone gtt continues. Pt denies chest pain, dizziness, nausea.  Jordana Wallace

## 2018-11-29 NOTE — IP AVS SNAPSHOT
MRN:[id number]                      After Visit Summary   11/29/2018    Isabel Talbot    MRN: [id number]           Thank you!     Thank you for choosing [facility] for your care. Our goal is always to provide you with excellent care. Hearing back from our patients is one way we can continue to improve our services. Please take a few minutes to complete the written survey that you may receive in the mail after you visit with us. Thank you!        Patient Information     Date Of Birth          1965        Designated Caregiver       Most Recent Value    Caregiver    Will someone help with your care after discharge? no [pt independent]      About your hospital stay     You were admitted on:  November 29, 2018 You last received care in the:  [facility]    You were discharged on:  November 30, 2018        Reason for your hospital stay       Atrial fibrillation                  Who to Call     For medical emergencies, please call 911.  For non-urgent questions about your medical care, please call your primary care provider or clinic, [phone]          Attending Provider     Provider Specialty    Karlene Smith MD Emergency Medicine    [facility], [place] LANDRUM MD Internal Medicine       Primary Care Provider Office Phone # Fax #    Bakari Guillen [phone]      After Care Instructions     Activity       Your activity upon discharge: activity as tolerated            Diet       Follow this diet upon discharge: Orders Placed This Encounter      Combination Diet Regular Diet Adult                    Follow-up Appointments     Follow-up and recommended labs and tests       Follow up with Dr. Bakari Guillen  In next 1-2 weeks. We will call you                  Pending Results     No orders found for last 3 day(s).            Statement of Approval     Ordered          11/30/18 0729  I have reviewed and agree with all the recommendations and orders detailed in this  "document.  EFFECTIVE NOW     Approved and electronically signed by:  Quincy Martin MD             Admission Information     Date & Time Provider Department Dept. Phone    2018 Quincy Martin MD [facility] [phone]      Your Vitals Were     Blood Pressure Pulse Temperature Respirations Weight Pulse Oximetry    120/74 78 97.3  F (36.3  C) (Tympanic) 20 62.6 kg (138 lb 0.1 oz) 95%    BMI (Body Mass Index)                   22.97 kg/m2           My[employer] Information     [facility] lets you send messages to your doctor, view your test results, renew your prescriptions, schedule appointments and more. To sign up, go to [email or web address] . Click on \"Log in\" on the left side of the screen, which will take you to the Welcome page. Then click on \"Sign up Now\" on the right side of the page.     You will be asked to enter the access code listed below, as well as some personal information. Please follow the directions to create your username and password.     Your access code is: [email or web address]  Expires: 2019  7:31 AM     Your access code will  in 90 days. If you need help or a new code, please call your [facility] clinic or [phone].        Care EveryWhere ID     This is your Care EveryWhere ID. This could be used by other organizations to access your [facility] medical records  [id number]        Equal Access to Services     [facility] CenterANNITA AH: Hadii joce dubois hadasho Sophillipali, waaxda luqadaha, qaybta kaalmada adeegyada, dionne sabillon . So [facility] [phone].    ATENCIÓN: Si habla español, tiene a golden disposición servicios gratuitos de asistencia lingüística. Llame al [phone].    We comply with applicable federal civil rights laws and [place] laws. We do not discriminate on the basis of race, color, national origin, age, disability, sex, sexual orientation, or gender identity.               Review of your medicines      START taking        Dose / " Directions    apixaban ANTICOAGULANT 5 MG tablet   Commonly known as:  ELIQUIS        Dose:  5 mg   Take 1 tablet (5 mg) by mouth 2 times daily   Quantity:  60 tablet   Refills:  3       metoprolol succinate ER 50 MG 24 hr tablet   Commonly known as:  TOPROL-XL        Dose:  50 mg   Take 1 tablet (50 mg) by mouth daily   Quantity:  30 tablet   Refills:  11         CONTINUE these medicines which have NOT CHANGED        Dose / Directions    adalimumab 40 MG/0.8ML prefilled syringe kit   Commonly known as:  HUMIRA        Dose:  40 mg   Inject 40 mg Subcutaneous every 14 days   Refills:  0       cholecalciferol 1000 units Tabs        Take 1000 units by mouth alternating with 2000 units every other day   Refills:  0       folic acid 1 MG tablet   Commonly known as:  FOLVITE        Dose:  1 mg   Take 1 mg by mouth daily   Refills:  0       losartan 50 MG tablet   Commonly known as:  COZAAR   Used for:  Benign essential hypertension        Dose:  50 mg   Take 1 tablet (50 mg) by mouth daily   Quantity:  90 tablet   Refills:  3       nicotine 21-14-7 MG/24HR Kit   Used for:  Cigarette nicotine dependence without complication        Dose:  1 patch   Place 1 patch onto the skin daily Use 21 mg patch qd for 6 weeks, then 14 mg x 2 weeks, then 7 mg x 2 weeks   Quantity:  70 kit   Refills:  0       * varenicline 0.5 MG X 11 & 1 MG X 42 tablet   Commonly known as:  CHANTIX STARTING MONTH NARGIS   Used for:  Tobacco abuse        Take 0.5 mg tab daily for 3 days, then 0.5 mg tab twice daily for 4 days, then 1 mg twice daily.   Quantity:  53 tablet   Refills:  0       * varenicline 1 MG tablet   Commonly known as:  CHANTIX   Used for:  Tobacco abuse        Dose:  1 mg   Take 1 tablet (1 mg) by mouth 2 times daily   Quantity:  60 tablet   Refills:  2       * Notice:  This list has 2 medication(s) that are the same as other medications prescribed for you. Read the directions carefully, and ask your doctor or other care provider to review  them with you.         Where to get your medicines      These medications were sent to [facility] [address] [place], [place] - [address] [facility] [address] [address]     Phone:  [phone]     apixaban ANTICOAGULANT 5 MG tablet    metoprolol succinate ER 50 MG 24 hr tablet                Protect others around you: Learn how to safely use, store and throw away your medicines at www.disposemymeds.org.             Medication List: This is a list of all your medications and when to take them. Check marks below indicate your daily home schedule. Keep this list as a reference.      Medications           Morning Afternoon Evening Bedtime As Needed    adalimumab 40 MG/0.8ML prefilled syringe kit   Commonly known as:  HUMIRA   Inject 40 mg Subcutaneous every 14 days                                apixaban ANTICOAGULANT 5 MG tablet   Commonly known as:  ELIQUIS   Take 1 tablet (5 mg) by mouth 2 times daily                                cholecalciferol 1000 units Tabs   Take 1000 units by mouth alternating with 2000 units every other day                                folic acid 1 MG tablet   Commonly known as:  FOLVITE   Take 1 mg by mouth daily                                losartan 50 MG tablet   Commonly known as:  COZAAR   Take 1 tablet (50 mg) by mouth daily                                metoprolol succinate ER 50 MG 24 hr tablet   Commonly known as:  TOPROL-XL   Take 1 tablet (50 mg) by mouth daily                                nicotine 21-14-7 MG/24HR Kit   Place 1 patch onto the skin daily Use 21 mg patch qd for 6 weeks, then 14 mg x 2 weeks, then 7 mg x 2 weeks                                * varenicline 0.5 MG X 11 & 1 MG X 42 tablet   Commonly known as:  CHANTIX STARTING MONTH NARGIS   Take 0.5 mg tab daily for 3 days, then 0.5 mg tab twice daily for 4 days, then 1 mg twice daily.                                * varenicline 1 MG tablet   Commonly known as:  CHANTIX   Take 1  tablet (1 mg) by mouth 2 times daily                                * Notice:  This list has 2 medication(s) that are the same as other medications prescribed for you. Read the directions carefully, and ask your doctor or other care provider to review them with you.

## 2018-11-29 NOTE — H&P
"[facility]    History and Physical  Hospitalist       Date of Admission:  11/29/2018    Assessment & Plan   Isabel Talbot is a 53 year old female who presents with palpitations.    Principal Problem:    Atrial fibrillation with RVR (H)    Assessment: Patient has a history of paroxysmal A. fib but this has been persistent for the last 48 hours.    Plan: Admit to ICU, monitor on amiodarone drip.  We will plan to stop amiodarone drip and observe.  Will treat with IV diltiazem or IV metoprolol if needed.  Start Eliquis.      Rheumatoid arthritis, involving unspecified site, unspecified rheumatoid factor presence (H)    Assessment: chronic       Paroxysmal atrial fibrillation (H)    Assessment: as above        Collagenous colitis      Benign essential hypertension    Assessment: on losartan      DVT Prophylaxis: eliquis    Code Status: Full Code    Quincy Martin    Primary Care Physician   Bakari Guillen    Chief Complaint   palpitations    History is obtained from the patient and chart review.    History of Present Illness   Isabel Talbot is a 53 year old female who presents with 36 hours of palpitations and heart irregularities.  She knows the time that this started 2 nights ago because it woke her up from sleep.  She has had intermittent palpitations and some dyspnea on exertion.  She has no chest pain.  She has had a history of paroxysmal A. fib in the past and has Toprol to use as needed.  Due to a \"racing heart\" she took 2 doses, or 100 mg this morning with no improvement.  She presented to the emergency department in atrial fibrillation with rapid ventricular response with a heart rate of the 130s.  Her blood pressure was 100 systolic, in the ED provider gave her an amiodarone bolus and started a loading dose of amiodarone IV.  When I see her she is comfortable with a heart rate in the 70s, continued A. fib.  She has no complaints at this time.    Past Medical History    I have " reviewed this patient's medical history and updated it with pertinent information if needed.   Past Medical History:   Diagnosis Date     Benign essential hypertension 3/13/2018     Encounter for tobacco use cessation counseling 3/13/2018     Hypovitaminosis D 3/1/2018     Paroxysmal atrial fibrillation (H) 3/1/2018     Rheumatoid arthritis, involving unspecified site, unspecified rheumatoid factor presence (H) 3/1/2018       Past Surgical History   I have reviewed this patient's surgical history and updated it with pertinent information if needed.  Past Surgical History:   Procedure Laterality Date     COLONOSCOPY N/A 4/2/2018    F/U 2028     HAND SURGERY      s/p fall - age 19 or 20       Prior to Admission Medications   Prior to Admission Medications   Prescriptions Last Dose Informant Patient Reported? Taking?   adalimumab (HUMIRA) 40 MG/0.8ML prefilled syringe kit not yet at not yet Other Yes Yes   Sig: Inject 40 mg Subcutaneous every 14 days   cholecalciferol 1000 units TABS 11/28/2018 at 0630  Yes Yes   Sig: Take 1000 units by mouth alternating with 2000 units every other day   folic acid (FOLVITE) 1 MG tablet 11/28/2018 at 0630  Yes Yes   Sig: Take 1 mg by mouth daily    losartan (COZAAR) 50 MG tablet 11/29/2018 at 0630  No Yes   Sig: Take 1 tablet (50 mg) by mouth daily   nicotine 21-14-7 MG/24HR KIT More than a month at Unknown time  No No   Sig: Place 1 patch onto the skin daily Use 21 mg patch qd for 6 weeks, then 14 mg x 2 weeks, then 7 mg x 2 weeks   Patient not taking: Reported on 8/8/2018   varenicline (CHANTIX STARTING MONTH PAK) 0.5 MG X 11 & 1 MG X 42 tablet More than a month at Unknown time  No No   Sig: Take 0.5 mg tab daily for 3 days, then 0.5 mg tab twice daily for 4 days, then 1 mg twice daily.   varenicline (CHANTIX) 1 MG tablet More than a month at Unknown time  No No   Sig: Take 1 tablet (1 mg) by mouth 2 times daily      Facility-Administered Medications: None     Allergies   Allergies    Allergen Reactions     Ace Inhibitors Cough     Metoprolol Other (See Comments)     Low heart rate       Social History   I have reviewed this patient's social history and updated it with pertinent information if needed. Isabel Talbot  reports that she has been smoking Cigarettes.  She has a 19.00 pack-year smoking history. She has never used smokeless tobacco. She reports that she drinks alcohol. She reports that she does not use illicit drugs.    Family History   I have reviewed this patient's family history and updated it with pertinent information if needed.   Family History   Problem Relation Age of Onset     Cancer Mother      throat, tonsillar cancer     Heart Disease Father      Hypertension Father      Hyperlipidemia Father        Review of Systems     REVIEW OF SYSTEMS:    Constitutional: normal energy and appetite, no recent sick contacts  Eyes: no changes in vision  Ears, nose, mouth, throat, and face: no mouth sores, dysphagia, or odynophagia  Respiratory: no shortness of breath, cough, or wheezing. No aspiration symptoms.   Cardiovascular: no chest pain, palpitations, orthopnea, increased lower extremity edema, or syncope.   Gastrointestinal: no constipation, diarrhea, nausea, vomiting or abdominal pain.  Genitourinary: no dysuria, hematuria, urgency or frequency.   Hematologic/lymphatic: no unintentional weight loss or night sweats.  Musculoskeletal: no pain to extremities or falls.   Neurological: no new weakness, tingling, numbness.   Psychiatric: no hallucinations or delusions.  Endocrine:  not a known diabetic.       Physical Exam   Temp: 97.6  F (36.4  C) Temp src: Tympanic BP: 126/78 Pulse: 78 Heart Rate: 75 Resp: 17 SpO2: 96 % O2 Device: None (Room air)    Vital Signs with Ranges  Temp:  [97.6  F (36.4  C)-98.2  F (36.8  C)] 97.6  F (36.4  C)  Pulse:  [] 78  Heart Rate:  [] 75  Resp:  [14-35] 17  BP: ()/() 126/78  SpO2:  [69 %-100 %] 96 %  138 lbs .13  oz    Constitutional: In no apparent distress  Eyes: pupils reactive, extraocular movements intact. Anicteric sclera.   HEENT: TM's normal, oropharynx nonerythematous. Neck supple, no JVD.  Respiratory: no crackles noted, no wheezes.  Cardiovascular: irregular, no murmur. No lower extremity edema.  GI: soft, non-tender, bowel sounds present.  Lymph/Hematologic: no cervical or supraclavicular LAD.  Genitourinary: deferred  Skin: no rashes, or sores  Musculoskeletal: no joint erythema or swelling  Neurologic: cranial nerves symmetric. Neuro exam nonfocal  Psychiatric: alert and oriented x3. Interactive.       Data   Data reviewed today:  I personally reviewed EKG showing atrial fibrillation a rapid ventricular response and a chest xray showing no infiltrate or edema.    Recent Labs  Lab 11/29/18  1125   WBC 5.4   HGB 12.9   MCV 93         POTASSIUM 4.0   CHLORIDE 108*   CO2 25   BUN 14   CR 0.86   ANIONGAP 6   OTLIIA 9.4      ALBUMIN 3.7   PROTTOTAL 6.6   BILITOTAL 0.5   ALKPHOS 55   ALT 13   AST 15   TROPI <0.030       Recent Results (from the past 24 hour(s))   XR Chest Port 1 View    Narrative    PROCEDURE: XR CHEST PORT 1 VW 11/29/2018 11:39 AM    HISTORY: SOB;     COMPARISONS: 6/28/2018.    TECHNIQUE: Single view.    FINDINGS: Heart and pulmonary vasculature are normal. No acute  infiltrate or effusion is seen.         Impression    IMPRESSION: No acute disease.    SARY SANTO MD

## 2018-11-29 NOTE — PLAN OF CARE
Problem: Arrhythmia/Dysrhythmia (Symptomatic) (Adult)  Goal: Signs and Symptoms of Listed Potential Problems Will be Absent, Minimized or Managed (Arrhythmia/Dysrhythmia)  Signs and symptoms of listed potential problems will be absent, minimized or managed by discharge/transition of care (reference Arrhythmia/Dysrhythmia (Symptomatic) (Adult) CPG).   Outcome: Therapy, progress toward functional goals as expected  Per MD stop drip and fluids and saline lock. Patient's heart rate 60s-80s, patient denies chest pain or palpitations, patient is still in a-fib and has an appointment in [place] tomorrow morning.

## 2018-11-29 NOTE — ED PROVIDER NOTES
History   No chief complaint on file.    HPI  Isabel Talbot is a 53 year old female with a history of A. fib not on anticoagulants and also has hypertension presenting to the emergency room with complaints of irregular heart rates, heart racing associated with shortness of breath.  Patient reports that she was woken up from sleep 2 nights ago because of heart racing.  Since then she has had intermittent heart racing with associated shortness of breath on exertion.  She denies shortness of breath at rest.  She reports substernal chest pain which she describes as squeezing and sometimes sharp which usually a cause on exertion but not at rest.  She denies leg swelling, she does have associated dizziness with heart racing.  She denies dizziness at this time.  She denies chest pain at this time.  She reports mild shortness of breath at rest laying down at an incline position.  Her only blood pressure medication is losartan 50 mg daily.  She does have a prescription for metoprolol 50 mg and has been instructed that she should take this medication when she does have a feeling of heart racing. She reports that she took this medication 2 doses,1 at 6 AM and 1 at 8 AM this morning but did not resolve her symptoms and she came to the emergency room.  She does have a low normal blood pressure with a heart rate in the 130s.  EKG done on arrival reveals atrial fibrillation with rapid ventricular response at the rate of 138 bpm.  She continues to smoke half a pack of cigarettes daily, she drinks alcohol occasionally, her last drink was 2 days ago.    Problem List:    Patient Active Problem List    Diagnosis Date Noted     Bradycardia 06/28/2018     Priority: Medium     Chest pain 06/28/2018     Priority: Medium     Anemia, unspecified type 03/13/2018     Priority: Medium     Benign essential hypertension 03/13/2018     Priority: Medium     Encounter for tobacco use cessation counseling 03/13/2018     Priority: Medium      Rheumatoid arthritis, involving unspecified site, unspecified rheumatoid factor presence (H) 03/01/2018     Priority: Medium     Paroxysmal atrial fibrillation (H) 03/01/2018     Priority: Medium     Hypovitaminosis D 03/01/2018     Priority: Medium     Collagenous colitis 03/01/2018     Priority: Medium     Incontinence of feces, unspecified fecal incontinence type 03/01/2018     Priority: Medium        Past Medical History:    Past Medical History:   Diagnosis Date     Benign essential hypertension 3/13/2018     Encounter for tobacco use cessation counseling 3/13/2018     Hypovitaminosis D 3/1/2018     Paroxysmal atrial fibrillation (H) 3/1/2018     Rheumatoid arthritis, involving unspecified site, unspecified rheumatoid factor presence (H) 3/1/2018       Past Surgical History:    Past Surgical History:   Procedure Laterality Date     COLONOSCOPY N/A 4/2/2018    F/U 2028     HAND SURGERY      s/p fall - age 19 or 20       Family History:    Family History   Problem Relation Age of Onset     Cancer Mother      throat, tonsillar cancer     Heart Disease Father      Hypertension Father      Hyperlipidemia Father        Social History:  Marital Status:  Single [1]  Social History   Substance Use Topics     Smoking status: Current Every Day Smoker     Packs/day: 0.50     Years: 38.00     Types: Cigarettes     Smokeless tobacco: Never Used     Alcohol use Yes      Comment: occasional.        Medications:      Aspirin-Acetaminophen-Caffeine (GOODYS EXTRA STRENGTH) 500-325-65 MG PACK   budesonide (ENTOCORT EC) 3 MG EC capsule   cholecalciferol 1000 units TABS   clotrimazole (LOTRIMIN) 1 % cream   folic acid (FOLVITE) 1 MG tablet   losartan (COZAAR) 50 MG tablet   nicotine 21-14-7 MG/24HR KIT   triamcinolone (KENALOG) 0.1 % ointment   varenicline (CHANTIX STARTING MONTH PAK) 0.5 MG X 11 & 1 MG X 42 tablet   varenicline (CHANTIX) 1 MG tablet         Review of Systems   Constitutional: Negative for chills and fever.   HENT:  Negative for congestion.    Eyes: Negative for visual disturbance.   Respiratory: Positive for shortness of breath. Negative for cough.    Cardiovascular: Positive for palpitations. Negative for chest pain and leg swelling.   Gastrointestinal: Negative for nausea and vomiting.   Neurological: Negative for dizziness, weakness, light-headedness and headaches.       Physical Exam   BP: 131/55  Pulse: 100 (very irregular)  Resp: 14  Weight: 62.1 kg (137 lb)  SpO2: 100 %      Physical Exam   Constitutional: She is oriented to person, place, and time. She appears well-developed. No distress.   HENT:   Head: Normocephalic and atraumatic.   Eyes: Pupils are equal, round, and reactive to light.   Neck: Normal range of motion. No JVD present.   Pulsating carotid artery.   Cardiovascular:   Tachycardia with regular regular heart rhythm.  Normal S1 and S2 heart sounds.   Pulmonary/Chest: Effort normal and breath sounds normal. No respiratory distress.   Musculoskeletal: Normal range of motion. She exhibits no edema or deformity.   Neurological: She is alert and oriented to person, place, and time. No cranial nerve deficit. Coordination normal.   Skin: She is not diaphoretic.       ED Course     ED Course     Procedures               EKG Interpretation:      Interpreted by Karlene Smith  Time reviewed: 11:06m AM  Symptoms at time of EKG: Palpitation   Rhythm: atrial fibrillation - rapid  Rate: 138  Axis: Normal  Ectopy: none  Conduction: normal  ST Segments/ T Waves: Early repolarization  Q Waves: nonspecific  Comparison to prior: Unchanged    Clinical Impression: RVV and atrial fibrillation (chronic)    Critical Care time:  none  Results for orders placed or performed during the hospital encounter of 11/29/18 (from the past 24 hour(s))   CBC with platelets differential   Result Value Ref Range    WBC 5.4 4.0 - 11.0 10e9/L    RBC Count 4.07 3.8 - 5.2 10e12/L    Hemoglobin 12.9 11.7 - 15.7 g/dL    Hematocrit 37.7 35.0 - 47.0  %    MCV 93 78 - 100 fl    MCH 31.7 26.5 - 33.0 pg    MCHC 34.2 31.5 - 36.5 g/dL    RDW 12.8 10.0 - 15.0 %    Platelet Count 224 150 - 450 10e9/L    Diff Method Automated Method     % Neutrophils 49.4 %    % Lymphocytes 34.5 %    % Monocytes 10.4 %    % Eosinophils 3.5 %    % Basophils 2.0 %    % Immature Granulocytes 0.2 %    Absolute Neutrophil 2.7 1.6 - 8.3 10e9/L    Absolute Lymphocytes 1.9 0.8 - 5.3 10e9/L    Absolute Monocytes 0.6 0.0 - 1.3 10e9/L    Absolute Eosinophils 0.2 0.0 - 0.7 10e9/L    Absolute Basophils 0.1 0.0 - 0.2 10e9/L    Abs Immature Granulocytes 0.0 0 - 0.4 10e9/L   Comprehensive metabolic panel   Result Value Ref Range    Sodium 139 134 - 144 mmol/L    Potassium 4.0 3.5 - 5.1 mmol/L    Chloride 108 (H) 98 - 107 mmol/L    Carbon Dioxide 25 21 - 31 mmol/L    Anion Gap 6 3 - 14 mmol/L    Glucose 103 70 - 105 mg/dL    Urea Nitrogen 14 7 - 25 mg/dL    Creatinine 0.86 0.60 - 1.20 mg/dL    GFR Estimate 69 >60 mL/min/1.7m2    GFR Estimate If Black 83 >60 mL/min/1.7m2    Calcium 9.4 8.6 - 10.3 mg/dL    Bilirubin Total 0.5 0.3 - 1.0 mg/dL    Albumin 3.7 3.5 - 5.7 g/dL    Protein Total 6.6 6.4 - 8.9 g/dL    Alkaline Phosphatase 55 34 - 104 U/L    ALT 13 7 - 52 U/L    AST 15 13 - 39 U/L   Troponin I   Result Value Ref Range    Troponin I ES <0.030 0.000 - 0.034 ug/L   Nt probnp inpatient (BNP)   Result Value Ref Range    N-Terminal Pro BNP Inpatient 950 (H) 0 - 100 pg/mL   XR Chest Port 1 View    Narrative    PROCEDURE: XR CHEST PORT 1 VW 11/29/2018 11:39 AM    HISTORY: SOB;     COMPARISONS: 6/28/2018.    TECHNIQUE: Single view.    FINDINGS: Heart and pulmonary vasculature are normal. No acute  infiltrate or effusion is seen.         Impression    IMPRESSION: No acute disease.    SARY SANTO MD       Medications   amiodarone (NEXTERONE) 360 mg in D5W 200 mL infusion (1 mg/min Intravenous New Bag 11/29/18 1150)   amiodarone (NEXTERONE) bolus 150 mg (150 mg Intravenous Given 11/29/18 1128)     12:15  PM  Patient seen and reevaluated.  She denies shortness of breath or chest pain at this time.  She has received a bolus of amiodarone and the drip is currently running.  Her rate is now controlled down to the 70s however on the monitor she continues to go in to normal sinus rhythm as well as A. fib intermittently.  Her labs are revealed elevated BNP which is likely due to A. fib with RVR.  X-ray was normal without any signs of pulmonary edema or pleural effusion.  She will be admitted to complete uterine drip and possibly will be converted to amiodarone tablets.  She will require an echocardiogram and anticoagulant.  I have spoken to Dr. Martin, the hospitalist and patient will be admitted to the ICU.    /83  Pulse 100  Resp 14  Wt 62.1 kg (137 lb)  SpO2 99%  BMI 22.8 kg/m2      Assessments & Plan (with Medical Decision Making)   Patient is a 53-year-old female with a history of A. fib presenting to the emergency room with symptoms of palpitation, shortness of breath dizziness and substernal chest pain.  EKG reviewed A. fib with rapid ventricular response.  She did have a low normal blood pressure because she took 20 mg of metoprolol before arriving.  She received amiodarone bolus and drip currently running and the rate is now controlled however she still has iridium that goes into A. fib and back to normal sinus rhythm.  She will be admitted to the ICU for rate control and possibly anticoagulation subsequently after an echocardiogram.    I have reviewed the nursing notes.    I have reviewed the findings, diagnosis, plan and need for follow up with the patient.       ULFAZ-8-Bsuj Score  (calculator)  Background  Calculates overall risk of atrial fibrillation related CVA based on 7 factors: Age>=65-75, CHF, Htn, CVA/TIA, DM, vascular disease, female  Data  53 year old year old female  has Rheumatoid arthritis, involving unspecified site, unspecified rheumatoid factor presence (H); Paroxysmal atrial  fibrillation (H); Hypovitaminosis D; Collagenous colitis; Incontinence of feces, unspecified fecal incontinence type; Anemia, unspecified type; Benign essential hypertension; Encounter for tobacco use cessation counseling; Bradycardia; and Chest pain on her problem list.  Criteria   Of possible 6 points for 5 possible items  1 point: Hypertension  1 point: Vascular Disease (cigarette smoker)  1 point: Female    Interpretation  ARMME-8-Ejiz Score: 3  CHADS Score 2+ (CVA risk >5% per year): Warfarin with goal INR 2.0 to 3.0      Current Discharge Medication List          Final diagnoses:   Atrial fibrillation with RVR (H)       11/29/2018   [facility]     Karlene Smith MD  11/29/18 9854

## 2018-11-29 NOTE — PROGRESS NOTES
Pt admitted to ICU room 916 for amiodarone gtt and cardiac monitoring. Pt AAOx4, independent. Denies chest pain, rates maintained at 80-90s on gtt. BPs stable. PIV intact. Oriented to room, call light given. No questions at this time.   Jordana Wallace RN on 11/29/2018 at 2:43 PM

## 2018-11-29 NOTE — IP AVS SNAPSHOT
[facility]    [address]    [place] [address]    Phone:  [phone]    Fax:  [phone]                                       After Visit Summary   11/29/2018    Isabel Talbot    MRN: [id number]           After Visit Summary Signature Page     I have received my discharge instructions, and my questions have been answered. I have discussed any challenges I see with this plan with the nurse or doctor.    ..........................................................................................................................................  Patient/Patient Representative Signature      ..........................................................................................................................................  Patient Representative Print Name and Relationship to Patient    ..................................................               ................................................  Date                                   Time    ..........................................................................................................................................  Reviewed by Signature/Title    ...................................................              ..............................................  Date                                               Time          22EPIC Rev 08/18

## 2018-11-29 NOTE — PHARMACY-ADMISSION MEDICATION HISTORY
Pharmacy -- Admission Medication Reconciliation    Prior to admission (PTA) medications were reviewed and the patient's PTA medication list was updated.    Sources Consulted: [facility], [employer], Patient    The reliability of this Medication Reconciliation is: Reliability: Reliable    The following significant changes were made:  Removed aspirin-acetaminophen-caffeine  Removed budesonide  Removed clotrimazole  Removed triamcinolone  Confirmed NOT taking leflunomide  Added Humira- it has been ordered and filled but insurance requires $200 copay, patient has not picked up yet due to cost  Patient stopped nicotine and chantix due to father's illness and recent death- she would like to restart one of these after the holidays    In addition, the patient's allergies were reviewed with the patient and updated as follows:   Allergies: Ace inhibitors and Metoprolol    The pharmacist has reviewed with the patient that all personal medications should be removed from the building or locked in the belongings safe.  Patient shall only take medications ordered by the physician and administered by the nursing staff.       Medication barriers identified: cost,    Medication adherence concerns: yes, due to family stress/family loss   Understanding of emergency medications: n/a    Vera Beard [facility], 11/29/2018,  5:00 PM

## 2018-11-29 NOTE — ED TRIAGE NOTES
ED Nursing Triage Note (General)   ________________________________    Isabel Talbot is a 53 year old Female that presents to triage private car  With history of afib intermittently.  She has felt her heart pounding and racing very irregular for the past 36 hours.  She reports nausea, dizzy.   reported by patient .  /55  Pulse 100  Resp 14  Wt 62.1 kg (137 lb)  SpO2 100%  BMI 22.8 kg/m2t  Patient appears alert  and oriented, in moderate distress., and cooperative and pleasant behavior.  GCS Total = 15  Airway: intact  Breathing noted as Normal and nonlabored.  Circulation Abnormal and irregular.  Strong radial pulse.  Skin cap less than 3 seconds on arms.  Cool, pale fingers        PRE HOSPITAL PRIOR LIVING SITUATION Significant Other

## 2018-11-30 VITALS
RESPIRATION RATE: 20 BRPM | SYSTOLIC BLOOD PRESSURE: 130 MMHG | WEIGHT: 138.01 LBS | TEMPERATURE: 97.3 F | BODY MASS INDEX: 22.97 KG/M2 | DIASTOLIC BLOOD PRESSURE: 100 MMHG | HEART RATE: 110 BPM | OXYGEN SATURATION: 95 %

## 2018-11-30 LAB
ANION GAP SERPL CALCULATED.3IONS-SCNC: 5 MMOL/L (ref 3–14)
BUN SERPL-MCNC: 14 MG/DL (ref 7–25)
CALCIUM SERPL-MCNC: 9 MG/DL (ref 8.6–10.3)
CHLORIDE SERPL-SCNC: 110 MMOL/L (ref 98–107)
CO2 SERPL-SCNC: 25 MMOL/L (ref 21–31)
CREAT SERPL-MCNC: 0.9 MG/DL (ref 0.6–1.2)
ERYTHROCYTE [DISTWIDTH] IN BLOOD BY AUTOMATED COUNT: 12.9 % (ref 10–15)
GFR SERPL CREATININE-BSD FRML MDRD: 65 ML/MIN/1.7M2
GLUCOSE SERPL-MCNC: 90 MG/DL (ref 70–105)
HCT VFR BLD AUTO: 36.5 % (ref 35–47)
HGB BLD-MCNC: 12.4 G/DL (ref 11.7–15.7)
MCH RBC QN AUTO: 31.2 PG (ref 26.5–33)
MCHC RBC AUTO-ENTMCNC: 34 G/DL (ref 31.5–36.5)
MCV RBC AUTO: 92 FL (ref 78–100)
PLATELET # BLD AUTO: 189 10E9/L (ref 150–450)
POTASSIUM SERPL-SCNC: 3.7 MMOL/L (ref 3.5–5.1)
RBC # BLD AUTO: 3.98 10E12/L (ref 3.8–5.2)
SODIUM SERPL-SCNC: 140 MMOL/L (ref 134–144)
WBC # BLD AUTO: 5.5 10E9/L (ref 4–11)

## 2018-11-30 PROCEDURE — 99239 HOSP IP/OBS DSCHRG MGMT >30: CPT | Performed by: INTERNAL MEDICINE

## 2018-11-30 PROCEDURE — 25000132 ZZH RX MED GY IP 250 OP 250 PS 637: Performed by: INTERNAL MEDICINE

## 2018-11-30 PROCEDURE — 85027 COMPLETE CBC AUTOMATED: CPT | Performed by: INTERNAL MEDICINE

## 2018-11-30 PROCEDURE — 80048 BASIC METABOLIC PNL TOTAL CA: CPT | Performed by: INTERNAL MEDICINE

## 2018-11-30 PROCEDURE — 36415 COLL VENOUS BLD VENIPUNCTURE: CPT | Performed by: INTERNAL MEDICINE

## 2018-11-30 RX ORDER — METOPROLOL SUCCINATE 50 MG/1
50 TABLET, EXTENDED RELEASE ORAL DAILY
Qty: 30 TABLET | Refills: 11 | Status: SHIPPED | OUTPATIENT
Start: 2018-11-30 | End: 2018-12-18

## 2018-11-30 RX ORDER — METOPROLOL SUCCINATE 50 MG/1
50 TABLET, EXTENDED RELEASE ORAL DAILY
Status: DISCONTINUED | OUTPATIENT
Start: 2018-11-30 | End: 2018-11-30 | Stop reason: HOSPADM

## 2018-11-30 RX ADMIN — LOSARTAN POTASSIUM 50 MG: 50 TABLET, FILM COATED ORAL at 07:44

## 2018-11-30 RX ADMIN — APIXABAN 5 MG: 5 TABLET, FILM COATED ORAL at 07:44

## 2018-11-30 RX ADMIN — METOPROLOL SUCCINATE 50 MG: 50 TABLET, EXTENDED RELEASE ORAL at 07:45

## 2018-11-30 ASSESSMENT — ACTIVITIES OF DAILY LIVING (ADL)
ADLS_ACUITY_SCORE: 12

## 2018-11-30 NOTE — DISCHARGE SUMMARY
[facility]    Discharge Summary  Hospitalist    Date of Admission:  11/29/2018  Date of Discharge:  11/30/2018  Discharging Provider: Quincy Martin  Date of Service (when I saw the patient): 11/30/18    Discharge Diagnoses   Principal Problem:    Atrial fibrillation with RVR (H) (11/29/2018)  Active Problems:    Rheumatoid arthritis, involving unspecified site, unspecified rheumatoid factor presence (H) (3/1/2018)    Paroxysmal atrial fibrillation (H) (3/1/2018)    Collagenous colitis (3/1/2018)    Benign essential hypertension (3/13/2018)      History of Present Illness   Isabel Talbot is an 53 year old female who presented with palpitations.    Hospital Course   Isabel Talbot was admitted on 11/29/2018.  The following problems were addressed during her hospitalization:    Atrial fibrillation with rapid ventricular response   Patient was rate controlled in A. fib on amiodarone in the emergency department when I saw her.  The amiodarone drip was discontinued and she was observed overnight with no significant increases in her heart rate.  At the time of discharge her heart rate was in the 100-110 range.  She was started on Toprol-XL 50 mg daily as well as Eliquis 5 mg twice daily.  She will follow-up with Dr. Guillen to ensure her rate remains controlled.  Consider cardioversion in 3 weeks.    Quincy Martin MD      Code Status   Full Code       Primary Care Physician   Bakari Guillen    Physical Exam   Temp: 97.3  F (36.3  C) Temp src: Tympanic BP: 120/74 Pulse: 78 Heart Rate: 102 Resp: 20 SpO2: 95 % O2 Device: None (Room air)    Vitals:    11/29/18 1056 11/29/18 1415 11/30/18 0400   Weight: 62.1 kg (137 lb) 62.6 kg (138 lb 0.1 oz) 62.6 kg (138 lb 0.1 oz)     Vital Signs with Ranges  Temp:  [97.3  F (36.3  C)-98.2  F (36.8  C)] 97.3  F (36.3  C)  Pulse:  [] 78  Heart Rate:  [] 102  Resp:  [12-35] 20  BP: ()/() 120/74  SpO2:  [69 %-100 %] 95 %  I/O last 3  completed shifts:  In: 360 [I.V.:360]  Out: -     GENERAL: Comfortable, no apparent distress.  CARDIOVASCULAR: regular rate and rhythm, no murmur. No lower extremity edema   RESPIRATORY: Clear to auscultation bilaterally, no wheezes or crackles.  GI: non-tender, non-distended, normal bowel sounds.   SKIN: warm periphery, no rashes      Discharge Disposition   Discharged to home  Condition at discharge: Stable    Consultations This Hospital Stay   None    Time Spent on this Encounter   IQuincy, personally saw the patient today and spent greater than 30 minutes discharging this patient.    Discharge Orders     Reason for your hospital stay   Atrial fibrillation     Follow-up and recommended labs and tests   Follow up with Dr. Bakari Guillen  In next 1-2 weeks. We will call you     Activity   Your activity upon discharge: activity as tolerated     Full Code     Diet   Follow this diet upon discharge: Orders Placed This Encounter     Combination Diet Regular Diet Adult         Discharge Medications   Current Discharge Medication List      START taking these medications    Details   apixaban ANTICOAGULANT (ELIQUIS) 5 MG tablet Take 1 tablet (5 mg) by mouth 2 times daily  Qty: 60 tablet, Refills: 3    Associated Diagnoses: Atrial fibrillation with RVR (H)      metoprolol succinate ER (TOPROL-XL) 50 MG 24 hr tablet Take 1 tablet (50 mg) by mouth daily  Qty: 30 tablet, Refills: 11    Associated Diagnoses: Atrial fibrillation with RVR (H)         CONTINUE these medications which have NOT CHANGED    Details   adalimumab (HUMIRA) 40 MG/0.8ML prefilled syringe kit Inject 40 mg Subcutaneous every 14 days      cholecalciferol 1000 units TABS Take 1000 units by mouth alternating with 2000 units every other day      folic acid (FOLVITE) 1 MG tablet Take 1 mg by mouth daily       losartan (COZAAR) 50 MG tablet Take 1 tablet (50 mg) by mouth daily  Qty: 90 tablet, Refills: 3    Associated Diagnoses: Benign essential  hypertension      nicotine 21-14-7 MG/24HR KIT Place 1 patch onto the skin daily Use 21 mg patch qd for 6 weeks, then 14 mg x 2 weeks, then 7 mg x 2 weeks  Qty: 70 kit, Refills: 0    Associated Diagnoses: Cigarette nicotine dependence without complication      varenicline (CHANTIX STARTING MONTH PAK) 0.5 MG X 11 & 1 MG X 42 tablet Take 0.5 mg tab daily for 3 days, then 0.5 mg tab twice daily for 4 days, then 1 mg twice daily.  Qty: 53 tablet, Refills: 0    Associated Diagnoses: Tobacco abuse      varenicline (CHANTIX) 1 MG tablet Take 1 tablet (1 mg) by mouth 2 times daily  Qty: 60 tablet, Refills: 2    Associated Diagnoses: Tobacco abuse           Allergies   Allergies   Allergen Reactions     Ace Inhibitors Cough     Metoprolol Other (See Comments)     Low heart rate     Data   Most Recent 3 CBC's:  Recent Labs   Lab Test  11/30/18   0408  11/29/18   1125  06/28/18   0905   WBC  5.5  5.4  5.9   HGB  12.4  12.9  12.3   MCV  92  93  93   PLT  189  224  219      Most Recent 3 BMP's:  Recent Labs   Lab Test  11/30/18   0408  11/29/18   1125  06/28/18   0905   NA  140  139  140   POTASSIUM  3.7  4.0  3.6   CHLORIDE  110*  108*  108*   CO2  25  25  25   BUN  14  14  18   CR  0.90  0.86  0.69   ANIONGAP  5  6  7   OTILIA  9.0  9.4  9.4   GLC  90  103  92     Most Recent 2 LFT's:  Recent Labs   Lab Test  11/29/18   1125  06/28/18   0905   AST  15  25   ALT  13  40   ALKPHOS  55  50   BILITOTAL  0.5  0.4     Most Recent INR's and Anticoagulation Dosing History:  Anticoagulation Dose History     There is no flowsheet data to display.        Most Recent 3 Troponin's:  Recent Labs   Lab Test  11/29/18   1125  06/28/18   1506  06/28/18   0905   TROPI  <0.030  <0.030  <0.030     Most Recent Cholesterol Panel:  Recent Labs   Lab Test  03/01/18   0911   CHOL  179   LDL  114*   HDL  54   TRIG  57     Most Recent 6 Bacteria Isolates From Any Culture (See EPIC Reports for Culture Details):  Recent Labs   Lab Test  03/02/18   0729    CULT  No Salmonella, Shigella, E. coli O157, Aeromonas or Plesiomonas isolated     Most Recent TSH, T4 and A1c Labs:No lab results found.  Results for orders placed or performed during the hospital encounter of 11/29/18   XR Chest Port 1 View    Narrative    PROCEDURE: XR CHEST PORT 1 VW 11/29/2018 11:39 AM    HISTORY: SOB;     COMPARISONS: 6/28/2018.    TECHNIQUE: Single view.    FINDINGS: Heart and pulmonary vasculature are normal. No acute  infiltrate or effusion is seen.         Impression    IMPRESSION: No acute disease.    SARY SANTO MD

## 2018-11-30 NOTE — PHARMACY - DISCHARGE MEDICATION RECONCILIATION AND EDUCATION
[facility]  Part of [address] [address]    November 30, 2018    Dear Pharmacist,    Your customer, Isabel Talbot, born on 1965, was recently discharged from [facility].  We have updated her medication list and want to alert you to the following:       Review of your medicines      START taking       Dose / Directions    apixaban ANTICOAGULANT 5 MG tablet   Commonly known as:  ELIQUIS   Used for:  Atrial fibrillation with RVR (H)        Dose:  5 mg   Take 1 tablet (5 mg) by mouth 2 times daily   Quantity:  60 tablet   Refills:  3       metoprolol succinate ER 50 MG 24 hr tablet   Commonly known as:  TOPROL-XL   Used for:  Atrial fibrillation with RVR (H)        Dose:  50 mg   Take 1 tablet (50 mg) by mouth daily   Quantity:  30 tablet   Refills:  11         CONTINUE these medicines which have NOT CHANGED       Dose / Directions    adalimumab 40 MG/0.8ML prefilled syringe kit   Commonly known as:  HUMIRA        Dose:  40 mg   Inject 40 mg Subcutaneous every 14 days   Refills:  0       cholecalciferol 1000 units Tabs        Take 1000 units by mouth alternating with 2000 units every other day   Refills:  0       folic acid 1 MG tablet   Commonly known as:  FOLVITE        Dose:  1 mg   Take 1 mg by mouth daily   Refills:  0       losartan 50 MG tablet   Commonly known as:  COZAAR   Used for:  Benign essential hypertension        Dose:  50 mg   Take 1 tablet (50 mg) by mouth daily   Quantity:  90 tablet   Refills:  3       nicotine 21-14-7 MG/24HR Kit   Used for:  Cigarette nicotine dependence without complication        Dose:  1 patch   Place 1 patch onto the skin daily Use 21 mg patch qd for 6 weeks, then 14 mg x 2 weeks, then 7 mg x 2 weeks   Quantity:  70 kit   Refills:  0       * varenicline 0.5 MG X 11 & 1 MG X 42 tablet   Commonly known as:  CHANTIX STARTING MONTH PAK   Used for:  Tobacco abuse        Take 0.5 mg tab daily  for 3 days, then 0.5 mg tab twice daily for 4 days, then 1 mg twice daily.   Quantity:  53 tablet   Refills:  0       * varenicline 1 MG tablet   Commonly known as:  CHANTIX   Used for:  Tobacco abuse        Dose:  1 mg   Take 1 tablet (1 mg) by mouth 2 times daily   Quantity:  60 tablet   Refills:  2       * Notice:  This list has 2 medication(s) that are the same as other medications prescribed for you. Read the directions carefully, and ask your doctor or other care provider to review them with you.         Where to get your medicines      These medications were sent to [facility] [address] [place] [id number]     Phone:  [phone]      apixaban ANTICOAGULANT 5 MG tablet     metoprolol succinate ER 50 MG 24 hr tablet             We also reviewed Isabel Talbot's allergy list and updated it as needed:  Allergies: Ace inhibitors and Metoprolol    Thank you for continuing to care for Isabel Talbot.  We look forward to working together with you in the future.    Sincerely,  Vera Beard, [facility]

## 2018-11-30 NOTE — PHARMACY - DISCHARGE MEDICATION RECONCILIATION AND EDUCATION
Pharmacy:  Discharge Counseling and Medication Reconciliation    Isabel HUERTA Antione  [address] [place] [id number]  [phone] (home)   53 year old female  PCP: Bakari Guillen    Allergies: Ace inhibitors and Metoprolol    Discharge Counseling:    Pharmacist attempted to meet with patient today to review the medication portion of the After Visit Summary (with an emphasis on NEW medications) and to address patient's questions/concerns. Patient had been discharged.    Summary of Education: prepared handouts  Confirmed nursing staff, Jordana educated patient on new medications  Vera Beard notified physician that education was not done.    Materials Provided:  MedCounselor sheets printed from Clinical Pharmacology on: apixaban and metoprolol succinate, unable to give to patient, due to patient being discharged.    Discharge Medication Reconciliation:    Vera Beard [facility] has reviewed the patient's discharge medication orders and has compared them to the inpatient medication administration record and to what the patient was taking prior to admission - any discrepancies have been resolved.    It has been determined that the patient has an adequate supply of medications available or which can be obtained from the patient's preferred pharmacy, which she has confirmed as: [facility]. An updated medication list will be faxed to the patient's pharmacy.    Thank you for the consult.    Vera Beard RP........November 30, 2018 12:06 PM

## 2018-11-30 NOTE — PLAN OF CARE
Problem: Arrhythmia/Dysrhythmia (Symptomatic) (Adult)  Goal: Signs and Symptoms of Listed Potential Problems Will be Absent, Minimized or Managed (Arrhythmia/Dysrhythmia)  Signs and symptoms of listed potential problems will be absent, minimized or managed by discharge/transition of care (reference Arrhythmia/Dysrhythmia (Symptomatic) (Adult) CPG).   Outcome: Therapy, progress toward functional goals as expected  Patient slept well last night. Heart rate has been between 70-90 a-fib at rest. When patient gets up to bathroom heart rate will get up to 120-140 but will not sustain and comes down at rest. Patient denies palpitations or chest pain.

## 2018-11-30 NOTE — PROGRESS NOTES
Dr. Martin to [facility] and spoke with patient. Patient very anxious to go home. Discharge orders received.

## 2018-12-18 ENCOUNTER — OFFICE VISIT (OUTPATIENT)
Dept: INTERNAL MEDICINE | Facility: OTHER | Age: 53
End: 2018-12-18
Attending: INTERNAL MEDICINE
Payer: COMMERCIAL

## 2018-12-18 VITALS
HEART RATE: 52 BPM | TEMPERATURE: 97.1 F | WEIGHT: 140.13 LBS | SYSTOLIC BLOOD PRESSURE: 156 MMHG | BODY MASS INDEX: 23.32 KG/M2 | DIASTOLIC BLOOD PRESSURE: 84 MMHG

## 2018-12-18 DIAGNOSIS — R00.1 BRADYCARDIA: ICD-10-CM

## 2018-12-18 DIAGNOSIS — I48.91 ATRIAL FIBRILLATION WITH RVR (H): ICD-10-CM

## 2018-12-18 DIAGNOSIS — I48.0 PAROXYSMAL ATRIAL FIBRILLATION (H): ICD-10-CM

## 2018-12-18 DIAGNOSIS — Z09 HOSPITAL DISCHARGE FOLLOW-UP: Primary | ICD-10-CM

## 2018-12-18 DIAGNOSIS — K08.9 POOR DENTITION: ICD-10-CM

## 2018-12-18 PROBLEM — R07.9 CHEST PAIN: Status: RESOLVED | Noted: 2018-06-28 | Resolved: 2018-12-18

## 2018-12-18 PROCEDURE — 99214 OFFICE O/P EST MOD 30 MIN: CPT | Performed by: INTERNAL MEDICINE

## 2018-12-18 ASSESSMENT — ANXIETY QUESTIONNAIRES
3. WORRYING TOO MUCH ABOUT DIFFERENT THINGS: NOT AT ALL
IF YOU CHECKED OFF ANY PROBLEMS ON THIS QUESTIONNAIRE, HOW DIFFICULT HAVE THESE PROBLEMS MADE IT FOR YOU TO DO YOUR WORK, TAKE CARE OF THINGS AT HOME, OR GET ALONG WITH OTHER PEOPLE: NOT DIFFICULT AT ALL
6. BECOMING EASILY ANNOYED OR IRRITABLE: NOT AT ALL
GAD7 TOTAL SCORE: 0
1. FEELING NERVOUS, ANXIOUS, OR ON EDGE: NOT AT ALL
7. FEELING AFRAID AS IF SOMETHING AWFUL MIGHT HAPPEN: NOT AT ALL
2. NOT BEING ABLE TO STOP OR CONTROL WORRYING: NOT AT ALL
5. BEING SO RESTLESS THAT IT IS HARD TO SIT STILL: NOT AT ALL

## 2018-12-18 ASSESSMENT — ENCOUNTER SYMPTOMS
CHILLS: 0
NAUSEA: 0
DYSURIA: 0
COUGH: 0
LIGHT-HEADEDNESS: 0
WOUND: 0
WHEEZING: 0
SHORTNESS OF BREATH: 0
ARTHRALGIAS: 0
FEVER: 0
PALPITATIONS: 1
VOMITING: 0
MYALGIAS: 0
BRUISES/BLEEDS EASILY: 1
AGITATION: 0
DIARRHEA: 0
HEMATURIA: 0
DIZZINESS: 0
ABDOMINAL PAIN: 0
CONFUSION: 0

## 2018-12-18 ASSESSMENT — PATIENT HEALTH QUESTIONNAIRE - PHQ9
5. POOR APPETITE OR OVEREATING: NOT AT ALL
SUM OF ALL RESPONSES TO PHQ QUESTIONS 1-9: 0

## 2018-12-18 ASSESSMENT — PAIN SCALES - GENERAL: PAINLEVEL: MODERATE PAIN (4)

## 2018-12-18 NOTE — PROGRESS NOTES
"Nursing Notes:   Mallory Santana LPN  12/18/2018 10:52 AM  Signed  Patient presents to the clinic for hospital follow up.      Chief Complaint   Patient presents with     Hospital F/U       Initial There were no vitals taken for this visit. Estimated body mass index is 22.97 kg/m  as calculated from the following:    Height as of 7/3/18: 1.651 m (5' 5\").    Weight as of 11/30/18: 62.6 kg (138 lb 0.1 oz).  Medication Reconciliation: complete    Mallory Santana LPN    Nursing note reviewed with patient.  Accuracy and completeness verified.   Ms. Talbot is a 53 year old female who:  Patient presents with:  Hospital F/U      ICD-10-CM    1. Hospital discharge follow-up Z09    2. Paroxysmal atrial fibrillation (H) I48.0 CARDIOLOGY EVAL ADULT REFERRAL   3. Atrial fibrillation with RVR (H) I48.91    4. Bradycardia R00.1    5. Poor dentition K08.9      HPI  Patient presents for hospital follow-up.  She was recently hospitalized due to atrial fibrillation with rapid ventricular response.    She saw cardiology back on November 16.  At that time there were talking about pill in the pocket treatment for her atrial fibrillation which rarely occurs.  Unfortunately she was given some metoprolol.  She seems to run bradycardic on a regular basis.  Was told to go back on some metoprolol after being recently hospitalized but she did not start it because of chronic bradycardia.  This is added to her intolerance list today.    She was hospitalized 1129 until 11/30/2018.  She was subsequently started on apixaban.    She has poor teeth and is supposed to have all of her teeth pulled on December 28.  Advised that she stop her apixaban on Monday 12/24 until her teeth extracted on Friday the 28th.  She would like to meet with cardiology to discuss restarting apixaban versus other treatment options.    States that she used to be on a medication like sotalol or flecainide, something like this to take sporadically, if needed for atrial " fibrillation.  She would like to get something like this again.  Advised to talk with cardiology about this.    Functional Capacity: > 4 METS.   Reports that she can climb a flight of stairs without any chest pain/heaviness or shortness of breath.   No orthopnea/paroxysmal nocturnal dyspnea  Review of Systems   Constitutional: Negative for chills and fever.   HENT: Negative for congestion and hearing loss.         + poor teeth   Eyes: Negative for visual disturbance.   Respiratory: Negative for cough, shortness of breath and wheezing.    Cardiovascular: Positive for palpitations. Negative for chest pain.        + Runs Bradycardic all the time.    Gastrointestinal: Negative for abdominal pain, diarrhea, nausea and vomiting.   Endocrine: Negative for cold intolerance and heat intolerance.   Genitourinary: Negative for dysuria and hematuria.   Musculoskeletal: Negative for arthralgias and myalgias.   Skin: Negative for rash and wound.   Allergic/Immunologic: Negative for immunocompromised state.   Neurological: Negative for dizziness and light-headedness.   Hematological: Bruises/bleeds easily.   Psychiatric/Behavioral: Negative for agitation and confusion.      NELLY:   NELLY-7 SCORE 3/13/2018 6/28/2018 12/18/2018   Total Score 0 6 0     PHQ9:  PHQ-9 SCORE 4/9/2018 6/28/2018 12/18/2018   PHQ-9 Total Score 0 2 0       I have personally reviewed the past medical history, past surgical history, medications, allergies, family and social history as listed below, on 12/18/2018.    Allergies   Allergen Reactions     Ace Inhibitors Cough     Metoprolol Other (See Comments)     Low heart rate       Current Outpatient Medications   Medication Sig Dispense Refill     adalimumab (HUMIRA) 40 MG/0.8ML prefilled syringe kit Inject 40 mg Subcutaneous every 14 days       apixaban ANTICOAGULANT (ELIQUIS) 5 MG tablet Take 1 tablet (5 mg) by mouth 2 times daily 60 tablet 3     cholecalciferol 1000 units TABS Take 1000 units by mouth  alternating with 2000 units every other day       folic acid (FOLVITE) 1 MG tablet Take 1 mg by mouth daily        losartan (COZAAR) 50 MG tablet Take 1 tablet (50 mg) by mouth daily 90 tablet 3        Patient Active Problem List    Diagnosis Date Noted     Poor dentition 12/18/2018     Priority: Medium     Atrial fibrillation with RVR (H) 11/29/2018     Priority: Medium     Bradycardia 06/28/2018     Priority: Medium     Anemia, unspecified type 03/13/2018     Priority: Medium     Benign essential hypertension 03/13/2018     Priority: Medium     Encounter for tobacco use cessation counseling 03/13/2018     Priority: Medium     Rheumatoid arthritis, involving unspecified site, unspecified rheumatoid factor presence (H) 03/01/2018     Priority: Medium     Paroxysmal atrial fibrillation (H) 03/01/2018     Priority: Medium     Hypovitaminosis D 03/01/2018     Priority: Medium     Collagenous colitis 03/01/2018     Priority: Medium     Incontinence of feces, unspecified fecal incontinence type 03/01/2018     Priority: Medium     Past Medical History:   Diagnosis Date     Benign essential hypertension 3/13/2018     Encounter for tobacco use cessation counseling 3/13/2018     Hypovitaminosis D 3/1/2018     Paroxysmal atrial fibrillation (H) 3/1/2018     Rheumatoid arthritis, involving unspecified site, unspecified rheumatoid factor presence (H) 3/1/2018     Past Surgical History:   Procedure Laterality Date     COLONOSCOPY N/A 4/2/2018    F/U 2028     HAND SURGERY      s/p fall - age 19 or 20     Social History     Socioeconomic History     Marital status: Single     Spouse name: None     Number of children: None     Years of education: None     Highest education level: None   Social Needs     Financial resource strain: None     Food insecurity - worry: None     Food insecurity - inability: None     Transportation needs - medical: None     Transportation needs - non-medical: None   Occupational History     None   Tobacco  "Use     Smoking status: Current Every Day Smoker     Packs/day: 0.20     Years: 38.00     Pack years: 7.60     Types: Cigarettes     Smokeless tobacco: Never Used   Substance and Sexual Activity     Alcohol use: Yes     Comment: occasional.     Drug use: No     Sexual activity: Not Currently   Other Topics Concern     Parent/sibling w/ CABG, MI or angioplasty before 65F 55M? Not Asked   Social History Narrative    Moved to [place] in November 2017 from [place].     Tobacco use.     .     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     Grew-up in [facility]. Was in [place] x13 years, after [place].      Family History   Problem Relation Age of Onset     Cancer Mother         throat, tonsillar cancer     Heart Disease Father      Hypertension Father      Hyperlipidemia Father        EXAM:   Vitals:    12/18/18 1045   BP: 156/84   BP Location: Right arm   Patient Position: Sitting   Cuff Size: Adult Regular   Pulse: 52   Temp: 97.1  F (36.2  C)   TempSrc: Tympanic   Weight: 63.6 kg (140 lb 2 oz)       Current Pain Score: Moderate Pain (4)     BP Readings from Last 3 Encounters:   12/18/18 156/84   11/30/18 (!) 130/100   08/08/18 172/86      Wt Readings from Last 3 Encounters:   12/18/18 63.6 kg (140 lb 2 oz)   11/30/18 62.6 kg (138 lb 0.1 oz)   08/08/18 62.3 kg (137 lb 6.4 oz)      Estimated body mass index is 23.32 kg/m  as calculated from the following:    Height as of 7/3/18: 1.651 m (5' 5\").    Weight as of this encounter: 63.6 kg (140 lb 2 oz).     Physical Exam   Constitutional: She appears well-developed and well-nourished. No distress.   HENT:   Head: Normocephalic and atraumatic.   Eyes: Conjunctivae are normal. No scleral icterus.   Neck: Neck supple.   Cardiovascular: Regular rhythm.   + bradycardia   Pulmonary/Chest: Effort normal.   Abdominal: Soft. There is no tenderness.   Musculoskeletal: She exhibits no deformity.   Lymphadenopathy:     She has no cervical adenopathy.   Neurological: She " is alert.   Skin: Skin is warm and dry. No rash noted. She is not diaphoretic.   Psychiatric: She has a normal mood and affect.      Procedures   INVESTIGATIONS:  Results for orders placed or performed during the hospital encounter of 11/29/18   XR Chest Port 1 View    Narrative    PROCEDURE: XR CHEST PORT 1 VW 11/29/2018 11:39 AM    HISTORY: SOB;     COMPARISONS: 6/28/2018.    TECHNIQUE: Single view.    FINDINGS: Heart and pulmonary vasculature are normal. No acute  infiltrate or effusion is seen.         Impression    IMPRESSION: No acute disease.    SARY SANTO MD   CBC with platelets differential   Result Value Ref Range    WBC 5.4 4.0 - 11.0 10e9/L    RBC Count 4.07 3.8 - 5.2 10e12/L    Hemoglobin 12.9 11.7 - 15.7 g/dL    Hematocrit 37.7 35.0 - 47.0 %    MCV 93 78 - 100 fl    MCH 31.7 26.5 - 33.0 pg    MCHC 34.2 31.5 - 36.5 g/dL    RDW 12.8 10.0 - 15.0 %    Platelet Count 224 150 - 450 10e9/L    Diff Method Automated Method     % Neutrophils 49.4 %    % Lymphocytes 34.5 %    % Monocytes 10.4 %    % Eosinophils 3.5 %    % Basophils 2.0 %    % Immature Granulocytes 0.2 %    Absolute Neutrophil 2.7 1.6 - 8.3 10e9/L    Absolute Lymphocytes 1.9 0.8 - 5.3 10e9/L    Absolute Monocytes 0.6 0.0 - 1.3 10e9/L    Absolute Eosinophils 0.2 0.0 - 0.7 10e9/L    Absolute Basophils 0.1 0.0 - 0.2 10e9/L    Abs Immature Granulocytes 0.0 0 - 0.4 10e9/L   Comprehensive metabolic panel   Result Value Ref Range    Sodium 139 134 - 144 mmol/L    Potassium 4.0 3.5 - 5.1 mmol/L    Chloride 108 (H) 98 - 107 mmol/L    Carbon Dioxide 25 21 - 31 mmol/L    Anion Gap 6 3 - 14 mmol/L    Glucose 103 70 - 105 mg/dL    Urea Nitrogen 14 7 - 25 mg/dL    Creatinine 0.86 0.60 - 1.20 mg/dL    GFR Estimate 69 >60 mL/min/1.7m2    GFR Estimate If Black 83 >60 mL/min/1.7m2    Calcium 9.4 8.6 - 10.3 mg/dL    Bilirubin Total 0.5 0.3 - 1.0 mg/dL    Albumin 3.7 3.5 - 5.7 g/dL    Protein Total 6.6 6.4 - 8.9 g/dL    Alkaline Phosphatase 55 34 - 104 U/L     ALT 13 7 - 52 U/L    AST 15 13 - 39 U/L   Troponin I   Result Value Ref Range    Troponin I ES <0.030 0.000 - 0.034 ug/L   Nt probnp inpatient (BNP)   Result Value Ref Range    N-Terminal Pro BNP Inpatient 950 (H) 0 - 100 pg/mL   Thyrotropin GH   Result Value Ref Range    Thyrotropin 1.22 0.34 - 5.60 IU/mL   Magnesium   Result Value Ref Range    Magnesium 1.9 1.9 - 2.7 mg/dL   Basic metabolic panel   Result Value Ref Range    Sodium 140 134 - 144 mmol/L    Potassium 3.7 3.5 - 5.1 mmol/L    Chloride 110 (H) 98 - 107 mmol/L    Carbon Dioxide 25 21 - 31 mmol/L    Anion Gap 5 3 - 14 mmol/L    Glucose 90 70 - 105 mg/dL    Urea Nitrogen 14 7 - 25 mg/dL    Creatinine 0.90 0.60 - 1.20 mg/dL    GFR Estimate 65 >60 mL/min/1.7m2    GFR Estimate If Black 79 >60 mL/min/1.7m2    Calcium 9.0 8.6 - 10.3 mg/dL   CBC with platelets   Result Value Ref Range    WBC 5.5 4.0 - 11.0 10e9/L    RBC Count 3.98 3.8 - 5.2 10e12/L    Hemoglobin 12.4 11.7 - 15.7 g/dL    Hematocrit 36.5 35.0 - 47.0 %    MCV 92 78 - 100 fl    MCH 31.2 26.5 - 33.0 pg    MCHC 34.0 31.5 - 36.5 g/dL    RDW 12.9 10.0 - 15.0 %    Platelet Count 189 150 - 450 10e9/L   EKG 12-lead, tracing only    Impression    Atrial fibrillation with rapid ventricular response.  Rate 138 bpm.  Normal axis.  Abnormal EKG.  Compared to previous tracing June 28, 2018, bradycardia has resolved  Bakari Guillen MD       ASSESSMENT AND PLAN:  Problem List Items Addressed This Visit        Circulatory    Paroxysmal atrial fibrillation (H)    Relevant Orders    CARDIOLOGY EVAL ADULT REFERRAL    Bradycardia    Atrial fibrillation with RVR (H)       Other    Poor dentition      Other Visit Diagnoses     Hospital discharge follow-up    -  Primary        reviewed diet, exercise and weight control, recommended sodium restriction  -- Expected clinical course discussed    -- Medications and their side effects discussed    25 minutes spent in face-to-face interaction with patient (separate from  separately billed procedures) with greater than 50% spent in counseling and care coordination of listed medical problems.      The 10-year ASCVD risk score (Kryscaitlin URIZ Jr., et al., 2013) is: 8%    Values used to calculate the score:      Age: 53 years      Sex: Female      Is Non- : No      Diabetic: No      Tobacco smoker: Yes      Systolic Blood Pressure: 156 mmHg      Is BP treated: Yes      HDL Cholesterol: 54 mg/dL      Total Cholesterol: 179 mg/dL    Patient Instructions   Stop Metoprolol -- due to Bradycardia issues.     Last dose of Apixaban on 12/24 PM --- for dental extractions on Friday 12/28.     Consider restart of pill-in-the pocket approach with your Atrial Fibrillation.     Cardiology referral sent - Establish care at [facility] and [facility]  - they will call with date/time of appointment.      Return as needed for follow-up with Dr. Guillen.    Clinic : [phone]  Appointment line: [phone]      Bakari Guillen MD  Internal Medicine  [facility]     Portions of this note were dictated using speech recognition software. The note has been proofread but errors in the text may have been overlooked. Please contact me if there are any concerns regarding the accuracy of the dictation.

## 2018-12-18 NOTE — NURSING NOTE
"Patient presents to the clinic for hospital follow up.      Chief Complaint   Patient presents with     Hospital F/U       Initial There were no vitals taken for this visit. Estimated body mass index is 22.97 kg/m  as calculated from the following:    Height as of 7/3/18: 1.651 m (5' 5\").    Weight as of 11/30/18: 62.6 kg (138 lb 0.1 oz).  Medication Reconciliation: complete    Mallory Santana LPN    "

## 2018-12-18 NOTE — PATIENT INSTRUCTIONS
Stop Metoprolol -- due to Bradycardia issues.     Last dose of Apixaban on 12/24 PM --- for dental extractions on Friday 12/28.     Consider restart of pill-in-the pocket approach with your Atrial Fibrillation.     Cardiology referral sent - Establish care at [facility]  - they will call with date/time of appointment.      Return as needed for follow-up with Dr. Guillen.    Clinic : [phone]  Appointment line: [phone]

## 2018-12-19 ASSESSMENT — ANXIETY QUESTIONNAIRES: GAD7 TOTAL SCORE: 0

## 2019-01-07 ENCOUNTER — HOSPITAL ENCOUNTER (EMERGENCY)
Facility: OTHER | Age: 54
Discharge: HOME | End: 2019-01-08
Attending: FAMILY MEDICINE | Admitting: FAMILY MEDICINE
Payer: COMMERCIAL

## 2019-01-07 DIAGNOSIS — I48.91 ATRIAL FIBRILLATION WITH RAPID VENTRICULAR RESPONSE (H): ICD-10-CM

## 2019-01-07 LAB
ALBUMIN SERPL-MCNC: 3.5 G/DL (ref 3.5–5.7)
ALP SERPL-CCNC: 55 U/L (ref 34–104)
ALT SERPL W P-5'-P-CCNC: 14 U/L (ref 7–52)
ANION GAP SERPL CALCULATED.3IONS-SCNC: 6 MMOL/L (ref 3–14)
AST SERPL W P-5'-P-CCNC: 17 U/L (ref 13–39)
BASOPHILS # BLD AUTO: 0 10E9/L (ref 0–0.2)
BASOPHILS NFR BLD AUTO: 0 %
BILIRUB SERPL-MCNC: 0.2 MG/DL (ref 0.3–1)
BUN SERPL-MCNC: 15 MG/DL (ref 7–25)
CALCIUM SERPL-MCNC: 9.1 MG/DL (ref 8.6–10.3)
CHLORIDE SERPL-SCNC: 109 MMOL/L (ref 98–107)
CO2 SERPL-SCNC: 23 MMOL/L (ref 21–31)
CREAT SERPL-MCNC: 0.6 MG/DL (ref 0.6–1.2)
DIFFERENTIAL METHOD BLD: NORMAL
EOSINOPHIL # BLD AUTO: 0.2 10E9/L (ref 0–0.7)
EOSINOPHIL NFR BLD AUTO: 5 %
ERYTHROCYTE [DISTWIDTH] IN BLOOD BY AUTOMATED COUNT: 12.7 % (ref 10–15)
ETHANOL SERPL-MCNC: <0.01 %
GFR SERPL CREATININE-BSD FRML MDRD: >90 ML/MIN/{1.73_M2}
GLUCOSE SERPL-MCNC: 125 MG/DL (ref 70–105)
HCT VFR BLD AUTO: 38.3 % (ref 35–47)
HGB BLD-MCNC: 12.9 G/DL (ref 11.7–15.7)
LYMPHOCYTES # BLD AUTO: 2.1 10E9/L (ref 0.8–5.3)
LYMPHOCYTES NFR BLD AUTO: 49 %
MCH RBC QN AUTO: 30.7 PG (ref 26.5–33)
MCHC RBC AUTO-ENTMCNC: 33.7 G/DL (ref 31.5–36.5)
MCV RBC AUTO: 91 FL (ref 78–100)
MONOCYTES # BLD AUTO: 0.3 10E9/L (ref 0–1.3)
MONOCYTES NFR BLD AUTO: 8 %
NEUTROPHILS # BLD AUTO: 1.6 10E9/L (ref 1.6–8.3)
NEUTROPHILS NFR BLD AUTO: 38 %
PLATELET # BLD AUTO: 183 10E9/L (ref 150–450)
POTASSIUM SERPL-SCNC: 3.5 MMOL/L (ref 3.5–5.1)
PROT SERPL-MCNC: 6.5 G/DL (ref 6.4–8.9)
RBC # BLD AUTO: 4.2 10E12/L (ref 3.8–5.2)
SODIUM SERPL-SCNC: 138 MMOL/L (ref 134–144)
TROPONIN I SERPL-MCNC: 0.05 UG/L (ref 0–0.03)
WBC # BLD AUTO: 4.3 10E9/L (ref 4–11)

## 2019-01-07 PROCEDURE — 25000125 ZZHC RX 250: Performed by: EMERGENCY MEDICINE

## 2019-01-07 PROCEDURE — 25000132 ZZH RX MED GY IP 250 OP 250 PS 637: Performed by: FAMILY MEDICINE

## 2019-01-07 PROCEDURE — 99284 EMERGENCY DEPT VISIT MOD MDM: CPT | Mod: Z6 | Performed by: FAMILY MEDICINE

## 2019-01-07 PROCEDURE — 85025 COMPLETE CBC W/AUTO DIFF WBC: CPT | Performed by: FAMILY MEDICINE

## 2019-01-07 PROCEDURE — 96374 THER/PROPH/DIAG INJ IV PUSH: CPT | Mod: XU | Performed by: FAMILY MEDICINE

## 2019-01-07 PROCEDURE — 80320 DRUG SCREEN QUANTALCOHOLS: CPT | Performed by: FAMILY MEDICINE

## 2019-01-07 PROCEDURE — 80053 COMPREHEN METABOLIC PANEL: CPT | Performed by: FAMILY MEDICINE

## 2019-01-07 PROCEDURE — 84484 ASSAY OF TROPONIN QUANT: CPT | Mod: 91 | Performed by: FAMILY MEDICINE

## 2019-01-07 PROCEDURE — 92960 CARDIOVERSION ELECTRIC EXT: CPT | Performed by: FAMILY MEDICINE

## 2019-01-07 PROCEDURE — 93010 ELECTROCARDIOGRAM REPORT: CPT | Mod: 76 | Performed by: INTERNAL MEDICINE

## 2019-01-07 PROCEDURE — 36415 COLL VENOUS BLD VENIPUNCTURE: CPT | Mod: 91 | Performed by: FAMILY MEDICINE

## 2019-01-07 PROCEDURE — 36415 COLL VENOUS BLD VENIPUNCTURE: CPT | Performed by: FAMILY MEDICINE

## 2019-01-07 PROCEDURE — 93005 ELECTROCARDIOGRAM TRACING: CPT | Mod: XU | Performed by: FAMILY MEDICINE

## 2019-01-07 PROCEDURE — 99285 EMERGENCY DEPT VISIT HI MDM: CPT | Mod: 25 | Performed by: FAMILY MEDICINE

## 2019-01-07 PROCEDURE — 25000128 H RX IP 250 OP 636: Performed by: FAMILY MEDICINE

## 2019-01-07 PROCEDURE — 25000125 ZZHC RX 250: Performed by: FAMILY MEDICINE

## 2019-01-07 PROCEDURE — 25000128 H RX IP 250 OP 636: Performed by: EMERGENCY MEDICINE

## 2019-01-07 PROCEDURE — 96375 TX/PRO/DX INJ NEW DRUG ADDON: CPT | Mod: XU | Performed by: FAMILY MEDICINE

## 2019-01-07 PROCEDURE — 84484 ASSAY OF TROPONIN QUANT: CPT | Performed by: FAMILY MEDICINE

## 2019-01-07 RX ORDER — KETAMINE HYDROCHLORIDE 50 MG/ML
50 INJECTION, SOLUTION INTRAMUSCULAR; INTRAVENOUS ONCE
Status: COMPLETED | OUTPATIENT
Start: 2019-01-07 | End: 2019-01-07

## 2019-01-07 RX ORDER — MIDAZOLAM HYDROCHLORIDE 5 MG/ML
4 INJECTION, SOLUTION INTRAMUSCULAR; INTRAVENOUS ONCE
Status: COMPLETED | OUTPATIENT
Start: 2019-01-07 | End: 2019-01-07

## 2019-01-07 RX ORDER — METOPROLOL TARTRATE 50 MG
50 TABLET ORAL 2 TIMES DAILY
COMMUNITY
End: 2019-05-02

## 2019-01-07 RX ORDER — SODIUM CHLORIDE 9 MG/ML
1000 INJECTION, SOLUTION INTRAVENOUS CONTINUOUS
Status: DISCONTINUED | OUTPATIENT
Start: 2019-01-07 | End: 2019-01-08 | Stop reason: HOSPADM

## 2019-01-07 RX ORDER — DILTIAZEM HYDROCHLORIDE 120 MG/1
120 CAPSULE, COATED, EXTENDED RELEASE ORAL ONCE
Status: COMPLETED | OUTPATIENT
Start: 2019-01-07 | End: 2019-01-07

## 2019-01-07 RX ORDER — DILTIAZEM HYDROCHLORIDE 5 MG/ML
10 INJECTION INTRAVENOUS ONCE
Status: COMPLETED | OUTPATIENT
Start: 2019-01-07 | End: 2019-01-07

## 2019-01-07 RX ADMIN — SODIUM CHLORIDE 1000 ML: 900 INJECTION, SOLUTION INTRAVENOUS at 16:54

## 2019-01-07 RX ADMIN — DILTIAZEM HYDROCHLORIDE 120 MG: 120 CAPSULE, COATED, EXTENDED RELEASE ORAL at 18:15

## 2019-01-07 RX ADMIN — MIDAZOLAM HYDROCHLORIDE 4 MG: 5 INJECTION, SOLUTION INTRAMUSCULAR; INTRAVENOUS at 22:50

## 2019-01-07 RX ADMIN — SODIUM CHLORIDE 1000 ML: 900 INJECTION, SOLUTION INTRAVENOUS at 17:55

## 2019-01-07 RX ADMIN — KETAMINE HYDROCHLORIDE 50 MG: 50 INJECTION INTRAMUSCULAR; INTRAVENOUS at 22:52

## 2019-01-07 RX ADMIN — DILTIAZEM HYDROCHLORIDE 10 MG: 5 INJECTION INTRAVENOUS at 17:46

## 2019-01-07 ASSESSMENT — ENCOUNTER SYMPTOMS
CHEST TIGHTNESS: 1
FEVER: 0
CHILLS: 0
ADENOPATHY: 0
BRUISES/BLEEDS EASILY: 0
ABDOMINAL PAIN: 0
SHORTNESS OF BREATH: 0
BACK PAIN: 0
WOUND: 0
HEMATURIA: 0
PALPITATIONS: 1
CONFUSION: 0

## 2019-01-07 ASSESSMENT — MIFFLIN-ST. JEOR: SCORE: 1213.24

## 2019-01-07 NOTE — ED PROVIDER NOTES
"  History   No chief complaint on file.    HPI  Isabel Talbot is a 53 year old female who comes into the emergency department concerned she is in A. fib again.  She has history of recurrent A. fib and is on Eliquis.  She used to be on Rythmol as an antiarrhythmic but cardiology took her off that.  Now she uses losartan for blood pressure control and has an intermittent prescription for using metoprolol as needed if she thinks she is in A. fib.  She took 3 doses of metoprolol 50 this morning 1 at 630, 1 at 830 , and 1 at 1030 and it has not seemed to work.  She is continues to be in A. fib.  Mild chest pain earlier none now.  She sometimes will get that if her A. fib is very rapid.  History of low heart rate when she was on metoprolol daily before.  Pt arrives to ED with c/o \"afib\". Pt has h/o afib, she tried taking metoprolol, and resting without relief. Pt has irregular pulse in triage 40's-60's. Pt BP stable. Pt had pressure in chest earlier but that has subsided.      Problem List:    Patient Active Problem List    Diagnosis Date Noted     Poor dentition 12/18/2018     Priority: Medium     Atrial fibrillation with RVR (H) 11/29/2018     Priority: Medium     Bradycardia 06/28/2018     Priority: Medium     Anemia, unspecified type 03/13/2018     Priority: Medium     Benign essential hypertension 03/13/2018     Priority: Medium     Encounter for tobacco use cessation counseling 03/13/2018     Priority: Medium     Rheumatoid arthritis, involving unspecified site, unspecified rheumatoid factor presence (H) 03/01/2018     Priority: Medium     Paroxysmal atrial fibrillation (H) 03/01/2018     Priority: Medium     Hypovitaminosis D 03/01/2018     Priority: Medium     Collagenous colitis 03/01/2018     Priority: Medium     Incontinence of feces, unspecified fecal incontinence type 03/01/2018     Priority: Medium        Past Medical History:    Past Medical History:   Diagnosis Date     Benign essential hypertension " 3/13/2018     Encounter for tobacco use cessation counseling 3/13/2018     Hypovitaminosis D 3/1/2018     Paroxysmal atrial fibrillation (H) 3/1/2018     Rheumatoid arthritis, involving unspecified site, unspecified rheumatoid factor presence (H) 3/1/2018       Past Surgical History:    Past Surgical History:   Procedure Laterality Date     COLONOSCOPY N/A 4/2/2018    F/U 2028     HAND SURGERY      s/p fall - age 19 or 20       Family History:    Family History   Problem Relation Age of Onset     Cancer Mother         throat, tonsillar cancer     Heart Disease Father      Hypertension Father      Hyperlipidemia Father        Social History:  Marital Status:  Single [1]  Social History     Tobacco Use     Smoking status: Current Every Day Smoker     Packs/day: 0.20     Years: 38.00     Pack years: 7.60     Types: Cigarettes     Smokeless tobacco: Never Used   Substance Use Topics     Alcohol use: Yes     Comment: occasional.     Drug use: No        Medications:      apixaban ANTICOAGULANT (ELIQUIS) 5 MG tablet   diltiazem ER COATED BEADS (CARDIZEM CD/CARTIA XT) 120 MG 24 hr capsule   losartan (COZAAR) 50 MG tablet   metoprolol tartrate (LOPRESSOR) 50 MG tablet   adalimumab (HUMIRA) 40 MG/0.8ML prefilled syringe kit   cholecalciferol 1000 units TABS   folic acid (FOLVITE) 1 MG tablet         Review of Systems   Constitutional: Negative for chills and fever.   HENT: Negative for congestion.    Eyes: Negative for visual disturbance.   Respiratory: Positive for chest tightness. Negative for shortness of breath.    Cardiovascular: Positive for palpitations. Negative for chest pain and leg swelling.   Gastrointestinal: Negative for abdominal pain.   Genitourinary: Negative for hematuria.   Musculoskeletal: Negative for back pain.   Skin: Negative for rash and wound.   Neurological: Negative for syncope.   Hematological: Negative for adenopathy. Does not bruise/bleed easily.   Psychiatric/Behavioral: Negative for confusion.  "      Physical Exam   BP: 109/78  Pulse: 69  Heart Rate: 115  Temp: 96.7  F (35.9  C)  Resp: 16  Height: 165.1 cm (5' 5\")  Weight: 61.2 kg (135 lb)  SpO2: 100 %      Physical Exam   Constitutional: She is oriented to person, place, and time. No distress.   HENT:   Head: Normocephalic.   Cardiovascular: An irregularly irregular rhythm present. Tachycardia present. Exam reveals no friction rub.   No murmur heard.  Pulmonary/Chest: Effort normal and breath sounds normal.   Abdominal: Bowel sounds are normal.   Musculoskeletal: She exhibits no edema.   Neurological: She is alert and oriented to person, place, and time.   Skin: Skin is warm. Capillary refill takes less than 2 seconds. She is not diaphoretic.   Nursing note and vitals reviewed.      ED Course     ED Course as of Jan 11 0703 Tue Jan 08, 2019   0036 EKG 12-lead, tracing only     Procedures    A. Fib, rate 97 but on the monitor its as high as the high 140s.  No results found for this or any previous visit (from the past 24 hour(s)).    Medications   0.9% sodium chloride BOLUS (0 mLs Intravenous Stopped 1/7/19 1850)   diltiazem (CARDIZEM) injection 10 mg (10 mg Intravenous Given 1/7/19 1746)   diltiazem ER COATED BEADS (CARDIZEM CD/CARTIA XT) 24 hr capsule 120 mg (120 mg Oral Given 1/7/19 1815)   ketamine (KETALAR) injection 50 mg (50 mg Intravenous Given 1/7/19 2252)   midazolam (VERSED) injection 4 mg (4 mg Intravenous Handoff 1/7/19 2252)       Assessments & Plan (with Medical Decision Making)   Was given 10 mg of bolus Cardizem with good tolerance, she did not drop her blood pressures.  Her rate is improved in the 50-60s.  She states she normally has a low heart rate even as low as the 40s at rest.  The IV dose of Cardizem did seem to have an effect and we will see if the oral diltiazem keeps her heart rate down.  I think it be prudent to monitor her here for another hour or 2 to make sure her rate remains in control.  Her troponin did go up ever so " slightly and this is probably stable and I have little reason to believe that she is having occult acute coronary syndrome however since were going to watch her anyway I think a true third troponin would be prudent.  She had some chest tightness and pressure earlier when her heart rate was rapid but now that it is normalized she is feeling much better.  If the troponins are going up significantly transfer to cardiology would be considered.  If her rate goes up but her troponins are stable might consider putting on a Cardizem drip and admitting her to the ICU.  These evaluations will not be complete at time of shift change and will need to see Dr. Ruff's addendum for final disposition.  Patient is aware of the plan and that there will be a change in staff at shift change.  Isael Ruff MD on 1/7/2019 at 6:31 PM       Medication List      Started    diltiazem ER COATED BEADS 120 MG 24 hr capsule  Commonly known as:  CARDIZEM CD/CARTIA XT  120 mg, Oral, DAILY PRN            Final diagnoses:   Atrial fibrillation with rapid ventricular response (H)       1/7/2019   [facility]  01/07/19 1831    Care of patient turned over to myself at change of shift pending a third troponin.  This returned stable with actually a slight decrease.  Patient remained in atrial fibrillation and her rate was starting to come up again.  The rate would balance between 80 and 120 or more.  She still did not feel well and can tell that she is in atrial fibrillation.  She states that she knows without any doubt when she goes into atrial fibrillation.  She stated she was up at 4 AM and was good, when she got up again at 630 she felt that she was in the atrial fibrillation.  The patient was on Eliquis but stopped this just before [place] to sit to some dental issues and pending dental work.  She has not started again yet.  I called and spoke with Dr. Back, cardiologist at [facility].  I reviewed  all of this with him.  He was not at all concerned about the mildly elevated troponin.  We discussed cardioversion.  He felt that this was not be in reasonable given her strong conviction that she knows when she went into it.  I did discuss with her the risks of cardioversion including possible stroke.  She wished to proceed.    .mod         Cardioversion/Defibrillation:      Performed by Isael Ruff     Pre Procedure Rhythm:  Atrial Fibrillation  Consent: Consent given by: Patient who states understanding of the procedure being performed after discussing the  risks, benefits and alternatives.      Sedation:  Patient sedated with Midazolam (Versed) and Ketamine  Vital signs, airway and pulse oximetry were monitored and remained stable throughout the procedure and sedation was maintained until the procedure was complete.  The patient was monitored with staff at the bedside until she fully regained consciousness.  Procedure: The patient was placed in the supine position and the chest area was exposed. The cardioversion pads were applied in the standard manner and configuration.     The Biphasic defibrillator was set on the Synchronized mode and the patient was shocked at 120 Joules, resulting in Sinus Rhythm.     The Patient tolerated the procedure well with no immediate complications.             ECG Post Procedure:      EKG Number: 2.  Done at 2308 hours.  Interpreted by Isael Ruff  Symptoms at time of EKG: None   Rhythm: Normal sinus   Rate: Normal  Axis: Normal  Ectopy: None  Conduction: Normal  ST Segments/ T Waves: No ST-T wave changes and No acute ischemic changes  Q Waves: None  Comparison to prior: Atrial fibrillation has resolved    Clinical Impression: normal EKG    Patient was then recovered from her moderate sedation and was feeling well.  She did try 3 separate doses of metoprolol this morning when she realized she was in A. fib with no significant relief.  She had been given some Cardizem earlier in  the day and this seems to work well, so I will send her home with a prescription for some Cardizem 120 CD that she could take as needed if she goes back into atrial fibrillation.  She should call her primary physician to schedule a follow-up appointment in the near future.  Return here if worse.  Isael Ruff MD  1/8/2019, 12:27 AM           Iseal Ruff MD  01/08/19 0028       Isael Ruff MD  01/11/19 0703

## 2019-01-07 NOTE — ED TRIAGE NOTES
"Pt arrives to ED with c/o \"afib\". Pt has h/o afib, she tried taking metoprolol, and resting without relief. Pt has irregular pulse in triage 40's-60's. Pt BP stable. Pt had pressure in chest earlier but that has subsided.    Candice Hall RN on 1/7/2019 at 4:20 PM    "

## 2019-01-07 NOTE — ED NOTES
Pt roomed to [facility] and given a gown to change into.    Candice Hall RN on 1/7/2019 at 4:23 PM

## 2019-01-07 NOTE — ED AVS SNAPSHOT
[facility]  [address]  [place] [address]  Phone:  [phone]  Fax:  [phone]                                    Isabel Talbot   MRN: [id number]    Department:  [facility]   Date of Visit:  1/7/2019           After Visit Summary Signature Page    I have received my discharge instructions, and my questions have been answered. I have discussed any challenges I see with this plan with the nurse or doctor.    ..........................................................................................................................................  Patient/Patient Representative Signature      ..........................................................................................................................................  Patient Representative Print Name and Relationship to Patient    ..................................................               ................................................  Date                                   Time    ..........................................................................................................................................  Reviewed by Signature/Title    ...................................................              ..............................................  Date                                               Time          22EPIC Rev 08/18

## 2019-01-08 ENCOUNTER — TELEPHONE (OUTPATIENT)
Dept: INTERNAL MEDICINE | Facility: OTHER | Age: 54
End: 2019-01-08

## 2019-01-08 VITALS
RESPIRATION RATE: 10 BRPM | OXYGEN SATURATION: 96 % | DIASTOLIC BLOOD PRESSURE: 54 MMHG | TEMPERATURE: 96.7 F | WEIGHT: 135 LBS | HEIGHT: 65 IN | SYSTOLIC BLOOD PRESSURE: 97 MMHG | BODY MASS INDEX: 22.49 KG/M2 | HEART RATE: 52 BPM

## 2019-01-08 RX ORDER — DILTIAZEM HYDROCHLORIDE 120 MG/1
120 CAPSULE, COATED, EXTENDED RELEASE ORAL DAILY PRN
Qty: 10 CAPSULE | Refills: 0 | Status: SHIPPED | OUTPATIENT
Start: 2019-01-08 | End: 2019-05-02

## 2019-01-08 NOTE — ED NOTES
Pt cardioverted under moderate sedation. NSR, rate 60s. Vitals stable, monitoring currently by RN, ET and MD.

## 2019-01-08 NOTE — ED NOTES
Post Procedure Summary:  Prior to the start of the procedure, with procedural staff and physician participation, I verbally confirmed the patient s identity using two indicators, relevant allergies, that the procedure was appropriate and matched the consent or emergent situation, and that the correct equipment/implants were available. Immediately prior to starting the procedure I conducted the Time Out with the procedural staff and re-confirmed the patient s name, procedure, and site/side. (The [facility] universal protocol was followed.)  Yes       Sedatives: Midazolam (Versed) and Ketamine    Vital signs, airway and pulse oximetry, capnography, and cardiac rhythm were monitored and remained stable throughout the procedure and sedation was maintained until the procedure was complete.  The patient was monitored by staff until sedation discharge criteria were met.    Patient tolerance: Apnea lasting 30 seconds, resolved with:   Supplementing/Increasing oxygen and Airway Repositioning (e.g. jaw thrust, chin lift).    Sedation Start Time: 2250   Sedation End Time: 2257     Time of sedation in minutes: 7 minutes minutes from beginning to end of physician one to one monitoring.

## 2019-01-08 NOTE — DISCHARGE INSTRUCTIONS
[facility] Procedure   Adult Discharge Orders & Instructions     For 24 hours after surgery    1. Get plenty of rest.  A responsible adult must stay with you for at least 24 hours after you leave the hospital.   2. Do not drive or use heavy equipment.  If you have weakness or tingling, don't drive or use heavy equipment until this feeling goes away.  3. Do not drink alcohol.  4. Avoid strenuous or risky activities.  Ask for help when climbing stairs.   5. You may feel lightheaded.  IF so, sit for a few minutes before standing.  Have someone help you get up.   6. If you have nausea (feel sick to your stomach): Drink only clear liquids such as apple juice, ginger ale, broth or 7-Up.  Rest may also help.  Be sure to drink enough fluids.  Move to a regular diet as you feel able.  7. You may have a slight fever. Call the doctor if your fever is over 101 F (38.3 C) (taken under the tongue) or lasts longer than 24 hours.  8. You may have a dry mouth, a sore throat, muscle aches or trouble sleeping.  These should go away after 24 hours.  9. Do not make important or legal decisions.   Call your doctor for any of the followin.  Signs of infection (fever, growing tenderness at the surgery site, a large amount of drainage or bleeding, severe pain, foul-smelling drainage, redness, swelling).    2. It has been over 8 to 10 hours since surgery and you are still not able to urinate (pass water).    3.  Headache for over 24 hours.    4.  Numbness, tingling or weakness the day after surgery (if you had spinal anesthesia).  To contact the Emergency Department , call [phone]

## 2019-01-08 NOTE — LETTER
January 8, 2019      Isabel Talbot  [address] [place] [id number]        To Whom It May Concern:    Isabel Talbot was seen in the ER 1/7 - 1/8 due to atrial fibrillation.   Please excuse her absence from work.     She may return to work without restrictions.      Sincerely,        Bakari Guillen MD

## 2019-01-08 NOTE — TELEPHONE ENCOUNTER
Patient notified of providers note.  Note placed at [address] window.      Mallory Santana LPN 1/8/2019 4:16 PM

## 2019-01-08 NOTE — TELEPHONE ENCOUNTER
Patient woke up in A-Fib yesterday and was in the ER and discharged this am after they shocked her heart.  Patient needs a note for work indicating that she had to miss work yesterday and today due to A-Fib.  Patient would like provider to mention A-Fib in the note because she wants her employer to be aware of this.      Mallory Santana LPN 1/8/2019 2:54 PM

## 2019-01-08 NOTE — ED NOTES
Pt is in AFib, rate varying from 80s-130s. 4mg Versed and 50mg Ketamine given IV by this RN per MD order.

## 2019-01-08 NOTE — ED NOTES
"Pt up for walk in the hallway with his RN. Slightly dizzy with standing, felt like her legs felt stiff. Pt reported feeling better after several yards \"almost back to normal\". Also tolerated several small cups of water.  "

## 2019-05-02 ENCOUNTER — OFFICE VISIT (OUTPATIENT)
Dept: FAMILY MEDICINE | Facility: OTHER | Age: 54
End: 2019-05-02
Attending: PHYSICIAN ASSISTANT
Payer: COMMERCIAL

## 2019-05-02 VITALS
HEART RATE: 58 BPM | WEIGHT: 143.8 LBS | RESPIRATION RATE: 22 BRPM | BODY MASS INDEX: 23.93 KG/M2 | SYSTOLIC BLOOD PRESSURE: 182 MMHG | TEMPERATURE: 96.9 F | DIASTOLIC BLOOD PRESSURE: 102 MMHG

## 2019-05-02 DIAGNOSIS — Z72.0 TOBACCO ABUSE: ICD-10-CM

## 2019-05-02 DIAGNOSIS — B02.9 HERPES ZOSTER WITHOUT COMPLICATION: Primary | ICD-10-CM

## 2019-05-02 PROCEDURE — 99213 OFFICE O/P EST LOW 20 MIN: CPT | Performed by: PHYSICIAN ASSISTANT

## 2019-05-02 RX ORDER — VARENICLINE TARTRATE 1 MG/1
1 TABLET, FILM COATED ORAL 2 TIMES DAILY
Qty: 60 TABLET | Refills: 2 | Status: SHIPPED | OUTPATIENT
Start: 2019-05-02 | End: 2019-08-08

## 2019-05-02 RX ORDER — LEFLUNOMIDE 20 MG/1
TABLET ORAL
COMMUNITY
Start: 2019-05-02 | End: 2019-10-30

## 2019-05-02 RX ORDER — VALACYCLOVIR HYDROCHLORIDE 1 G/1
1000 TABLET, FILM COATED ORAL 3 TIMES DAILY
Qty: 21 TABLET | Refills: 0 | Status: SHIPPED | OUTPATIENT
Start: 2019-05-02 | End: 2019-07-17

## 2019-05-02 RX ORDER — PREDNISONE 5 MG/1
TABLET ORAL
Refills: 1 | COMMUNITY
Start: 2019-03-24 | End: 2019-07-17

## 2019-05-02 ASSESSMENT — PAIN SCALES - GENERAL: PAINLEVEL: MODERATE PAIN (5)

## 2019-05-02 NOTE — NURSING NOTE
Chief Complaint   Patient presents with     Back Pain     Hip left         Medication Reconciliation: complete    Deloris Esparza, LPN

## 2019-05-02 NOTE — PROGRESS NOTES
Nursing Notes:   Deloris Esparza LPN  5/2/2019  8:21 AM  Signed  Chief Complaint   Patient presents with     Back Pain     Hip left         Medication Reconciliation: complete    Deloris Esparza LPN      HPI:    Isabel Talbot is a 54 year old female who presents for left upper posterior buttock pain. 10 days.  Seems swollen.  Getting worse.  Tx ice.  Tx ibuprofen - helps at night.  Hx colitis.  Pain has been present off and on.  Pain with bending over.  Patient is feeling a burning, tingling, numbness sensation in her left posterior upper buttock.  The skin is very sensitive.  No blisters appreciated.  No history of having shingles in the past.  No fevers or chills.  No dysuria, frequency, urgency, hematuria.  No constipation or diarrhea acute concerns today.  No blood in her stool or black tarry stools.  No belly pain.  No nausea or vomiting.  No recent injury or fall.    Patient previously started on Chantix in the fall for tobacco abuse.  Did not complete the full course.  No side effects noted with medication.  Patient would like to restart the medication in order to quit smoking.    Past Medical History:   Diagnosis Date     Benign essential hypertension 3/13/2018     Encounter for tobacco use cessation counseling 3/13/2018     Hypovitaminosis D 3/1/2018     Paroxysmal atrial fibrillation (H) 3/1/2018     Rheumatoid arthritis, involving unspecified site, unspecified rheumatoid factor presence (H) 3/1/2018       Past Surgical History:   Procedure Laterality Date     COLONOSCOPY N/A 4/2/2018    F/U 2028     HAND SURGERY      s/p fall - age 19 or 20       Family History   Problem Relation Age of Onset     Cancer Mother         throat, tonsillar cancer     Heart Disease Father      Hypertension Father      Hyperlipidemia Father        Social History     Tobacco Use     Smoking status: Current Every Day Smoker     Packs/day: 0.20     Years: 38.00     Pack years: 7.60     Types: Cigarettes      Smokeless tobacco: Never Used   Substance Use Topics     Alcohol use: Yes     Comment: occasional.       Current Outpatient Medications   Medication Sig Dispense Refill     adalimumab (HUMIRA) 40 MG/0.8ML prefilled syringe kit Inject 40 mg Subcutaneous every 14 days       cholecalciferol 1000 units TABS Take 1000 units by mouth alternating with 2000 units every other day       leflunomide (ARAVA) 20 MG tablet        losartan (COZAAR) 50 MG tablet Take 1 tablet (50 mg) by mouth daily 90 tablet 3     predniSONE (DELTASONE) 5 MG tablet   1     valACYclovir (VALTREX) 1000 mg tablet Take 1 tablet (1,000 mg) by mouth 3 times daily for 7 days 21 tablet 0     varenicline (CHANTIX NARGIS) 0.5 MG X 11 & 1 MG X 42 tablet Take 0.5 mg tab daily for 3 days, THEN 0.5 mg tab twice daily for 4 days, THEN 1 mg twice daily. 53 tablet 0     varenicline (CHANTIX) 1 MG tablet Take 1 tablet (1 mg) by mouth 2 times daily 60 tablet 2       Allergies   Allergen Reactions     Ace Inhibitors Cough     Metoprolol Other (See Comments)     Low heart rate       REVIEW OF SYSTEMS:  Refer to HPI.    EXAM:   Vitals:    BP (!) 182/102 (BP Location: Right arm, Patient Position: Sitting, Cuff Size: Adult Regular)   Pulse 58   Temp 96.9  F (36.1  C)   Resp 22   Wt 65.2 kg (143 lb 12.8 oz)   BMI 23.93 kg/m      General Appearance: Pleasant, alert, appropriate appearance for age. No acute distress  Chest/Respiratory Exam: Normal chest wall and respirations. Clear to auscultation.  Cardiovascular Exam: Regular rate and rhythm. S1, S2, no murmur, click, gallop, or rubs.  Musculoskeletal Exam: Back is straight, full ROM of upper and lower extremities.  No spinal pain or paraspinous muscle pain with palpation.  No SI joint pain with palpation.  Skin: Left posterior superior buttock is tender with palpation and a linear line starting at the upper lumbar paraspinous muscles and radiating to the left lateral lower back.  Small cluster of erythematous papules  appreciated approximately 3 x 10 mm in diameter.  No blisters appreciated.  Neurologic Exam: Nonfocal, normal gross motor, tone coordination and no tremor.  Psychiatric Exam: Alert and oriented - appropriate affect.    PHQ Depression Screen  PHQ-9 SCORE 4/9/2018 6/28/2018 12/18/2018   PHQ-9 Total Score 0 2 0       ASSESSMENT AND PLAN:      ICD-10-CM    1. Herpes zoster without complication B02.9 valACYclovir (VALTREX) 1000 mg tablet   2. Tobacco abuse Z72.0 varenicline (CHANTIX NARGIS) 0.5 MG X 11 & 1 MG X 42 tablet     varenicline (CHANTIX) 1 MG tablet       Shingles: Patient was started on valacyclovir for treatment.  Patient was given patient education.  Encouraged to take ibuprofen or tylenol for relief up to 4 times per day.  Encouraged rest and elevation.  Encouraged to use ice 15 minutes at a time several times per day to decrease pain. Return to clinic in 1-2 weeks as necessary for persistent pain. Return to clinic with any change or worsening of symptoms.     Tobacco abuse: Restarted on Chantix.  No acute concerns at this time.  Gave side effect profile. Return to clinic with change/worsening of symptoms.     Patient Instructions     Encouraged to take ibuprofen or tylenol for relief up to 4 times per day.  Encouraged rest and elevation.  Encouraged to use ice 15 minutes at a time several times per day to decrease pain. Return to clinic in 1-2 weeks as necessary for persistent pain. Return to clinic with any change or worsening of symptoms.       Patient Education     Shingles  Shingles is a viral infection caused by the same virus that causes chicken pox. Anyone who has had chicken pox may get shingles later in life. The virus stays in the body, but remains asleep (dormant). Shingles often occurs in older persons or persons with lowered immunity. But it can affect anyone at any age.  Shingles starts as a tingling patch of skin on one side of the body. Small, painful blisters may then appear. The rash rarely  spreads to other parts of the body.  Exposure to shingles can't cause shingles. However, it can cause chicken pox in anyone who has not had chicken pox or has not been vaccinated. The contagious period ends when all blisters have crusted over, generally 1 to 2 weeks after the illness starts.  After the blisters heal, the affected skin may be sensitive or painful for weeks or months, gradually resolving over time. But, sometimes this can last longer and be permanent (called postherpetic neuralgia.)  Shingles vaccines are available. Vaccination can help prevent shingles or make it less painful. It is generally recommended for adults older than 50, even if you've had singles in the past. Talk with your healthcare provider about when to get vaccinated and which vaccine is best for you.  Home care    Medicines may be prescribed to help relieve pain. Take these medicines as directed. Ask your healthcare provider or pharmacist before using over-the-counter medicines for helping treat pain and itching.    In certain cases, antiviral medicines may be prescribed to reduce pain, shorten the illness, and prevent neuralgia. Take these medicines as directed.    Compresses made from a solution of cool water mixed with cornstarch or baking soda may help relieve pain and itching.     Gently wash skin daily with soap and water to help prevent infection. Be certain to rinse off all of the soap, which can be irritating.    Trim fingernails and try not to scratch. Scratching the sores may leave scars.    Stay home from work or school until all blisters have formed a crust and you are no longer contagious.  Follow-up care  Follow up with your healthcare provider, or as directed.  When to seek medical advice    Fever of 100.4 F (38 C) or higher, or as directed by your healthcare provider    Affected skin is on the face or neck and any of the following occur:  ? Headache  ? Eye pain  ? Changes in vision  ? Sores near the eye  ? Weakness of  facial muscles    Blisters occurring on new areas of the body    Pain, redness, or swelling of a joint    Signs of skin infection: colored drainage from the sores, warmth, increasing redness, fever, or increasing pain  Date Last Reviewed: 4/1/2018 2000-2018 [employer]. [address] [place] [address]. All rights reserved. This information is not intended as a substitute for professional medical care. Always follow your healthcare professional's instructions.           Patient Education     Shingles (Herpes Zoster)     Talk to your healthcare provider about the shingles vaccine.     Shingles is also called herpes zoster. It is a painful skin rash caused by the herpes zoster virus. This is the same virus that causes chickenpox. After a person has chickenpox, the virus remains inactive in the nerve cells. Years later, the virus can become active again and travel to the skin. Most people have shingles only once, but it is possible to have it more than once.  What are the risk factors for shingles?  Anyone who has ever had chickenpox can develop shingles. But your risk is greater if you:    Are 50 years of age or older    Have an illness that weakens your immune system, such as HIV/AIDS    Have cancer, especially Hodgkin disease or lymphoma    Take medicines that weaken your immune system  What are the symptoms of shingles?    The first sign of shingles is usually pain, burning, tingling, or itching on one part of your face or body. You may also feel as if you have the flu, with fever and chills.    A red rash with small blisters appears within a few days. The rash may appear as follows:   ? The blisters can occur anywhere, but they re most common on the back, chest, or abdomen.  ? They usually appear on only one side of the body, spreading along the nerve pathway where the virus was inactive.   ? The rash can also form around an eye, along one side of the face or neck, or in the mouth.  ? In  a few people, usually those with weakened immune systems, shingles appear on more than one part of the body at once.    After a few days, the blisters become dry and form a crust. The crust falls off in days to weeks. The blisters generally do not leave scars.  How is shingles treated?  For most people, shingles heals on its own in a few weeks. But treatment is recommended to help relieve pain, speed healing, and reduce the risk of complications. Antiviral medicines are prescribed within the first 72 hours of the appearance of the rash. To lessen symptoms:    Apply ice packs (wrapped in a thin towel) or cool compresses, or soak in a cool bath.    Use calamine lotion to calm itchy skin.    Ask your healthcare provider about over-the-counter pain relievers. If your pain is severe, your healthcare provider may prescribe stronger pain medicines.  What are the complications of shingles?  Shingles often goes away with no lasting effects. But some people have serious problems long after the blisters have healed:    Postherpetic neuralgia. This is the most common complication. It is severe nerve pain at the place where the rash used to be. It can last for months, or even years after you have had shingles. Medicines can be prescribed to help relieve the pain and improve quality of life.    Bacterial infection. Shingles blisters may become infected with bacteria. Antibiotic medicine is used to treat the infection.    Eye problems. A person with shingles on the face should see his or her healthcare provider right away. Shingles can cause serious problems with vision, and even blindness.  Very rarely shingles can also lead to pneumonia, hearing problems, brain inflammation, or even death.   When to seek medical care  Contact your healthcare provider if you experience any of the following:    Symptoms that don t go away with treatment    A rash or blisters near your eye    Increased drainage, fever, or rash after treatment, or  severe pain that doesn t go away   How can shingles be prevented?  You can only get shingles if you have had chickenpox in the past. Those who have never had chickenpox can get the virus from you. Although instead of developing shingles, the person may get chickenpox. Until your blisters form scabs, avoid contact with others, especially the following:    Pregnant women who have never had chickenpox or the vaccine    Infants who were born early (prematurely) or who had low weight at birth    People with weak immune system (for example, people receiving chemotherapy for cancer, people who have had organ transplants, or people with HIV infections)     The shingles vaccine  Shingles vaccines are available to help prevent shingles or make it less painful. Vaccination is recommended for adults 50 and older, even if you've had shingles in the past. Talk with your healthcare provider about the most appropriate time for you to get vaccinated, and which vaccine is best for you.   Date Last Reviewed: 10/1/2016    5033-0639 [employer]. [address] [place] [address]. All rights reserved. This information is not intended as a substitute for professional medical care. Always follow your healthcare professional's instructions.              Leela Carrion PA-C..................5/2/2019 8:21 AM

## 2019-05-02 NOTE — PATIENT INSTRUCTIONS
Encouraged to take ibuprofen or tylenol for relief up to 4 times per day.  Encouraged rest and elevation.  Encouraged to use ice 15 minutes at a time several times per day to decrease pain. Return to clinic in 1-2 weeks as necessary for persistent pain. Return to clinic with any change or worsening of symptoms.       Patient Education     Shingles  Shingles is a viral infection caused by the same virus that causes chicken pox. Anyone who has had chicken pox may get shingles later in life. The virus stays in the body, but remains asleep (dormant). Shingles often occurs in older persons or persons with lowered immunity. But it can affect anyone at any age.  Shingles starts as a tingling patch of skin on one side of the body. Small, painful blisters may then appear. The rash rarely spreads to other parts of the body.  Exposure to shingles can't cause shingles. However, it can cause chicken pox in anyone who has not had chicken pox or has not been vaccinated. The contagious period ends when all blisters have crusted over, generally 1 to 2 weeks after the illness starts.  After the blisters heal, the affected skin may be sensitive or painful for weeks or months, gradually resolving over time. But, sometimes this can last longer and be permanent (called postherpetic neuralgia.)  Shingles vaccines are available. Vaccination can help prevent shingles or make it less painful. It is generally recommended for adults older than 50, even if you've had singles in the past. Talk with your healthcare provider about when to get vaccinated and which vaccine is best for you.  Home care    Medicines may be prescribed to help relieve pain. Take these medicines as directed. Ask your healthcare provider or pharmacist before using over-the-counter medicines for helping treat pain and itching.    In certain cases, antiviral medicines may be prescribed to reduce pain, shorten the illness, and prevent neuralgia. Take these medicines as  directed.    Compresses made from a solution of cool water mixed with cornstarch or baking soda may help relieve pain and itching.     Gently wash skin daily with soap and water to help prevent infection. Be certain to rinse off all of the soap, which can be irritating.    Trim fingernails and try not to scratch. Scratching the sores may leave scars.    Stay home from work or school until all blisters have formed a crust and you are no longer contagious.  Follow-up care  Follow up with your healthcare provider, or as directed.  When to seek medical advice    Fever of 100.4 F (38 C) or higher, or as directed by your healthcare provider    Affected skin is on the face or neck and any of the following occur:  ? Headache  ? Eye pain  ? Changes in vision  ? Sores near the eye  ? Weakness of facial muscles    Blisters occurring on new areas of the body    Pain, redness, or swelling of a joint    Signs of skin infection: colored drainage from the sores, warmth, increasing redness, fever, or increasing pain  Date Last Reviewed: 4/1/2018 2000-2018 [employer]. [address] [place] [address]. All rights reserved. This information is not intended as a substitute for professional medical care. Always follow your healthcare professional's instructions.           Patient Education     Shingles (Herpes Zoster)     Talk to your healthcare provider about the shingles vaccine.     Shingles is also called herpes zoster. It is a painful skin rash caused by the herpes zoster virus. This is the same virus that causes chickenpox. After a person has chickenpox, the virus remains inactive in the nerve cells. Years later, the virus can become active again and travel to the skin. Most people have shingles only once, but it is possible to have it more than once.  What are the risk factors for shingles?  Anyone who has ever had chickenpox can develop shingles. But your risk is greater if you:    Are 50 years of age  or older    Have an illness that weakens your immune system, such as HIV/AIDS    Have cancer, especially Hodgkin disease or lymphoma    Take medicines that weaken your immune system  What are the symptoms of shingles?    The first sign of shingles is usually pain, burning, tingling, or itching on one part of your face or body. You may also feel as if you have the flu, with fever and chills.    A red rash with small blisters appears within a few days. The rash may appear as follows:   ? The blisters can occur anywhere, but they re most common on the back, chest, or abdomen.  ? They usually appear on only one side of the body, spreading along the nerve pathway where the virus was inactive.   ? The rash can also form around an eye, along one side of the face or neck, or in the mouth.  ? In a few people, usually those with weakened immune systems, shingles appear on more than one part of the body at once.    After a few days, the blisters become dry and form a crust. The crust falls off in days to weeks. The blisters generally do not leave scars.  How is shingles treated?  For most people, shingles heals on its own in a few weeks. But treatment is recommended to help relieve pain, speed healing, and reduce the risk of complications. Antiviral medicines are prescribed within the first 72 hours of the appearance of the rash. To lessen symptoms:    Apply ice packs (wrapped in a thin towel) or cool compresses, or soak in a cool bath.    Use calamine lotion to calm itchy skin.    Ask your healthcare provider about over-the-counter pain relievers. If your pain is severe, your healthcare provider may prescribe stronger pain medicines.  What are the complications of shingles?  Shingles often goes away with no lasting effects. But some people have serious problems long after the blisters have healed:    Postherpetic neuralgia. This is the most common complication. It is severe nerve pain at the place where the rash used to be.  It can last for months, or even years after you have had shingles. Medicines can be prescribed to help relieve the pain and improve quality of life.    Bacterial infection. Shingles blisters may become infected with bacteria. Antibiotic medicine is used to treat the infection.    Eye problems. A person with shingles on the face should see his or her healthcare provider right away. Shingles can cause serious problems with vision, and even blindness.  Very rarely shingles can also lead to pneumonia, hearing problems, brain inflammation, or even death.   When to seek medical care  Contact your healthcare provider if you experience any of the following:    Symptoms that don t go away with treatment    A rash or blisters near your eye    Increased drainage, fever, or rash after treatment, or severe pain that doesn t go away   How can shingles be prevented?  You can only get shingles if you have had chickenpox in the past. Those who have never had chickenpox can get the virus from you. Although instead of developing shingles, the person may get chickenpox. Until your blisters form scabs, avoid contact with others, especially the following:    Pregnant women who have never had chickenpox or the vaccine    Infants who were born early (prematurely) or who had low weight at birth    People with weak immune system (for example, people receiving chemotherapy for cancer, people who have had organ transplants, or people with HIV infections)     The shingles vaccine  Shingles vaccines are available to help prevent shingles or make it less painful. Vaccination is recommended for adults 50 and older, even if you've had shingles in the past. Talk with your healthcare provider about the most appropriate time for you to get vaccinated, and which vaccine is best for you.   Date Last Reviewed: 10/1/2016    6829-8241 [employer]. [address] [place] [address], [place] [address]. All rights reserved. This information is not intended  as a substitute for professional medical care. Always follow your healthcare professional's instructions.

## 2019-06-03 DIAGNOSIS — K52.831 COLLAGENOUS COLITIS: ICD-10-CM

## 2019-06-05 NOTE — TELEPHONE ENCOUNTER
Entocort not noted on current medication list and noted as patient no longer taking on 11/29/18    Called to verify with patient and left message to return call.  Domitila Mcguire RN on 6/5/2019 at 3:36 PM

## 2019-06-06 RX ORDER — BUDESONIDE 3 MG/1
CAPSULE, COATED PELLETS ORAL
Qty: 90 CAPSULE | Refills: 1 | Status: SHIPPED | OUTPATIENT
Start: 2019-06-06 | End: 2019-08-08

## 2019-06-06 NOTE — TELEPHONE ENCOUNTER
Refilled to the pharmacy.  You can take the medication up to 8 weeks at a time for each flair.  Leela Carrion PA-C.......... 6/6/2019 4:21 PM

## 2019-06-06 NOTE — TELEPHONE ENCOUNTER
Patient explained that she only got an initial 30-day fill of budesonide from [facility] Carrion on 10-16-18.  RX was discontinued in hospital on 11/29/18, patient says in error as she reported she wasn't taking it due to running out of pills.       Patient said that budesonide was very helpful with colitis symptoms. She wants to get back on it before vacation next week. Routed to [facility]. Nicolasa Greenwood RN on 6/6/2019 at 1:55 PM

## 2019-07-17 ENCOUNTER — OFFICE VISIT (OUTPATIENT)
Dept: FAMILY MEDICINE | Facility: OTHER | Age: 54
End: 2019-07-17
Attending: PHYSICIAN ASSISTANT
Payer: COMMERCIAL

## 2019-07-17 VITALS
OXYGEN SATURATION: 98 % | TEMPERATURE: 96.4 F | DIASTOLIC BLOOD PRESSURE: 90 MMHG | HEART RATE: 54 BPM | WEIGHT: 142.2 LBS | BODY MASS INDEX: 23.66 KG/M2 | RESPIRATION RATE: 18 BRPM | SYSTOLIC BLOOD PRESSURE: 162 MMHG

## 2019-07-17 DIAGNOSIS — N89.8 VAGINAL DRYNESS: ICD-10-CM

## 2019-07-17 DIAGNOSIS — I10 BENIGN ESSENTIAL HYPERTENSION: ICD-10-CM

## 2019-07-17 DIAGNOSIS — R00.2 PALPITATIONS: ICD-10-CM

## 2019-07-17 DIAGNOSIS — I48.91 ATRIAL FIBRILLATION WITH RVR (H): ICD-10-CM

## 2019-07-17 DIAGNOSIS — K52.831 COLLAGENOUS COLITIS: ICD-10-CM

## 2019-07-17 DIAGNOSIS — I48.0 PAROXYSMAL ATRIAL FIBRILLATION (H): Primary | ICD-10-CM

## 2019-07-17 PROCEDURE — 99214 OFFICE O/P EST MOD 30 MIN: CPT | Performed by: PHYSICIAN ASSISTANT

## 2019-07-17 RX ORDER — LOSARTAN POTASSIUM 50 MG/1
100 TABLET ORAL DAILY
Qty: 180 TABLET | Refills: 3 | Status: ON HOLD | OUTPATIENT
Start: 2019-07-17 | End: 2019-10-22

## 2019-07-17 ASSESSMENT — PATIENT HEALTH QUESTIONNAIRE - PHQ9: SUM OF ALL RESPONSES TO PHQ QUESTIONS 1-9: 2

## 2019-07-17 NOTE — NURSING NOTE
Chief Complaint   Patient presents with     Follow Up     b/p, budesonide     Vaginal Problem         Medication Reconciliation: complete    Deloris Esparza, LPN

## 2019-07-17 NOTE — PATIENT INSTRUCTIONS
Vaginal moisturizers are intended for use routinely, typically two or three days per week, not just during sexual activity. Many moisturizer products are available in pharmacies and online (eg, Replens, Vagisil Moisturizer, Feminease, Moist Again, K-Y Liquibeads). Lubricants are used only at the time of sexual activity.     Lubricants may be water-based (eg, Astroglide, Slippery Stuff, K-Y Jelly), silicone-based (eg, Pjur, ID Millennium), or oil-based ([employer] Women's Lubricant); oil-based lubricants cause breakdown of latex condoms.     Started on premarin cream.     Blood pressure is elevated today. Increased losartan to 100 mg daily. Encouraged to monitor blood pressure a couple times a week over the next few weeks. Return in 3-4 weeks for a recheck appointment. Work on diet and exercise to decrease blood pressure. Weight loss is helpful.  Decrease salt intake.     Referred to gastroenterology for a consult.

## 2019-07-17 NOTE — PROGRESS NOTES
Nursing Notes:   Deloris Esparza LPN  7/17/2019  9:09 AM  Signed  Chief Complaint   Patient presents with     Follow Up     b/p, budesonide     Vaginal Problem         Medication Reconciliation: complete    Deloris Esparza LPN      HPI:    Isabel Talbot is a 54 year old female who presents for recheck.  Patient has history of collagenous colitis.  She has struggled with diarrhea over the last year.  She started taking budesonide 1 month ago.  Wondering if she should continue taking.  Still having some diarrhea however it is slowly coming down.  Has not seen gastroenterology for her diagnosis.  No blood in the stool or black tarry stools.  No nausea, vomiting, fevers, chills, belly pain, back pain.    History of atrial fibrillation.  Last saw cardiology at [facility] in the fall 2018.  She has a pill that she can take when she feels like she goes into atrial fibrillation.  This has worked for her in the past.  Wondering about establishing care at [facility] to discuss her diagnosis and further treatment options.  Currently asymptomatic.  No chest pain, palpitations, problems breathing.  Occasional palpitations in the past.    Struggling with vaginal dryness.  Symptoms are causing disturbances with her sexual activity.  Her partner is getting frustrated.  They have tried using over-the-counter lubrication which has not helped.  She has also used a form of vaginal lubrication which does not help.  No abnormal vaginal bleeding or spotting.  No STD concerns.    Here to discuss her blood pressure as well.  History of hypertension.  Currently taking losartan 50 mg daily.  No side effects noted with the medication.  Tolerating medication well.  No chest pain, palpitations, problems breathing, arm pain, jaw pain.  No headaches.  Blood pressures typically run in the 140s to 150s at home.      Past Medical History:   Diagnosis Date     Benign essential hypertension 3/13/2018     Encounter for tobacco  use cessation counseling 3/13/2018     Hypovitaminosis D 3/1/2018     Paroxysmal atrial fibrillation (H) 3/1/2018     Rheumatoid arthritis, involving unspecified site, unspecified rheumatoid factor presence (H) 3/1/2018       Past Surgical History:   Procedure Laterality Date     COLONOSCOPY N/A 4/2/2018    F/U 2028     HAND SURGERY      s/p fall - age 19 or 20       Family History   Problem Relation Age of Onset     Cancer Mother         throat, tonsillar cancer     Heart Disease Father      Hypertension Father      Hyperlipidemia Father        Social History     Tobacco Use     Smoking status: Current Every Day Smoker     Packs/day: 0.20     Years: 38.00     Pack years: 7.60     Types: Cigarettes     Smokeless tobacco: Never Used   Substance Use Topics     Alcohol use: Yes     Comment: occasional.       Current Outpatient Medications   Medication Sig Dispense Refill     adalimumab (HUMIRA) 40 MG/0.8ML prefilled syringe kit Inject 40 mg Subcutaneous every 14 days       budesonide (ENTOCORT EC) 3 MG EC capsule TAKE 3 CAPSULES BY MOUTH IN THE MORNING 90 capsule 1     cholecalciferol 1000 units TABS Take 1000 units by mouth alternating with 2000 units every other day       conjugated estrogens (PREMARIN) 0.625 MG/GM vaginal cream Place 0.5 g vaginally daily for 14 days, THEN 0.5 g three times a week. 30 g 3     leflunomide (ARAVA) 20 MG tablet        losartan (COZAAR) 50 MG tablet Take 2 tablets (100 mg) by mouth daily 180 tablet 3     varenicline (CHANTIX NARGIS) 0.5 MG X 11 & 1 MG X 42 tablet Take 0.5 mg tab daily for 3 days, THEN 0.5 mg tab twice daily for 4 days, THEN 1 mg twice daily. 53 tablet 0     varenicline (CHANTIX) 1 MG tablet Take 1 tablet (1 mg) by mouth 2 times daily 60 tablet 2       Allergies   Allergen Reactions     Ace Inhibitors Cough     Metoprolol Other (See Comments)     Low heart rate       REVIEW OF SYSTEMS:  Refer to HPI.    EXAM:   Vitals:    /90 (BP Location: Right arm, Patient Position:  Sitting, Cuff Size: Adult Regular)   Pulse 54   Temp 96.4  F (35.8  C)   Resp 18   Wt 64.5 kg (142 lb 3.2 oz)   SpO2 98%   BMI 23.66 kg/m      General Appearance: Pleasant, alert, appropriate appearance for age. No acute distress  Chest/Respiratory Exam: Normal chest wall and respirations. Clear to auscultation.  Cardiovascular Exam: Regular rate and rhythm. S1, S2, no murmur, click, gallop, or rubs.  Gastrointestinal Exam: Soft, non-tender, no masses or organomegaly. Normal BS x 4.  Skin: no rash or abnormalities  Psychiatric Exam: Alert and oriented - appropriate affect.    PHQ Depression Screen  PHQ-9 SCORE 6/28/2018 12/18/2018 7/17/2019   PHQ-9 Total Score 2 0 2       ASSESSMENT AND PLAN:      ICD-10-CM    1. Paroxysmal atrial fibrillation (H) I48.0 CARDIOLOGY EVAL ADULT REFERRAL     CANCELED: CARDIOLOGY EVAL ADULT REFERRAL   2. Benign essential hypertension I10 losartan (COZAAR) 50 MG tablet     CARDIOLOGY EVAL ADULT REFERRAL     CANCELED: CARDIOLOGY EVAL ADULT REFERRAL   3. Atrial fibrillation with RVR (H) I48.91 CARDIOLOGY EVAL ADULT REFERRAL     CANCELED: CARDIOLOGY EVAL ADULT REFERRAL   4. Palpitations R00.2 CARDIOLOGY EVAL ADULT REFERRAL     CANCELED: CARDIOLOGY EVAL ADULT REFERRAL   5. Collagenous colitis K52.831 GASTROENTEROLOGY ADULT REF CONSULT ONLY   6. Vaginal dryness N89.8 conjugated estrogens (PREMARIN) 0.625 MG/GM vaginal cream       Vaginal moisturizers are intended for use routinely, typically two or three days per week, not just during sexual activity. Many moisturizer products are available in pharmacies and online (eg, Replens, Vagisil Moisturizer, Feminease, Moist Again, K-Y Liquibeads). Lubricants are used only at the time of sexual activity.     Lubricants may be water-based (eg, Astroglide, Slippery Stuff, K-Y Jelly), silicone-based (eg, Pjur, ID Millennium), or oil-based (Elegance Women's Lubricant); oil-based lubricants cause breakdown of latex condoms.     Vaginal  dryness:  Started on premarin cream.  Gave side effect profile.  Recheck in 1 month for monitoring if symptoms are not calming down or worsening as needed.    Hypertension:  Blood pressure is elevated today. Increased losartan to 100 mg daily.  Gave side effect profile.  Encouraged to monitor blood pressure a couple times a week over the next few weeks. Return in 3-4 weeks for a recheck appointment. Work on diet and exercise to decrease blood pressure. Weight loss is helpful.  Decrease salt intake.     Collagenous colitis: Encouraged to continue taking budesonide.  Continue after a full 8 weeks of treatment.  Referred to gastroenterology for a consult.      Atrial fibrillation: Referred to cardiology for consult.    Greater than 25 minutes were spent in counseling and coordination of care.     Patient Instructions   Vaginal moisturizers are intended for use routinely, typically two or three days per week, not just during sexual activity. Many moisturizer products are available in pharmacies and online (eg, Replens, Vagisil Moisturizer, Feminease, Moist Again, K-Y Liquibeads). Lubricants are used only at the time of sexual activity.     Lubricants may be water-based (eg, Astroglide, Slippery Stuff, K-Y Jelly), silicone-based (eg, Pjur, ID Millennium), or oil-based (Elegance Women's Lubricant); oil-based lubricants cause breakdown of latex condoms.     Started on premarin cream.     Blood pressure is elevated today. Increased losartan to 100 mg daily. Encouraged to monitor blood pressure a couple times a week over the next few weeks. Return in 3-4 weeks for a recheck appointment. Work on diet and exercise to decrease blood pressure. Weight loss is helpful.  Decrease salt intake.     Referred to gastroenterology for a consult.        Leela Carrion PA-C..................7/17/2019 9:07 AM

## 2019-07-25 ENCOUNTER — OFFICE VISIT (OUTPATIENT)
Dept: CARDIOLOGY | Facility: OTHER | Age: 54
End: 2019-07-25
Attending: PHYSICIAN ASSISTANT
Payer: COMMERCIAL

## 2019-07-25 VITALS
SYSTOLIC BLOOD PRESSURE: 136 MMHG | HEIGHT: 65 IN | DIASTOLIC BLOOD PRESSURE: 82 MMHG | TEMPERATURE: 97.1 F | HEART RATE: 52 BPM | WEIGHT: 137 LBS | RESPIRATION RATE: 22 BRPM | OXYGEN SATURATION: 96 % | BODY MASS INDEX: 22.82 KG/M2

## 2019-07-25 DIAGNOSIS — Z51.81 ENCOUNTER FOR MONITORING ANTI-ARRHYTHMIC THERAPY: ICD-10-CM

## 2019-07-25 DIAGNOSIS — I47.10 PSVT (PAROXYSMAL SUPRAVENTRICULAR TACHYCARDIA) (H): ICD-10-CM

## 2019-07-25 DIAGNOSIS — D64.9 ANEMIA, UNSPECIFIED TYPE: ICD-10-CM

## 2019-07-25 DIAGNOSIS — Z13.220 LIPID SCREENING: ICD-10-CM

## 2019-07-25 DIAGNOSIS — Z72.0 TOBACCO ABUSE: ICD-10-CM

## 2019-07-25 DIAGNOSIS — R00.2 PALPITATIONS: ICD-10-CM

## 2019-07-25 DIAGNOSIS — I10 BENIGN ESSENTIAL HYPERTENSION: ICD-10-CM

## 2019-07-25 DIAGNOSIS — R00.1 BRADYCARDIA: ICD-10-CM

## 2019-07-25 DIAGNOSIS — Z79.899 ENCOUNTER FOR MONITORING ANTI-ARRHYTHMIC THERAPY: ICD-10-CM

## 2019-07-25 DIAGNOSIS — R73.9 HYPERGLYCEMIA: ICD-10-CM

## 2019-07-25 DIAGNOSIS — I48.0 PAROXYSMAL ATRIAL FIBRILLATION (H): Primary | ICD-10-CM

## 2019-07-25 DIAGNOSIS — E78.2 MIXED HYPERLIPIDEMIA: Primary | ICD-10-CM

## 2019-07-25 DIAGNOSIS — Z71.6 TOBACCO ABUSE COUNSELING: ICD-10-CM

## 2019-07-25 LAB
CHOLEST SERPL-MCNC: 249 MG/DL
ERYTHROCYTE [DISTWIDTH] IN BLOOD BY AUTOMATED COUNT: 13.1 % (ref 10–15)
HBA1C MFR BLD: 5.5 % (ref 4–6)
HCT VFR BLD AUTO: 40 % (ref 35–47)
HDLC SERPL-MCNC: 107 MG/DL (ref 23–92)
HGB BLD-MCNC: 13.1 G/DL (ref 11.7–15.7)
LDLC SERPL CALC-MCNC: 129 MG/DL
MAGNESIUM SERPL-MCNC: 2.1 MG/DL (ref 1.9–2.7)
MCH RBC QN AUTO: 31.9 PG (ref 26.5–33)
MCHC RBC AUTO-ENTMCNC: 32.8 G/DL (ref 31.5–36.5)
MCV RBC AUTO: 97 FL (ref 78–100)
NONHDLC SERPL-MCNC: 142 MG/DL
PLATELET # BLD AUTO: 196 10E9/L (ref 150–450)
RBC # BLD AUTO: 4.11 10E12/L (ref 3.8–5.2)
TRIGL SERPL-MCNC: 64 MG/DL
TSH SERPL DL<=0.05 MIU/L-ACNC: 1.23 IU/ML (ref 0.34–5.6)
WBC # BLD AUTO: 4.7 10E9/L (ref 4–11)

## 2019-07-25 PROCEDURE — 85027 COMPLETE CBC AUTOMATED: CPT | Mod: ZL | Performed by: INTERNAL MEDICINE

## 2019-07-25 PROCEDURE — 93005 ELECTROCARDIOGRAM TRACING: CPT | Performed by: INTERNAL MEDICINE

## 2019-07-25 PROCEDURE — 80061 LIPID PANEL: CPT | Mod: ZL | Performed by: INTERNAL MEDICINE

## 2019-07-25 PROCEDURE — 84443 ASSAY THYROID STIM HORMONE: CPT | Mod: ZL | Performed by: INTERNAL MEDICINE

## 2019-07-25 PROCEDURE — 36415 COLL VENOUS BLD VENIPUNCTURE: CPT | Mod: ZL | Performed by: INTERNAL MEDICINE

## 2019-07-25 PROCEDURE — 99203 OFFICE O/P NEW LOW 30 MIN: CPT | Performed by: INTERNAL MEDICINE

## 2019-07-25 PROCEDURE — 83735 ASSAY OF MAGNESIUM: CPT | Mod: ZL | Performed by: INTERNAL MEDICINE

## 2019-07-25 PROCEDURE — 83036 HEMOGLOBIN GLYCOSYLATED A1C: CPT | Mod: ZL | Performed by: INTERNAL MEDICINE

## 2019-07-25 RX ORDER — DILTIAZEM HYDROCHLORIDE 120 MG/1
120 CAPSULE, COATED, EXTENDED RELEASE ORAL DAILY PRN
COMMUNITY
End: 2020-05-15

## 2019-07-25 RX ORDER — DILTIAZEM HYDROCHLORIDE 60 MG/1
60 CAPSULE, EXTENDED RELEASE ORAL 2 TIMES DAILY
Qty: 60 CAPSULE | Refills: 11 | Status: SHIPPED | OUTPATIENT
Start: 2019-07-25 | End: 2020-06-01

## 2019-07-25 RX ORDER — PROPAFENONE HYDROCHLORIDE 150 MG/1
150 TABLET, COATED ORAL 3 TIMES DAILY
Qty: 90 TABLET | Refills: 11 | Status: SHIPPED | OUTPATIENT
Start: 2019-07-25 | End: 2020-09-12

## 2019-07-25 RX ORDER — ROSUVASTATIN CALCIUM 20 MG/1
20 TABLET, COATED ORAL AT BEDTIME
Qty: 90 TABLET | Refills: 3 | Status: SHIPPED | OUTPATIENT
Start: 2019-07-25 | End: 2020-09-21

## 2019-07-25 RX ORDER — ASPIRIN 81 MG/1
81 TABLET, CHEWABLE ORAL DAILY
COMMUNITY
End: 2019-08-08

## 2019-07-25 ASSESSMENT — PAIN SCALES - GENERAL: PAINLEVEL: MILD PAIN (2)

## 2019-07-25 ASSESSMENT — MIFFLIN-ST. JEOR: SCORE: 1210.43

## 2019-07-25 NOTE — PROGRESS NOTES
[facility] HEART CARE   CARDIOLOGY CONSULT     Isabel Talbot   1965  [id number]    Bakari Guillen     Chief Complaint   Patient presents with     Consult For     paroxysmal Afib, HTN          HPI:   Mrs. Talbot is a 54-year-old female who is being seen by cardiology for paroxysmal atrial fibrillation with known history.  She has lived in [place] for approximately 2 years, previously living in [place] and [place].  She gets frequent palpitations at night she attributes to A. fib.    She has had a history of PSVT on a ZIO Patch from 6/28/2018, hypertension, palpitations, sinus bradycardia, anemia, and ongoing tobacco abuse with counseling.    She was originally diagnosed with A. fib at age 46 in [place]. At that time, she was having palpitations. She describes herself being extensively symptomatic when she has palpitations. She had been on metoprolol and Rythmol for approximately 4 to 5 years. She has remained asymptomatic while taking these medications but lost her insurance. She subsequently discontinued the medication secondary to cost. After discontinuing the medication, she had episodes approximately once a week.  For the last week or 2, she has been having nightly palpitations which she is convinced is atrial fibrillation.  She had an EKG on 11/29/2018 and 1/7/2019 which showed A. fib.  She did have a ZIO Patch on 6/28/2018 that did not show a atrial fibrillation but showed x10 runs of PSVT lasting up to 7 beats.  Her CHADSVASC score 2,getting x1 for hypertension and x1 for female gender.  She has not been lightheaded, had near-syncope, or syncope.  She had previously been following up with [facility], last being seen on 7/13/2018 and 11/16/2018.  She had seen Dr. Scales in [place].  She has chosen to be seen closer not knowing cardiology was available in [place].    She also has bradycardia with heart rates frequently in the 40's and 50's.  At one point, she was in the 30's on metoprolol.  There  was concern that she needs to go to [facility] to have a pacemaker placed.  She describes always being asymptomatic.  Her ZIO Patch from 6/28/2018 showed a slow heart rate of 33 bpm.    She continues to smoke and was encouraged to quit.  She also has a history of rheumatoid arthritis currently on Humira.    IMAGING RESULTS:   Stress test on 7/3/2018.  IMPRESSION: No evidence of abnormal myocardial perfusion. Preserved  EDV and ejection fraction.    Echo on 6/20/2018.  Interpretation Summary  Global and regional left ventricular function is hyperkinetic with an EF >70%  and left ventricular hypertrophy.  Normal right ventricular size and systolic function.  No significant valve disease.  Volumetric analysis of the left atrium suggests normal size.      ALLERGIES:   Allergies   Allergen Reactions     Ace Inhibitors Cough     Metoprolol Other (See Comments)     Low heart rate        PAST MEDICAL HISTORY:   Past Medical History:   Diagnosis Date     Benign essential hypertension 3/13/2018     Encounter for tobacco use cessation counseling 3/13/2018     Hypovitaminosis D 3/1/2018     Paroxysmal atrial fibrillation (H) 3/1/2018     Rheumatoid arthritis, involving unspecified site, unspecified rheumatoid factor presence (H) 3/1/2018        PAST SURGICAL HISTORY:   Past Surgical History:   Procedure Laterality Date     COLONOSCOPY N/A 4/2/2018    F/U 2028     HAND SURGERY      s/p fall - age 19 or 20        FAMILY HISTORY:   Family History   Problem Relation Age of Onset     Cancer Mother         throat, tonsillar cancer     Heart Disease Father      Hypertension Father      Hyperlipidemia Father         SOCIAL HISTORY:   Social History     Socioeconomic History     Marital status: Single     Spouse name: Not on file     Number of children: Not on file     Years of education: Not on file     Highest education level: Not on file   Occupational History     Not on file   Social Needs     Financial resource strain: Not on file      Food insecurity:     Worry: Not on file     Inability: Not on file     Transportation needs:     Medical: Not on file     Non-medical: Not on file   Tobacco Use     Smoking status: Current Every Day Smoker     Packs/day: 0.20     Years: 38.00     Pack years: 7.60     Types: Cigarettes     Smokeless tobacco: Never Used   Substance and Sexual Activity     Alcohol use: Yes     Comment: occasional.     Drug use: No     Sexual activity: Yes     Partners: Male   Lifestyle     Physical activity:     Days per week: Not on file     Minutes per session: Not on file     Stress: Not on file   Relationships     Social connections:     Talks on phone: Not on file     Gets together: Not on file     Attends Taoism service: Not on file     Active member of club or organization: Not on file     Attends meetings of clubs or organizations: Not on file     Relationship status: Not on file     Intimate partner violence:     Fear of current or ex partner: Not on file     Emotionally abused: Not on file     Physically abused: Not on file     Forced sexual activity: Not on file   Other Topics Concern     Parent/sibling w/ CABG, MI or angioplasty before 65F 55M? Not Asked   Social History Narrative    Moved to [place] in November 2017 from [place].     Tobacco use.     .     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     G[place]up in [facility]. Was in [place] x13 years, after [place].          CURRENT MEDICATIONS:   Prior to Admission medications    Medication Sig Start Date End Date Taking? Authorizing Provider   adalimumab (HUMIRA) 40 MG/0.8ML prefilled syringe kit Inject 40 mg Subcutaneous every 14 days    Unknown, Entered By History   budesonide (ENTOCORT EC) 3 MG EC capsule TAKE 3 CAPSULES BY MOUTH IN THE MORNING 6/6/19   Leela Carrion PA-C   cholecalciferol 1000 units TABS Take 1000 units by mouth alternating with 2000 units every other day    Unknown, Entered By History   conjugated estrogens (PREMARIN) 0.625  MG/GM vaginal cream Place 0.5 g vaginally daily for 14 days, THEN 0.5 g three times a week. 7/17/19 7/30/20  Leela Carrion PA-C   leflunomide (ARAVA) 20 MG tablet  5/2/19   Reported, Patient   losartan (COZAAR) 50 MG tablet Take 2 tablets (100 mg) by mouth daily 7/17/19   Leela Carrion PA-C   varenicline (CHANTIX NARGIS) 0.5 MG X 11 & 1 MG X 42 tablet Take 0.5 mg tab daily for 3 days, THEN 0.5 mg tab twice daily for 4 days, THEN 1 mg twice daily. 5/2/19   Leela Carrion PA-C   varenicline (CHANTIX) 1 MG tablet Take 1 tablet (1 mg) by mouth 2 times daily 5/2/19   Leela Carrion PA-C          ROS:   CONSTITUTIONAL: No weight loss, fever, chills, weakness or fatigue.   HEENT: Eyes: No visual changes. Ears, Nose, Throat: No hearing loss, congestion or difficulty swallowing.   CARDIOVASCULAR: No chest pain, chest pressure or chest discomfort.  Frequent nightly palpitations no lower extremity edema.   RESPIRATORY: Mild shortness of breath with dyspnea upon exertion, no cough or sputum production.   GASTROINTESTINAL: No abdominal pain. No anorexia, nausea, vomiting or diarrhea.   NEUROLOGICAL: No headache, lightheadedness, dizziness, syncope, ataxia or weakness.   HEMATOLOGIC: No anemia, bleeding or bruising.   PSYCHIATRIC: No history of depression or anxiety.   ENDOCRINOLOGIC: No reports of sweating, cold or heat intolerance. No polyuria or polydipsia.   SKIN: No abnormal rashes or itching.       PHYSICAL EXAM:   GENERAL: The patient is a well-developed, well-nourished, in no apparent distress. Alert and oriented x3.   HEENT: Head is normocephalic and atraumatic. Eyes are symmetrical with normal visual tracking.  HEART: Regular rate and rhythm, S1S2 present without murmur, rub or gallop.   LUNGS: Respirations regular and unlabored. Clear to auscultation.   GI: Abdomen is soft and nondistended.   EXTREMITIES: No peripheral edema present.   MUSCULOSKELETAL: No joint swelling.   NEUROLOGIC: Alert and  oriented X3.    SKIN: No jaundice. No rashes or visible skin lesions present.       LAB RESULTS:   No visits with results within 2 Month(s) from this visit.   Latest known visit with results is:   Office Visit on 06/28/2018   Component Date Value Ref Range Status     WBC 06/28/2018 5.9  4.0 - 11.0 10e9/L Final     RBC Count 06/28/2018 3.87  3.8 - 5.2 10e12/L Final     Hemoglobin 06/28/2018 12.3  11.7 - 15.7 g/dL Final     Hematocrit 06/28/2018 35.9  35.0 - 47.0 % Final     MCV 06/28/2018 93  78 - 100 fl Final     MCH 06/28/2018 31.8  26.5 - 33.0 pg Final     MCHC 06/28/2018 34.3  31.5 - 36.5 g/dL Final     RDW 06/28/2018 16.0* 10.0 - 15.0 % Final     Platelet Count 06/28/2018 219  150 - 450 10e9/L Final     Diff Method 06/28/2018 Automated Method   Final     % Neutrophils 06/28/2018 46.8  % Final     % Lymphocytes 06/28/2018 39.5  % Final     % Monocytes 06/28/2018 8.5  % Final     % Eosinophils 06/28/2018 3.9  % Final     % Basophils 06/28/2018 1.0  % Final     % Immature Granulocytes 06/28/2018 0.3  % Final     Absolute Neutrophil 06/28/2018 2.8  1.6 - 8.3 10e9/L Final     Absolute Lymphocytes 06/28/2018 2.3  0.8 - 5.3 10e9/L Final     Absolute Monocytes 06/28/2018 0.5  0.0 - 1.3 10e9/L Final     Absolute Eosinophils 06/28/2018 0.2  0.0 - 0.7 10e9/L Final     Absolute Basophils 06/28/2018 0.1  0.0 - 0.2 10e9/L Final     Abs Immature Granulocytes 06/28/2018 0.0  0 - 0.4 10e9/L Final     Sodium 06/28/2018 140  134 - 144 mmol/L Final     Potassium 06/28/2018 3.6  3.5 - 5.1 mmol/L Final     Chloride 06/28/2018 108* 98 - 107 mmol/L Final     Carbon Dioxide 06/28/2018 25  21 - 31 mmol/L Final     Anion Gap 06/28/2018 7  3 - 14 mmol/L Final     Glucose 06/28/2018 92  70 - 105 mg/dL Final     Urea Nitrogen 06/28/2018 18  7 - 25 mg/dL Final     Creatinine 06/28/2018 0.69  0.60 - 1.20 mg/dL Final     GFR Estimate 06/28/2018 89  >60 mL/min/1.7m2 Final     GFR Estimate If Black 06/28/2018 >90  >60 mL/min/1.7m2 Final      Calcium 06/28/2018 9.4  8.6 - 10.3 mg/dL Final     Bilirubin Total 06/28/2018 0.4  0.3 - 1.0 mg/dL Final     Albumin 06/28/2018 3.9  3.5 - 5.7 g/dL Final     Protein Total 06/28/2018 6.5  6.4 - 8.9 g/dL Final     Alkaline Phosphatase 06/28/2018 50  34 - 104 U/L Final     ALT 06/28/2018 40  7 - 52 U/L Final     AST 06/28/2018 25  13 - 39 U/L Final     Troponin I ES 06/28/2018 <0.030  0.000 - 0.034 ug/L Final     D-Dimer ng/mL 06/28/2018 <200  0 - 230 ng/ml D-DU Final            ASSESSMENT:       ICD-10-CM    1. Paroxysmal atrial fibrillation (H) I48.0 EKG 12-lead, tracing only (Same Day)     diltiazem ER (CARDIZEM SR) 60 MG 12 hr capsule     propafenone (RYTHMOL) 150 MG TABS tablet     apixaban ANTICOAGULANT (ELIQUIS) 5 MG tablet     Magnesium     Thyrotropin GH     Zio Patch (RANGE) Adult Pediatric > 48 hours     Thyrotropin GH   2. PSVT (paroxysmal supraventricular tachycardia) (H) I47.1 diltiazem ER (CARDIZEM SR) 60 MG 12 hr capsule     propafenone (RYTHMOL) 150 MG TABS tablet     apixaban ANTICOAGULANT (ELIQUIS) 5 MG tablet     Zio Patch (RANGE) Adult Pediatric > 48 hours   3. Benign essential hypertension I10    4. Palpitations R00.2 diltiazem ER (CARDIZEM SR) 60 MG 12 hr capsule     Zio Patch (RANGE) Adult Pediatric > 48 hours   5. Bradycardia R00.1 Zio Patch (RANGE) Adult Pediatric > 48 hours   6. Anemia, unspecified type D64.9 CBC with platelets   7. Tobacco abuse Z72.0    8. Tobacco abuse counseling Z71.6    9. Encounter for monitoring anti-arrhythmic therapy Z51.81 propafenone (RYTHMOL) 150 MG TABS tablet    Z79.899    10. Hyperglycemia R73.9 Hemoglobin A1c   11. Lipid screening Z13.220 Lipid Profile         PLAN:   1.  We discussed treatment for paroxysmal atrial fibrillation.  Recently, she was on Rythmol and metoprolol, initially started in [place].  Secondary to slow heart rates, we discussed initiating low-dose diltiazem as well as Rythmol 150 mg 3 times a day.  Her CHADSVASC score is 2, getting a  point for hypertension and female gender.  We discussed anticoagulation which she was agreeable as outlined below.  2.  She was started on Rythmol 150 mg 3 times a day and diltiazem 60 mg twice a day.  3.  She will start Eliquis 5 mg twice a day with a CHADSVASC score of 2.  4.  She will have labs today which will include a BMP, magnesium, A1c, lipid panel, and TSH.  5.  She will be set up with a Zio patch to determine if the palpitations she is having at night is truly A. fib or not.  6.  She is aware that she is to have an EKG and labs in approximately 6 to 12 months.  7.  She will be seen in approximately 3 months follow-up.      Thank you for allowing me to participate in the care of your patient. Please do not hesitate to contact me if you have any questions.     Dallas Calix, DO

## 2019-07-25 NOTE — PATIENT INSTRUCTIONS
You were seen by  Dallas Calix DO      1. Laboratory blood work has been ordered.  You will be notified by phone call or [employer] message when the results are available.      2. START taking Diltiazem 60 mg twice daily.     3. START taking Propafenone 150 mg every 8 hours.     4. START taking Eliquis 5 mg twice daily.     5. A Zio Patch (cardiac monitor) has been ordered. You will be called to schedule this. You will receive instructions for testing at that time.  You will be called with results.     6. No other changes.     You will follow up with [facility] in 2-3 months, sooner if needed.       Please call the cardiology office with problems, questions, or concerns at [phone].    If you experience chest pain, chest pressure, chest tightness, shortness of breath, fainting, lightheadedness, nausea, vomiting, or other concerning symptoms, please report to the Emergency Department or call 911. These symptoms may be emergent, and best treated in the Emergency Department.     Cardiology Nurses  JOSE Helms, RN  MIMA Merino LPN  [facility] (Unit 3C)  [phone]

## 2019-07-25 NOTE — NURSING NOTE
"Patient comes in for consult on paroxysmal Afib, HTN.  Jing Arnold LPN ....................7/25/2019   1:01 PM  Chief Complaint   Patient presents with     Consult For     paroxysmal Afib, HTN       Initial /82 (BP Location: Right arm, Patient Position: Sitting, Cuff Size: Adult Regular)   Pulse 52   Temp 97.1  F (36.2  C) (Tympanic)   Resp 22   Ht 1.64 m (5' 4.57\")   Wt 62.1 kg (137 lb)   SpO2 96%   BMI 23.10 kg/m   Estimated body mass index is 23.1 kg/m  as calculated from the following:    Height as of this encounter: 1.64 m (5' 4.57\").    Weight as of this encounter: 62.1 kg (137 lb).  Meds Reconciled: complete  Pt is on Aspirin  Pt is not on a Statin  PHQ and/or NELLY reviewed. Pt referred to PCP/MH Provider as appropriate.    Jing Arnold LPN      "

## 2019-07-30 ENCOUNTER — HOSPITAL ENCOUNTER (OUTPATIENT)
Dept: CARDIOLOGY | Facility: OTHER | Age: 54
Discharge: HOME | End: 2019-07-30
Attending: INTERNAL MEDICINE | Admitting: INTERNAL MEDICINE
Payer: COMMERCIAL

## 2019-07-30 DIAGNOSIS — I47.10 PSVT (PAROXYSMAL SUPRAVENTRICULAR TACHYCARDIA) (H): ICD-10-CM

## 2019-07-30 DIAGNOSIS — R00.1 BRADYCARDIA: ICD-10-CM

## 2019-07-30 DIAGNOSIS — R00.2 PALPITATIONS: ICD-10-CM

## 2019-07-30 DIAGNOSIS — I48.0 PAROXYSMAL ATRIAL FIBRILLATION (H): ICD-10-CM

## 2019-07-30 PROCEDURE — 0296T ZIO PATCH HOLTER ADULT PEDIATRIC GREATER THAN 48 HRS: CPT

## 2019-07-30 PROCEDURE — 0298T ZIO PATCH HOLTER ADULT PEDIATRIC GREATER THAN 48 HRS: CPT | Performed by: INTERNAL MEDICINE

## 2019-07-30 NOTE — PATIENT INSTRUCTIONS
Patient instructed not to:   -take baths, swim, sauna   -remove device prior to 14 days   -use electric blankets   -shower or sweat excessively within first 24 hours of device application  Patient instructed to:   -shower as needed   -be carefull when toweling off and dressing   -press button when cardiac symptoms occur   -document symptoms in log book   -remove and return device (send via mail to [facility])   -Call [facility] with any questions    RT Jacqueline on 7/30/2019 at 9:29 AM

## 2019-08-05 ENCOUNTER — NURSE TRIAGE (OUTPATIENT)
Dept: CARDIOLOGY | Facility: OTHER | Age: 54
End: 2019-08-05

## 2019-08-05 NOTE — TELEPHONE ENCOUNTER
"Call back to pt to advise of the following per DWB:    \"The rash could be from diltiazem, propafenone, or Crestor.  All 3 of these medications are known to cause a rash.  I do not think her rash is coming from the Eliquis.  What I would have her do is hold diltiazem, propafenone, and Crestor and continue the Eliquis.  She should hold these 3 medications for a week to see if the rash comes back.  If it does not, I would have her slowly introduce 1 medication starting with diltiazem for a week.  After being on diltiazem for a week and no recurrence of the rash, I would have her start propafenone and continue the medication for a week.  If she still does not have a rash, I would have her start back on Crestor a week later to see if that induces a rash.     Dr. Calix\"    Call to pt who verbalizes understanding, denies questions, and expresses intent to comply. Meliza De Paz RN on 8/5/2019 at 4:04 PM   "

## 2019-08-05 NOTE — TELEPHONE ENCOUNTER
Per LOV with [facility], pt was started on Diltiazem, Propafenone, Eliquis, and Crestor.     Pt reports red bumps on back and chest, some times they itch (after shower), but otherwise don't bother her. Started last week 07/30. Started Rx's on 07/26 and 07/27.     Denies any other symptoms or changes. Will route to [facility] for review. Meliza De Paz RN on 8/5/2019 at 9:36 AM

## 2019-08-08 ENCOUNTER — OFFICE VISIT (OUTPATIENT)
Dept: FAMILY MEDICINE | Facility: OTHER | Age: 54
End: 2019-08-08
Attending: PHYSICIAN ASSISTANT
Payer: COMMERCIAL

## 2019-08-08 VITALS
TEMPERATURE: 96.6 F | DIASTOLIC BLOOD PRESSURE: 88 MMHG | SYSTOLIC BLOOD PRESSURE: 142 MMHG | RESPIRATION RATE: 20 BRPM | WEIGHT: 140.8 LBS | HEART RATE: 58 BPM | BODY MASS INDEX: 23.75 KG/M2

## 2019-08-08 DIAGNOSIS — L03.113 CELLULITIS OF RIGHT HAND: ICD-10-CM

## 2019-08-08 DIAGNOSIS — L03.811 CELLULITIS OF HEAD OR SCALP: ICD-10-CM

## 2019-08-08 DIAGNOSIS — L03.312 CELLULITIS OF BACK EXCEPT BUTTOCK: Primary | ICD-10-CM

## 2019-08-08 PROCEDURE — 87070 CULTURE OTHR SPECIMN AEROBIC: CPT | Mod: ZL | Performed by: PHYSICIAN ASSISTANT

## 2019-08-08 PROCEDURE — 99213 OFFICE O/P EST LOW 20 MIN: CPT | Performed by: PHYSICIAN ASSISTANT

## 2019-08-08 RX ORDER — SULFAMETHOXAZOLE/TRIMETHOPRIM 800-160 MG
1 TABLET ORAL 2 TIMES DAILY
Qty: 20 TABLET | Refills: 0 | Status: SHIPPED | OUTPATIENT
Start: 2019-08-08 | End: 2019-08-19

## 2019-08-08 ASSESSMENT — PATIENT HEALTH QUESTIONNAIRE - PHQ9: SUM OF ALL RESPONSES TO PHQ QUESTIONS 1-9: 13

## 2019-08-08 NOTE — PROGRESS NOTES
Nursing Notes:   Deloris Esparza LPN  8/8/2019  8:39 AM  Signed  Chief Complaint   Patient presents with     Derm Problem         Medication Reconciliation: complete    Deloris Esparza LPN      HPI:    Isabel Talbot is a 54 year old female who presents for skin concern.  Patient has noticed a rash over the last 2 weeks.  Rash started on 7/30/2019.  Recently saw cardiology and was started on several new medications.  Patient was initially concerned that it was a medication side effect.  Patient was started on Diltiazem, Propafenone, Eliquis, and Crestor on 7/26 and 7/27.  She is now concerned that it is not an allergic reaction as she is getting some colored discharge from the lesions.  Rash is present on her upper back, scalp.  Presented on her right hand yesterday.  Itches at times.  No fevers or chills.  No recent cough or cold symptoms.  No history of resistant skin infections in the past.  No throat closure concerns.  No recent travel.    Past Medical History:   Diagnosis Date     Benign essential hypertension 3/13/2018     Encounter for tobacco use cessation counseling 3/13/2018     Hypovitaminosis D 3/1/2018     Paroxysmal atrial fibrillation (H) 3/1/2018     Rheumatoid arthritis, involving unspecified site, unspecified rheumatoid factor presence (H) 3/1/2018       Past Surgical History:   Procedure Laterality Date     COLONOSCOPY N/A 4/2/2018    F/U 2028     HAND SURGERY      s/p fall - age 19 or 20       Family History   Problem Relation Age of Onset     Cancer Mother         throat, tonsillar cancer     Heart Disease Father      Hypertension Father      Hyperlipidemia Father        Social History     Tobacco Use     Smoking status: Current Every Day Smoker     Packs/day: 0.20     Years: 38.00     Pack years: 7.60     Types: Cigarettes     Smokeless tobacco: Never Used     Tobacco comment: taking chantix one mo ago   Substance Use Topics     Alcohol use: Yes     Comment:  occasional.-wine/hard liquor 2 times week       Current Outpatient Medications   Medication Sig Dispense Refill     adalimumab (HUMIRA) 40 MG/0.8ML prefilled syringe kit Inject 40 mg Subcutaneous every 14 days       apixaban ANTICOAGULANT (ELIQUIS) 5 MG tablet Take 1 tablet (5 mg) by mouth 2 times daily 60 tablet 11     cholecalciferol 1000 units TABS Take 1000 units by mouth alternating with 2000 units every other day       conjugated estrogens (PREMARIN) 0.625 MG/GM vaginal cream Place 0.5 g vaginally daily for 14 days, THEN 0.5 g three times a week. 30 g 3     diltiazem ER (CARDIZEM SR) 60 MG 12 hr capsule Take 1 capsule (60 mg) by mouth 2 times daily 60 capsule 11     diltiazem ER COATED BEADS (CARDIZEM CD/CARTIA XT) 120 MG 24 hr capsule Take 120 mg by mouth daily as needed       leflunomide (ARAVA) 20 MG tablet        losartan (COZAAR) 50 MG tablet Take 2 tablets (100 mg) by mouth daily 180 tablet 3     propafenone (RYTHMOL) 150 MG TABS tablet Take 1 tablet (150 mg) by mouth 3 times daily 90 tablet 11     rosuvastatin (CRESTOR) 20 MG tablet Take 1 tablet (20 mg) by mouth At Bedtime 90 tablet 3     sulfamethoxazole-trimethoprim (BACTRIM DS/SEPTRA DS) 800-160 MG tablet Take 1 tablet by mouth 2 times daily for 10 days 20 tablet 0       Allergies   Allergen Reactions     Ace Inhibitors Cough     Metoprolol Other (See Comments)     Low heart rate       REVIEW OF SYSTEMS:  Refer to HPI.    EXAM:   Vitals:    BP (!) 142/88 (BP Location: Right arm, Patient Position: Sitting, Cuff Size: Adult Regular)   Pulse 58   Temp 96.6  F (35.9  C)   Resp 20   Wt 63.9 kg (140 lb 12.8 oz)   BMI 23.75 kg/m      General Appearance: Pleasant, alert, appropriate appearance for age. No acute distress  Chest/Respiratory Exam: Normal chest wall and respirations. Clear to auscultation.  Cardiovascular Exam: Regular rate and rhythm. S1, S2, no murmur, click, gallop, or rubs.  Skin: Several mildly erythematous papules with yellow crusty  discharge appreciated on upper back, scalp and right posterior hand.  Psychiatric Exam: Alert and oriented - appropriate affect.    PHQ Depression Screen  PHQ-9 SCORE 12/18/2018 7/17/2019 8/8/2019   PHQ-9 Total Score 0 2 13       ASSESSMENT AND PLAN:      ICD-10-CM    1. Cellulitis of back except buttock L03.312 sulfamethoxazole-trimethoprim (BACTRIM DS/SEPTRA DS) 800-160 MG tablet     Wound Culture   2. Cellulitis of head or scalp L03.811    3. Cellulitis of right hand L03.113        By the appearance of the rash it is unlikely an allergic reaction.  Patient was started on Bactrim for cellulitis of the back, scalp and right hand.  Gave warning signs and symptoms.  Completed a wound culture.  Results are pending.  Encouraged keeping wounds covered with sterile bandages.  Wash twice daily with warm soapy water.  Return if symptoms are changing or worsening.    Patient Instructions   Take the antibiotic as prescribed. Antibiotic has been sent to pharmacy. Please take full course of the antibiotic even if symptoms have completely resolved. This helps prevent against antibiotic resistance.     Watch for any signs of increasing infection (redness, pus, increased pain (may be along the tendon and back of hand if the hand is involved), increased swelling or fever). Call if any of these signs occur. Monitor for fevers or chills.    You may draw a line around the area of redness to monitor the area. Please return to clinic if redness is continuing to spread after 2-3 days.    Call or return to clinic as needed if your symptoms worsen or fail to improve as anticipated.        Leela Carrion PA-C..................8/8/2019 8:26 AM

## 2019-08-08 NOTE — NURSING NOTE
Chief Complaint   Patient presents with     Derm Problem         Medication Reconciliation: complete    Deloris Esparza, LPN     Location Of Injection #1: right arm

## 2019-08-08 NOTE — PATIENT INSTRUCTIONS
Take the antibiotic as prescribed. Antibiotic has been sent to pharmacy. Please take full course of the antibiotic even if symptoms have completely resolved. This helps prevent against antibiotic resistance.     Watch for any signs of increasing infection (redness, pus, increased pain (may be along the tendon and back of hand if the hand is involved), increased swelling or fever). Call if any of these signs occur. Monitor for fevers or chills.    You may draw a line around the area of redness to monitor the area. Please return to clinic if redness is continuing to spread after 2-3 days.    Call or return to clinic as needed if your symptoms worsen or fail to improve as anticipated.

## 2019-08-10 LAB
BACTERIA SPEC CULT: NO GROWTH
SPECIMEN SOURCE: NORMAL

## 2019-08-12 ENCOUNTER — TELEPHONE (OUTPATIENT)
Dept: INTERNAL MEDICINE | Facility: OTHER | Age: 54
End: 2019-08-12

## 2019-08-12 NOTE — TELEPHONE ENCOUNTER
Called and spoke with patient after proper verification. Patient states she is returning a call to unit 4. See result note.   Daisy Grover LPN............. August 12, 2019 1:04 PM

## 2019-08-14 ENCOUNTER — TELEPHONE (OUTPATIENT)
Dept: CARDIOLOGY | Facility: OTHER | Age: 54
End: 2019-08-14

## 2019-08-14 ENCOUNTER — TRANSFERRED RECORDS (OUTPATIENT)
Dept: HEALTH INFORMATION MANAGEMENT | Facility: OTHER | Age: 54
End: 2019-08-14

## 2019-08-15 NOTE — TELEPHONE ENCOUNTER
Call to pt to advise of the following per [facility]:    Ok to stop Eliquis 48 hours prior to procedure and start back on Eliquis within 24 hours after procedure pending agreement with performing doctor.      Pt verbalized understanding and is agreeable to plan. Meliza De Paz RN on 8/15/2019 at 4:10 PM

## 2019-08-19 ENCOUNTER — TELEPHONE (OUTPATIENT)
Dept: FAMILY MEDICINE | Facility: OTHER | Age: 54
End: 2019-08-19

## 2019-08-19 NOTE — TELEPHONE ENCOUNTER
Patient finished antibiotics yesterday, still has spots popping up on legs and breasts, is wondering if needs more antibiotics.  Courtney Esparza LPN ...... 8/19/2019 8:38 AM

## 2019-08-19 NOTE — TELEPHONE ENCOUNTER
Pt was seen by TAD last week.  She states that she still has spots and would like more antibiotics.

## 2019-08-19 NOTE — TELEPHONE ENCOUNTER
"Spots have not changed much, has more showing up. Spot on head \"bubbling\" over the weekend.    Courtney Esparza LPN ...... 8/19/2019 9:44 AM    "

## 2019-08-19 NOTE — TELEPHONE ENCOUNTER
Her wound culture did not grow out any bacteria.  Are some of the spots resolving or have the old ones not changed?  Leela Carrion PA-C..................8/19/2019 9:18 AM

## 2019-08-19 NOTE — TELEPHONE ENCOUNTER
Patient notified, coming in on Wednesday at 1015.  Courtney Esparza LPN ...... 8/19/2019 11:19 AM

## 2019-08-19 NOTE — TELEPHONE ENCOUNTER
If symptoms are worsening then I would recommend returning for a recheck in clinic or rapid clinic prior to future antibiotics.  Leela Carrion PA-C.......... 8/19/2019 11:10 AM

## 2019-09-04 ENCOUNTER — TRANSFERRED RECORDS (OUTPATIENT)
Dept: HEALTH INFORMATION MANAGEMENT | Facility: OTHER | Age: 54
End: 2019-09-04

## 2019-10-21 ENCOUNTER — HOSPITAL ENCOUNTER (OUTPATIENT)
Facility: OTHER | Age: 54
Setting detail: OBSERVATION
Discharge: HOME | End: 2019-10-22
Attending: PHYSICIAN ASSISTANT | Admitting: INTERNAL MEDICINE
Payer: COMMERCIAL

## 2019-10-21 DIAGNOSIS — F17.210 NICOTINE DEPENDENCE, CIGARETTES, UNCOMPLICATED: ICD-10-CM

## 2019-10-21 DIAGNOSIS — Z79.01 LONG TERM (CURRENT) USE OF ANTICOAGULANTS: ICD-10-CM

## 2019-10-21 DIAGNOSIS — I10 ESSENTIAL (PRIMARY) HYPERTENSION: ICD-10-CM

## 2019-10-21 DIAGNOSIS — T78.3XXA ANGIOEDEMA, INITIAL ENCOUNTER: ICD-10-CM

## 2019-10-21 DIAGNOSIS — Z79.899 ENCOUNTER FOR LONG-TERM (CURRENT) USE OF OTHER MEDICATIONS: ICD-10-CM

## 2019-10-21 DIAGNOSIS — M06.9 RHEUMATOID ARTHRITIS, UNSPECIFIED (H): ICD-10-CM

## 2019-10-21 PROCEDURE — 99285 EMERGENCY DEPT VISIT HI MDM: CPT | Mod: Z6 | Performed by: PHYSICIAN ASSISTANT

## 2019-10-21 PROCEDURE — 25000128 H RX IP 250 OP 636: Performed by: PHYSICIAN ASSISTANT

## 2019-10-21 PROCEDURE — 96374 THER/PROPH/DIAG INJ IV PUSH: CPT | Performed by: PHYSICIAN ASSISTANT

## 2019-10-21 PROCEDURE — 99285 EMERGENCY DEPT VISIT HI MDM: CPT | Mod: 25 | Performed by: PHYSICIAN ASSISTANT

## 2019-10-21 PROCEDURE — 96375 TX/PRO/DX INJ NEW DRUG ADDON: CPT | Performed by: PHYSICIAN ASSISTANT

## 2019-10-21 RX ORDER — DEXAMETHASONE SODIUM PHOSPHATE 4 MG/ML
10 INJECTION, SOLUTION INTRA-ARTICULAR; INTRALESIONAL; INTRAMUSCULAR; INTRAVENOUS; SOFT TISSUE ONCE
Status: COMPLETED | OUTPATIENT
Start: 2019-10-21 | End: 2019-10-21

## 2019-10-21 RX ORDER — DIPHENHYDRAMINE HYDROCHLORIDE 50 MG/ML
25 INJECTION INTRAMUSCULAR; INTRAVENOUS ONCE
Status: COMPLETED | OUTPATIENT
Start: 2019-10-21 | End: 2019-10-21

## 2019-10-21 RX ADMIN — FAMOTIDINE 20 MG: 20 INJECTION, SOLUTION INTRAVENOUS at 22:30

## 2019-10-21 RX ADMIN — DEXAMETHASONE SODIUM PHOSPHATE 10 MG: 4 INJECTION, SOLUTION INTRAMUSCULAR; INTRAVENOUS at 22:29

## 2019-10-21 RX ADMIN — DIPHENHYDRAMINE HYDROCHLORIDE 25 MG: 50 INJECTION INTRAMUSCULAR; INTRAVENOUS at 22:30

## 2019-10-21 RX ADMIN — SODIUM CHLORIDE 1000 ML: 9 INJECTION, SOLUTION INTRAVENOUS at 22:30

## 2019-10-22 VITALS
OXYGEN SATURATION: 96 % | WEIGHT: 142.1 LBS | HEART RATE: 68 BPM | RESPIRATION RATE: 16 BRPM | SYSTOLIC BLOOD PRESSURE: 150 MMHG | DIASTOLIC BLOOD PRESSURE: 79 MMHG | TEMPERATURE: 98.6 F | BODY MASS INDEX: 23.96 KG/M2

## 2019-10-22 PROBLEM — T78.3XXA ANGIOEDEMA: Status: ACTIVE | Noted: 2019-10-22

## 2019-10-22 PROCEDURE — 25800030 ZZH RX IP 258 OP 636: Performed by: PHYSICIAN ASSISTANT

## 2019-10-22 PROCEDURE — G0378 HOSPITAL OBSERVATION PER HR: HCPCS

## 2019-10-22 PROCEDURE — 99220 ZZC INITIAL OBSERVATION CARE,LEVL III: CPT | Performed by: INTERNAL MEDICINE

## 2019-10-22 PROCEDURE — 25000132 ZZH RX MED GY IP 250 OP 250 PS 637: Performed by: INTERNAL MEDICINE

## 2019-10-22 RX ORDER — DIPHENHYDRAMINE HYDROCHLORIDE 50 MG/ML
25 INJECTION INTRAMUSCULAR; INTRAVENOUS EVERY 6 HOURS
Status: DISCONTINUED | OUTPATIENT
Start: 2019-10-22 | End: 2019-10-22

## 2019-10-22 RX ORDER — DILTIAZEM HYDROCHLORIDE 60 MG/1
60 CAPSULE, EXTENDED RELEASE ORAL 2 TIMES DAILY
Status: DISCONTINUED | OUTPATIENT
Start: 2019-10-22 | End: 2019-10-22 | Stop reason: HOSPADM

## 2019-10-22 RX ORDER — DIPHENHYDRAMINE HYDROCHLORIDE 50 MG/ML
25 INJECTION INTRAMUSCULAR; INTRAVENOUS EVERY 6 HOURS
Status: DISCONTINUED | OUTPATIENT
Start: 2019-10-22 | End: 2019-10-22 | Stop reason: HOSPADM

## 2019-10-22 RX ORDER — SODIUM CHLORIDE 9 MG/ML
INJECTION, SOLUTION INTRAVENOUS CONTINUOUS
Status: DISCONTINUED | OUTPATIENT
Start: 2019-10-22 | End: 2019-10-22 | Stop reason: HOSPADM

## 2019-10-22 RX ORDER — AMLODIPINE BESYLATE 10 MG/1
10 TABLET ORAL DAILY
Qty: 60 TABLET | Refills: 3 | Status: SHIPPED | OUTPATIENT
Start: 2019-10-22 | End: 2020-06-18

## 2019-10-22 RX ORDER — PROPAFENONE HYDROCHLORIDE 150 MG/1
150 TABLET, COATED ORAL 3 TIMES DAILY
Status: DISCONTINUED | OUTPATIENT
Start: 2019-10-22 | End: 2019-10-22 | Stop reason: HOSPADM

## 2019-10-22 RX ORDER — EPINEPHRINE 0.3 MG/.3ML
0.3 INJECTION SUBCUTANEOUS PRN
Qty: 1 EACH | Refills: 3 | Status: SHIPPED | OUTPATIENT
Start: 2019-10-22 | End: 2021-07-20

## 2019-10-22 RX ORDER — AMLODIPINE BESYLATE 10 MG/1
10 TABLET ORAL DAILY
Status: DISCONTINUED | OUTPATIENT
Start: 2019-10-22 | End: 2019-10-22 | Stop reason: HOSPADM

## 2019-10-22 RX ADMIN — AMLODIPINE BESYLATE 10 MG: 10 TABLET ORAL at 09:03

## 2019-10-22 RX ADMIN — SODIUM CHLORIDE: 9 INJECTION, SOLUTION INTRAVENOUS at 01:14

## 2019-10-22 ASSESSMENT — ENCOUNTER SYMPTOMS
SHORTNESS OF BREATH: 0
BACK PAIN: 0
CHILLS: 0
CHEST TIGHTNESS: 0
FEVER: 0
FACIAL SWELLING: 1
WOUND: 0
ADENOPATHY: 0
HEMATURIA: 0
CONFUSION: 0
BRUISES/BLEEDS EASILY: 0
ABDOMINAL PAIN: 0

## 2019-10-22 NOTE — PROGRESS NOTES
NSG ADMISSION NOTE    Patient admitted to room 315 at approximately 0047 via cart from emergency room. Patient was accompanied by transport tech.     Verbal SBAR report received from MEE Webb prior to patient arrival.     Patient ambulated to bed independently. Patient alert and oriented X 4. The patient is not having any pain. 0-10 Pain Scale: 2. Admission vital signs: Blood pressure 131/53, pulse 50, temperature 97.3  F (36.3  C), temperature source Tympanic, resp. rate 16, weight 64.5 kg (142 lb 1.6 oz), SpO2 97 %, not currently breastfeeding. Patient was oriented to plan of care, call light, bed controls, tv, telephone, bathroom and visiting hours.     Risk Assessment    The following safety risks were identified during admission: fall. Yellow risk band applied: YES.     Skin Initial Assessment    This writer admitted this patient and completed a full skin assessment and Hiram score in the Adult PCS flowsheet. Appropriate interventions initiated as needed.     Hiram Risk Assessment  Sensory Perception: 4-->no impairment  Moisture: 4-->rarely moist  Activity: 4-->walks frequently  Mobility: 4-->no limitation  Nutrition: 3-->adequate  Friction and Shear: 3-->no apparent problem  Hiram Score: 22    Nicolasa Mccartney RN

## 2019-10-22 NOTE — PROGRESS NOTES
Chart accessed in anticipation of transfer to [facility].  Nicolasa Mccartney RN............................ 10/22/2019 12:30 AM    Report received from Tracey prior to transfer.  Nicolasa Mccartney RN............................ 10/22/2019 12:35 AM

## 2019-10-22 NOTE — PROGRESS NOTES
VSS, afebrile, denies pain.  Facial swelling has come down some.  Pt wondering when she can discharge.  Refused benadryl this morning.  Has slept comfortable since admission.  Easy to wake. Alert and oriented, ind in room.  Nicolasa Mccartney RN............................ 10/22/2019 5:57 AM

## 2019-10-22 NOTE — ED TRIAGE NOTES
Pt here by herself with c/o rt sided facial swelling that started around 6:30 this evening, pt reports that she is having pain and swelling into cheek, pt reports that she has dentures top and bottom, pt took 2 benadryl at 8 pm, pt denies any trouble breathing, VSS, pt brought back into ER to be evaluated

## 2019-10-22 NOTE — PHARMACY - DISCHARGE MEDICATION RECONCILIATION
[facility]  Part of [address] [address]    October 22, 2019    Dear Pharmacist,    Your customer, Isabel Talbot, born on 1965, was recently discharged from [facility].  We have updated her medication list and want to alert you to the following:       Review of your medicines      START taking      Dose / Directions   amLODIPine 10 MG tablet  Commonly known as:  NORVASC      Dose:  10 mg  Take 1 tablet (10 mg) by mouth daily  Quantity:  60 tablet  Refills:  3     EPINEPHrine 0.3 MG/0.3ML injection 2-pack  Commonly known as:  EPIPEN/ADRENACLICK/or ANY BX GENERIC EQUIV      Dose:  0.3 mg  Inject 0.3 mLs (0.3 mg) into the muscle as needed for anaphylaxis  Quantity:  1 each  Refills:  3        CONTINUE these medicines which have NOT CHANGED      Dose / Directions   apixaban ANTICOAGULANT 5 MG tablet  Commonly known as:  ELIQUIS ANTICOAGULANT  Used for:  Paroxysmal atrial fibrillation (H), PSVT (paroxysmal supraventricular tachycardia) (H)      Dose:  5 mg  Take 1 tablet (5 mg) by mouth 2 times daily  Quantity:  60 tablet  Refills:  11     cholecalciferol 1000 units Tabs      Take 1000 units by mouth alternating with 2000 units every other day  Refills:  0     conjugated estrogens 0.625 MG/GM vaginal cream  Commonly known as:  PREMARIN  Used for:  Vaginal dryness      Start taking on:  July 17, 2019  Place 0.5 g vaginally daily for 14 days, THEN 0.5 g three times a week.  Quantity:  30 g  Refills:  3     diltiazem ER 60 MG 12 hr capsule  Commonly known as:  CARDIZEM SR  Used for:  Paroxysmal atrial fibrillation (H), PSVT (paroxysmal supraventricular tachycardia) (H), Palpitations      Dose:  60 mg  Take 1 capsule (60 mg) by mouth 2 times daily  Quantity:  60 capsule  Refills:  11     diltiazem ER COATED BEADS 120 MG 24 hr capsule  Commonly known as:  CARDIZEM CD/CARTIA XT      Dose:  120 mg  Take 120 mg by mouth daily as  needed  Refills:  0     etanercept 25 MG vial injection kit  Commonly known as:  ENBREL      Dose:  25 mg  Inject 25 mg Subcutaneous once a week  Refills:  0     leflunomide 20 MG tablet  Commonly known as:  ARAVA      Refills:  0     propafenone 150 MG Tabs tablet  Commonly known as:  RYTHMOL  Used for:  Paroxysmal atrial fibrillation (H), PSVT (paroxysmal supraventricular tachycardia) (H), Encounter for monitoring anti-arrhythmic therapy      Dose:  150 mg  Take 1 tablet (150 mg) by mouth 3 times daily  Quantity:  90 tablet  Refills:  11     rosuvastatin 20 MG tablet  Commonly known as:  CRESTOR  Used for:  Mixed hyperlipidemia      Dose:  20 mg  Take 1 tablet (20 mg) by mouth At Bedtime  Quantity:  90 tablet  Refills:  3        STOP taking    losartan 50 MG tablet  Commonly known as:  COZAAR              Where to get your medicines      These medications were sent to [facility] [address] [place] [id number]    Phone:  [phone]     amLODIPine 10 MG tablet    EPINEPHrine 0.3 MG/0.3ML injection 2-pack         We also reviewed Isabel Talbot's allergy list and updated it as needed:  Allergies: Ace inhibitors and Metoprolol    Thank you for continuing to care for Isabel Talbot.  We look forward to working together with you in the future.    Sincerely,  Rosy Vasquez, [facility]

## 2019-10-22 NOTE — H&P
[facility] And Discharge Summary    History and Physical  Hospitalist       Date of Admission:  10/21/2019    Assessment & Plan   Isabel Talbot is a 54 year old female who presents with possible angioedema likely secondary to medication reaction.    Angioedema: Recent Enbrel start in the last month, no ACE inhibitor use but does take an ARB.  Initial right lower lip swelling without macroglossia and after IV steroids and Benadryl this resolved by morning of discharge.  Etiology possibly related to Cozaar but also possibly related to Enbrel.  After discussion with patient we have opted to stop her Cozaar and start Norvasc 10 mg daily but she was also provided with an EpiPen, as if this is due to Enbrel and the symptoms recur, she will need to take Benadryl and if symptoms progress then use EpiPen and return to the ER with consideration of stopping Enbrel going forward.  However given her significant RA and development of psoriatic rash which she is going to follow-up with dermatology and rheumatology for, will continue Enbrel at this point.    Code Status: Prior    Placido Carty    Primary Care Physician   Bakari Guillen    Chief Complaint   Angioedema of right lower lip    History is obtained from the patient and chart review.    History of Present Illness   Isabel Talbot is a 54 year old female who presents with possible probable mild angioedema of the right leg.  Yesterday patient been her normal state health and returned home from work, she fell asleep on the couch and then woke up had a drink of milk and noticed that her right lower lip had become swollen with slight involvement of her cheek.  She took Benadryl symptoms persisted and she presented to the ER.  In the ER she received IV steroids, Benadryl and Pepcid and was placed on observation for further management.  Overnight swelling resolved.    Past Medical History    I have reviewed this patient's medical history and updated it with  pertinent information if needed.   Past Medical History:   Diagnosis Date     Benign essential hypertension 3/13/2018     Encounter for tobacco use cessation counseling 3/13/2018     Hypovitaminosis D 3/1/2018     Paroxysmal atrial fibrillation (H) 3/1/2018     Rheumatoid arthritis, involving unspecified site, unspecified rheumatoid factor presence (H) 3/1/2018       Past Surgical History   I have reviewed this patient's surgical history and updated it with pertinent information if needed.  Past Surgical History:   Procedure Laterality Date     COLONOSCOPY N/A 4/2/2018    F/U 2028     COLONOSCOPY  09/04/2019    ESSENTIA follow up 10 years, normal, 9/4/29     HAND SURGERY      s/p fall - age 19 or 20       Prior to Admission Medications   Prior to Admission Medications   Prescriptions Last Dose Informant Patient Reported? Taking?   apixaban ANTICOAGULANT (ELIQUIS) 5 MG tablet   No No   Sig: Take 1 tablet (5 mg) by mouth 2 times daily   cholecalciferol 1000 units TABS   Yes No   Sig: Take 1000 units by mouth alternating with 2000 units every other day   conjugated estrogens (PREMARIN) 0.625 MG/GM vaginal cream   No No   Sig: Place 0.5 g vaginally daily for 14 days, THEN 0.5 g three times a week.   diltiazem ER (CARDIZEM SR) 60 MG 12 hr capsule   No No   Sig: Take 1 capsule (60 mg) by mouth 2 times daily   diltiazem ER COATED BEADS (CARDIZEM CD/CARTIA XT) 120 MG 24 hr capsule   Yes No   Sig: Take 120 mg by mouth daily as needed   etanercept (ENBREL) 25 MG vial injection kit Past Week at Unknown time  Yes Yes   Sig: Inject 25 mg Subcutaneous once a week   leflunomide (ARAVA) 20 MG tablet   Yes No   losartan (COZAAR) 50 MG tablet   No No   Sig: Take 2 tablets (100 mg) by mouth daily   propafenone (RYTHMOL) 150 MG TABS tablet   No No   Sig: Take 1 tablet (150 mg) by mouth 3 times daily   rosuvastatin (CRESTOR) 20 MG tablet   No No   Sig: Take 1 tablet (20 mg) by mouth At Bedtime      Facility-Administered Medications:  None     Allergies   Allergies   Allergen Reactions     Losartan Angioedema and Rash     Possible angioedema on 10/21/19 - patient to follow up with dermatology for confirmation  Previously entered as rash to hands and feet noted 7/17/18 (this allergy was deleted on 8/8/18)     Ace Inhibitors Cough     Metoprolol Other (See Comments)     Low heart rate       Social History   I have reviewed this patient's social history and updated it with pertinent information if needed. Isabel Talbot  reports that she has been smoking cigarettes. She has a 7.60 pack-year smoking history. She has never used smokeless tobacco. She reports current alcohol use. She reports that she does not use drugs.    Family History   I have reviewed this patient's family history and updated it with pertinent information if needed.   Family History   Problem Relation Age of Onset     Cancer Mother         throat, tonsillar cancer     Heart Disease Father      Hypertension Father      Hyperlipidemia Father        Review of Systems     REVIEW OF SYSTEMS:    Constitutional: normal energy and appetite, no recent sick contacts, no fevers.   Eyes: no changes in vision  Ears, nose, mouth, throat, and face: no mouth sores, dysphagia, or odynophagia  Respiratory: no shortness of breath, cough, or wheezing. No aspiration symptoms.   Cardiovascular: no chest pain, palpitations, orthopnea, increased lower extremity edema, or syncope.   Gastrointestinal: no constipation, diarrhea, nausea, vomiting or abdominal pain.  Genitourinary: no dysuria, hematuria, urgency or frequency.   Hematologic/lymphatic: no unintentional weight loss or night sweats.  Skin: She has developed multiple erythematous scaly nonclearing patches on her extensor surfaces as well as her trunk back arms and legs.  Musculoskeletal: no pain to extremities or falls.   Endocrine: not a known diabetic.     Physical Exam   Temp: 98.6  F (37  C) Temp src: Tympanic BP: (!) 150/79 Pulse: 68   Resp:  16 SpO2: 96 % O2 Device: None (Room air)    Vital Signs with Ranges  Temp:  [97.3  F (36.3  C)-98.6  F (37  C)] 98.6  F (37  C)  Pulse:  [50-68] 68  Resp:  [16] 16  BP: (130-181)/(51-87) 150/79  SpO2:  [96 %-98 %] 96 %  142 lbs 1.6 oz    Exam:  GENERAL: Talkative, in no apparent distress.  Head: normocephalic and atraumatic  Eyes: anicteric and non-injected sclera  Nose: no rhinorrhea or epistaxis.   Throat: moist mucous membranes with no active oral lesions.  No lip swelling or macroglossia.  NECK: Supple, jugular venous distension not present.  CARDIOVASCULAR: regular rate and rhythm, no murmurs, rubs, or gallops. Normal S1/S2. No lower extremity edema.   RESPIRATORY: clear to auscultation bilaterally, no wheezes, no crackles. GI: soft, non-tender, non-distended, normoactive bowel sounds.  MUSCULOSKELETAL: warm and well perfused, 2+ dorsalis pedis pulses.    SKIN: Multiple erythematous rounded well-circumscribed lesions on her arms legs and abdomen and more prominent on her extensor surfaces with silver scaly appearing flaking epithelium.  NEUROLOGY: AAOx3, follows commands, speech and language without focal deficits.     Data   Data reviewed today:  I personally reviewed no images or EKG's today.  No lab results found in last 7 days.    No results found for this or any previous visit (from the past 24 hour(s)).

## 2019-10-22 NOTE — PROGRESS NOTES
[place] [facility] DISCHARGE NOTE    Patient discharged to home at 9:15 AM via ambulation. Accompanied by staff. Discharge instructions reviewed with patient, opportunity offered to ask questions. Prescriptions sent to patients preferred pharmacy. All belongings sent with patient. Pt states she will take her medication at home. Pt given Amlodipine due to her needing to go to work this morning. Pt educated to  new prescriptions as soon as possible.     Ally Hannah RN

## 2019-10-22 NOTE — ED NOTES
Pt has continued to sleep and has facial swelling, pt having no distress, pt given observation video and handout

## 2019-10-22 NOTE — PHARMACY - DISCHARGE MEDICATION RECONCILIATION AND EDUCATION
Pharmacy:  Discharge Counseling and Medication Reconciliation    Isabel Talbot  [address]  [place] [address]  [phone] (home)   54 year old female  PCP: Bakari Guillen    Allergies: Ace inhibitors and Metoprolol    Discharge Counseling:    Pharmacist met with patient (and/or family) today to review the medication portion of the After Visit Summary (with an emphasis on NEW medications) and to address patient's questions/concerns.    Summary of Education: Met with patient to discuss new medications: amlodipine and Epipen.  Patient comfortable with injections due to weekly Enbrel injections.  We also discussed stopping her losartan.  Patient's pharmacy also contacted.    Materials Provided:  MedCounselor sheets printed from Clinical Pharmacology on: amlodipine, epinephrine    Discharge Medication Reconciliation:    It has been determined that the patient has an adequate supply of medications available or which can be obtained from the patient's preferred pharmacy, which HE/SHE has confirmed as: [facility] [An updated medication list will be faxed to the patient's pharmacy to alert them of the discontinuation of losartan.]    Thank you for the consult.    Rosy Vasquez RP........October 22, 2019 9:04 AM

## 2019-10-22 NOTE — ED PROVIDER NOTES
History     Chief Complaint   Patient presents with     Facial Swelling     HPI  Isabel Talbot is a 54 year old female who presents to the ED today with chief complaints of facial swelling.  Patient reports that approximately 3 hours prior to arrival she awoke from a nap, went to drink milk and noticed that she was having difficulty because her lip was swelling.  She reports that it has slightly spread into her right cheek.  This is never happened to her before.  She was on an ACE inhibitor in the past but due to a cough she has been switched to losartan.  No chest pain shortness of breath, fevers or dysuria.    Allergies:  Allergies   Allergen Reactions     Ace Inhibitors Cough     Metoprolol Other (See Comments)     Low heart rate       Problem List:    Patient Active Problem List    Diagnosis Date Noted     Angioedema 10/22/2019     Priority: Medium     Tobacco abuse counseling 07/25/2019     Priority: Medium     Encounter for monitoring anti-arrhythmic therapy 07/25/2019     Priority: Medium     PSVT (paroxysmal supraventricular tachycardia) (H) 07/25/2019     Priority: Medium     Palpitations 07/25/2019     Priority: Medium     Herpes zoster without complication 05/02/2019     Priority: Medium     Tobacco abuse 05/02/2019     Priority: Medium     Poor dentition 12/18/2018     Priority: Medium     Bradycardia 06/28/2018     Priority: Medium     Anemia, unspecified type 03/13/2018     Priority: Medium     Benign essential hypertension 03/13/2018     Priority: Medium     Encounter for tobacco use cessation counseling 03/13/2018     Priority: Medium     Rheumatoid arthritis, involving unspecified site, unspecified rheumatoid factor presence (H) 03/01/2018     Priority: Medium     Paroxysmal atrial fibrillation (H) 03/01/2018     Priority: Medium     Hypovitaminosis D 03/01/2018     Priority: Medium     Collagenous colitis 03/01/2018     Priority: Medium     Incontinence of feces, unspecified fecal incontinence  type 03/01/2018     Priority: Medium        Past Medical History:    Past Medical History:   Diagnosis Date     Benign essential hypertension 3/13/2018     Encounter for tobacco use cessation counseling 3/13/2018     Hypovitaminosis D 3/1/2018     Paroxysmal atrial fibrillation (H) 3/1/2018     Rheumatoid arthritis, involving unspecified site, unspecified rheumatoid factor presence (H) 3/1/2018       Past Surgical History:    Past Surgical History:   Procedure Laterality Date     COLONOSCOPY N/A 4/2/2018    F/U 2028     COLONOSCOPY  09/04/2019    ESSENTIA follow up 10 years, normal, 9/4/29     HAND SURGERY      s/p fall - age 19 or 20       Family History:    Family History   Problem Relation Age of Onset     Cancer Mother         throat, tonsillar cancer     Heart Disease Father      Hypertension Father      Hyperlipidemia Father        Social History:  Marital Status:  Single [1]  Social History     Tobacco Use     Smoking status: Current Every Day Smoker     Packs/day: 0.20     Years: 38.00     Pack years: 7.60     Types: Cigarettes     Smokeless tobacco: Never Used     Tobacco comment: taking chantix one mo ago   Substance Use Topics     Alcohol use: Yes     Comment: occasional.-wine/hard liquor 2 times week     Drug use: No        Medications:    etanercept (ENBREL) 25 MG vial injection kit  apixaban ANTICOAGULANT (ELIQUIS) 5 MG tablet  cholecalciferol 1000 units TABS  conjugated estrogens (PREMARIN) 0.625 MG/GM vaginal cream  diltiazem ER (CARDIZEM SR) 60 MG 12 hr capsule  diltiazem ER COATED BEADS (CARDIZEM CD/CARTIA XT) 120 MG 24 hr capsule  leflunomide (ARAVA) 20 MG tablet  losartan (COZAAR) 50 MG tablet  propafenone (RYTHMOL) 150 MG TABS tablet  rosuvastatin (CRESTOR) 20 MG tablet          Review of Systems   Constitutional: Negative for chills and fever.   HENT: Positive for facial swelling. Negative for congestion.    Eyes: Negative for visual disturbance.   Respiratory: Negative for chest tightness and  shortness of breath.    Cardiovascular: Negative for chest pain.   Gastrointestinal: Negative for abdominal pain.   Genitourinary: Negative for hematuria.   Musculoskeletal: Negative for back pain.   Skin: Negative for rash and wound.   Neurological: Negative for syncope.   Hematological: Negative for adenopathy. Does not bruise/bleed easily.   Psychiatric/Behavioral: Negative for confusion.       Physical Exam   BP: (!) 181/81  Pulse: 61  Temp: 97.5  F (36.4  C)  Resp: 16  Weight: 63.5 kg (140 lb)  SpO2: 98 %      Physical Exam  Constitutional:       General: She is not in acute distress.     Appearance: She is well-developed. She is not diaphoretic.   HENT:      Head: Normocephalic and atraumatic.      Nose: Nose normal.      Mouth/Throat:      Comments: Lower lip and right buccal swelling  Eyes:      General: No scleral icterus.     Extraocular Movements: Extraocular movements intact.      Conjunctiva/sclera: Conjunctivae normal.      Pupils: Pupils are equal, round, and reactive to light.   Neck:      Musculoskeletal: Neck supple.   Cardiovascular:      Rate and Rhythm: Normal rate and regular rhythm.   Pulmonary:      Effort: Pulmonary effort is normal.      Breath sounds: Normal breath sounds.   Abdominal:      Palpations: Abdomen is soft.      Tenderness: There is no tenderness.   Musculoskeletal: Normal range of motion.         General: No deformity.   Lymphadenopathy:      Cervical: No cervical adenopathy.   Skin:     General: Skin is warm and dry.      Findings: No rash.   Neurological:      General: No focal deficit present.      Mental Status: She is alert and oriented to person, place, and time.   Psychiatric:         Mood and Affect: Mood normal.         Behavior: Behavior normal.         Thought Content: Thought content normal.         Judgement: Judgment normal.         ED Course        Procedures               Critical Care time:  none               No results found for this or any previous visit  (from the past 24 hour(s)).    Medications   0.9% sodium chloride BOLUS (1,000 mLs Intravenous New Bag 10/21/19 2230)   famotidine (PEPCID) infusion 20 mg (20 mg Intravenous New Bag 10/21/19 2230)   sodium chloride 0.9% infusion (has no administration in time range)   diphenhydrAMINE (BENADRYL) injection 25 mg (has no administration in time range)   dexamethasone (DECADRON) injection 10 mg (10 mg Intravenous Given 10/21/19 2229)   diphenhydrAMINE (BENADRYL) injection 25 mg (25 mg Intravenous Given 10/21/19 2230)       Assessments & Plan (with Medical Decision Making)   Patient is nontoxic-appearing in no acute distress.  Heart, lung, bowel sounds normal.  Abdomen nontender.  She has swelling of her bottom lip as well as right buccal swelling. Uvula appears normal. The rest of oropharynx appears normal.  Vital signs are stable, afebrile.    It is unclear was causing the patient's symptoms at this time, however this may be bradykinin-mediated cause of the angioedema related to her losartan and immunosuppressive medication..    Patient given dexamethasone, 25 Benadryl and famotidine.  If this is a bradykinin related causes less likely to help the patient.    We will observe her in the emergency department.    After 3 hours in the emergency department the patient continues to have swelling in her lip and right cheek.  It does not appear to be spreading and patient is still very stable however her swelling does not appear to be improving.  Patient will be admitted for observation.    Jean Ly PA-C    I have reviewed the nursing notes.    I have reviewed the findings, diagnosis, plan and need for follow up with the patient.       New Prescriptions    No medications on file       Final diagnoses:   Angioedema, initial encounter       10/21/2019   [facility]     Jean Ly PA  10/22/19 0026

## 2019-10-22 NOTE — PROGRESS NOTES
"0315/0315-01  Isabel Talbot 54 year old female   Admission date:10/21/2019   Residence: home  Code status: Prior   Isolation:No active isolations   Principal Problem:<principal problem not specified>   Post Op Day #: NA  Peds:YES, Not applicable  [place]: na  Diet: clear liquids  Mobility Status: ind  Discharge timeline & plan: unsure of discharge date and/or discharge needs at this time.  Vital signs:  Temp: 97.3  F (36.3  C) Temp src: Tympanic BP: 131/53 Pulse: 50   Resp: 16 SpO2: 97 % O2 Device: None (Room air)     Weight: 64.5 kg (142 lb 1.6 oz)  Estimated body mass index is 23.96 kg/m  as calculated from the following:    Height as of 7/25/19: 1.64 m (5' 4.57\").    Weight as of this encounter: 64.5 kg (142 lb 1.6 oz).  Abnormal Physical Assessment:right sided facial swelling, not painful   Last Pain/PRN Medication given:na  Finger stick POCT blood sugars:na  Labs:   Telemetry: No  Pending tests/procedures planned: unknown at this time  Comments:  VSS, LS clear, BS active, skin good.  IV in right wrist      SAFETY CHECKLIST  Arm Bands/Risk clasps correct and in place (DNR, Fall risk, Allergy, Latex, Limb):  Yes  IVF and rate ordered correct: Yes  Physical assessment verification(IV site, wounds, dressing intact, incisions, LDA's, neuro, CIWA...): Yes  Environmental assessment (bed/chair alarm on, call light, side rails, restraints, sitter....): Yes  Whiteboard updated by oncoming RN:Yes    Nicolasa Mccartney RN............................ 10/22/2019 5:58 AM    Bedside Handoff complete, safety checks verified by writer and are correct.    Ally Hannah RN on 10/22/2019 at 7:15 AM          "

## 2019-10-24 ENCOUNTER — TELEPHONE (OUTPATIENT)
Dept: INTERNAL MEDICINE | Facility: OTHER | Age: 54
End: 2019-10-24

## 2019-10-24 DIAGNOSIS — M06.9 RHEUMATOID ARTHRITIS, INVOLVING UNSPECIFIED SITE, UNSPECIFIED RHEUMATOID FACTOR PRESENCE: Primary | ICD-10-CM

## 2019-10-24 NOTE — TELEPHONE ENCOUNTER
First Transitional Care Management phone call attempted at 10:36 AM 10/24/2019. Left message on machine to call back. Nicolasa Jang RN .............. 10/24/2019  10:36 AM

## 2019-10-24 NOTE — TELEPHONE ENCOUNTER
Second Transitional Care Management phone call attempted at 2:43 PM 10/24/2019.  Sada Sommers RN  ....................  10/24/2019   2:43 PM

## 2019-10-25 NOTE — TELEPHONE ENCOUNTER
Third Transitional Care Management phone call attempted at 9:36 AM 10/25/2019. Left message on machine to call back. Nicolasa Jang RN .............. 10/25/2019  9:37 AM

## 2019-10-28 NOTE — TELEPHONE ENCOUNTER
4th TCM call attempted, spoke with patient. Patient reports that she is driving right now, but willing to talk on phone Wednesday 10/30/19. Reported that we will return the call Wednesday.     Yoselyn Tejada RN on 10/28/2019 at 4:12 PM

## 2019-10-30 ENCOUNTER — OFFICE VISIT (OUTPATIENT)
Dept: FAMILY MEDICINE | Facility: OTHER | Age: 54
End: 2019-10-30
Attending: FAMILY MEDICINE
Payer: COMMERCIAL

## 2019-10-30 ENCOUNTER — HOSPITAL ENCOUNTER (OUTPATIENT)
Dept: MAMMOGRAPHY | Facility: OTHER | Age: 54
Discharge: HOME | End: 2019-10-30
Attending: INTERNAL MEDICINE | Admitting: INTERNAL MEDICINE
Payer: COMMERCIAL

## 2019-10-30 VITALS
RESPIRATION RATE: 16 BRPM | HEART RATE: 76 BPM | DIASTOLIC BLOOD PRESSURE: 90 MMHG | BODY MASS INDEX: 22.63 KG/M2 | WEIGHT: 135.8 LBS | HEIGHT: 65 IN | SYSTOLIC BLOOD PRESSURE: 130 MMHG | TEMPERATURE: 98.6 F

## 2019-10-30 DIAGNOSIS — Z12.31 VISIT FOR SCREENING MAMMOGRAM: ICD-10-CM

## 2019-10-30 DIAGNOSIS — F32.1 CURRENT MODERATE EPISODE OF MAJOR DEPRESSIVE DISORDER WITHOUT PRIOR EPISODE (H): ICD-10-CM

## 2019-10-30 DIAGNOSIS — T78.3XXD ANGIOEDEMA, SUBSEQUENT ENCOUNTER: Primary | ICD-10-CM

## 2019-10-30 PROCEDURE — 77063 BREAST TOMOSYNTHESIS BI: CPT

## 2019-10-30 PROCEDURE — 99495 TRANSJ CARE MGMT MOD F2F 14D: CPT | Performed by: FAMILY MEDICINE

## 2019-10-30 ASSESSMENT — PAIN SCALES - GENERAL: PAINLEVEL: MILD PAIN (2)

## 2019-10-30 ASSESSMENT — PATIENT HEALTH QUESTIONNAIRE - PHQ9: SUM OF ALL RESPONSES TO PHQ QUESTIONS 1-9: 26

## 2019-10-30 ASSESSMENT — ANXIETY QUESTIONNAIRES
7. FEELING AFRAID AS IF SOMETHING AWFUL MIGHT HAPPEN: NOT AT ALL
2. NOT BEING ABLE TO STOP OR CONTROL WORRYING: NOT AT ALL
1. FEELING NERVOUS, ANXIOUS, OR ON EDGE: NOT AT ALL
4. TROUBLE RELAXING: NOT AT ALL
3. WORRYING TOO MUCH ABOUT DIFFERENT THINGS: NOT AT ALL
6. BECOMING EASILY ANNOYED OR IRRITABLE: NOT AT ALL
5. BEING SO RESTLESS THAT IT IS HARD TO SIT STILL: NOT AT ALL
GAD7 TOTAL SCORE: 0

## 2019-10-30 ASSESSMENT — MIFFLIN-ST. JEOR: SCORE: 1207.89

## 2019-10-30 NOTE — NURSING NOTE
Patient presents to the clinic for a hospital follow up.  Medication Reconciliation Completed.    Nando Russell LPN  10/30/2019 10:54 AM

## 2019-10-30 NOTE — TELEPHONE ENCOUNTER
Transitional Care Management Phone Call    Summary of hospitalization:  [facility] discharge summary reviewed    DISCHARGE DIAGNOSIS: 10/22/18    DATE OF DISCHARGE: Angioedema    Diagnostic Tests/Treatments reviewed.  Follow up needed: Follow-up with Dr. Guillen as scheduled to have your blood pressure rechecked.    Post Discharge Medication Reconciliation: discharge medications reconciled and changed, per note/orders (see AVS).  Patient was seen today by Dr. Payne, and states she guessed during the medication review. She was currently standing next to her medication cabinet and completed medication reconciliation over phone.      Pt states she is no longer taking cholecalciferol. Medication discontinued per Pt request.    Pt also reports that she is not doing 25 mg of Enbrel each week, but instead her packaging reads 50 mg once a week. Medication list updated, per Pt report.    Pt also states she was started on sertraline today, by Dr. Payne.    Medications reviewed by: by myself, along with Patient over the phone.    Problems taking medications regularly:  None. She organizes her medications at the beginning of each week, into a med minder that is sorted AM/PM. Pt admits to occasionally forgetting her evening pills.    Problems adhering to non-medication therapy:  None    Other Healthcare Providers Involved in Patient s Care:         Dr. Payne (hospital follow-up provider), Dr. Guillen (PCP), Rhematology, Dermatology    Update since discharge: improved. Pt woke up this morning, with swelling below her eye. She is wondering if it is related to a skin condition she is seeing dermatology for, or it is a pimple. She discussed this with Dr. Payne during office visit earlier today.    Plan of care communicated with patient    Just a friendly reminder that you appointment is .  We encourage you to keep this appointment.   10/30/2019 11:15 AM Adrian Payne MD [facility] [phone] [place]     Please remember to bring all of your pills in their bottles (including any vitamins or over the counter pills) with you to your appointment.   The patient indicates understanding of these issues and agrees with the plan of care.   Yes, plans to follow plan of care and keep the scheduled appointment.  Patient seen by Dr. Payne this morning.  Was the patient contacted within the 2 business days or other approved timeframe?  Yes  Was the Medication reconciliation and management done since the patient was discharged? Yes    Nicolasa Jang RN .............. 10/30/2019  4:19 P

## 2019-10-31 ASSESSMENT — ANXIETY QUESTIONNAIRES: GAD7 TOTAL SCORE: 0

## 2019-10-31 NOTE — PROGRESS NOTES
SUBJECTIVE:                                                      Patient presents for Hospital Followup Visit:    Hospital:  [facility]      Date of Admission: October 21  Date of Discharge: October 22  Transitional Care Management Phone Call: October 24  Reason(s) for Admission: Angioedema            Problems taking medications regularly:  None       Medication changes since discharge: None       Problems adhering to non-medication therapy:  None    Summary of hospitalization:  [facility] discharge summary reviewed  Diagnostic Tests/Treatments reviewed.  Follow up needed: none  Other Healthcare Providers Involved in Patient s Care:         None  Update since discharge: Improved from angioedema.  But patient reports her depression has worsened.  Her PHQ was 26.  Patient is requesting a antidepressant medication.  The swelling has improved a little bit except for a small area right next to her right eye.    ROS:  Constitutional, HEENT, cardiovascular, pulmonary, gi and gu systems are negative, except as otherwise noted.    OBJECTIVE:                                                      GENERAL APPEARANCE: healthy, alert and no distress  HENT: ear canals and TM's normal and nose and mouth without ulcers or lesions  RESP: lungs clear to auscultation - no rales, rhonchi or wheezes  CV: regular rates and rhythm, normal S1 S2, no S3 or S4 and no murmur, click or rub  MS: extremities normal- no gross deformities noted  SKIN: There is a small area of swelling just lateral to her right eye.    ASSESSMENT/PLAN:                                                      1. Angioedema, subsequent encounter  Improving with IV steroids while hospitalized.  Etiology suspected from a drug reaction.    2. Current moderate episode of major depressive disorder without prior episode (H)  We will start Zoloft.  - sertraline (ZOLOFT) 50 MG tablet; Take 1 tablet (50 mg) by mouth daily  Dispense: 30 tablet; Refill: 3        Post Discharge Medication Reconciliation: discharge medications reconciled, continue medications without change.  Issues to address: Issues identified were:   Depression.  Plan of care communicated with patient  Follow-up in 1 month for recheck    Type of Medical Decision Making Face-to-Face Visit within 7 Days Face-to-Face Visit within 14 days   Moderate Complexity [id number]   High Complexity [id number]

## 2019-11-04 ENCOUNTER — HOSPITAL ENCOUNTER (OUTPATIENT)
Dept: MAMMOGRAPHY | Facility: OTHER | Age: 54
End: 2019-11-04
Attending: INTERNAL MEDICINE
Payer: COMMERCIAL

## 2019-11-04 DIAGNOSIS — R92.8 ABNORMAL MAMMOGRAM: ICD-10-CM

## 2019-11-04 PROCEDURE — G0279 TOMOSYNTHESIS, MAMMO: HCPCS

## 2019-11-20 ENCOUNTER — TELEPHONE (OUTPATIENT)
Dept: FAMILY MEDICINE | Facility: OTHER | Age: 54
End: 2019-11-20

## 2019-11-20 DIAGNOSIS — F32.1 CURRENT MODERATE EPISODE OF MAJOR DEPRESSIVE DISORDER WITHOUT PRIOR EPISODE (H): Primary | ICD-10-CM

## 2019-11-20 RX ORDER — CITALOPRAM HYDROBROMIDE 10 MG/1
10 TABLET ORAL DAILY
Qty: 90 TABLET | Refills: 1 | Status: SHIPPED | OUTPATIENT
Start: 2019-11-20 | End: 2020-06-02

## 2019-11-20 NOTE — TELEPHONE ENCOUNTER
Spoke with patient she was told to call with problems with the the Zoloft , she notes that she has had nausea and diarrhea since the start. She did check with her sister and she said she has had good results. Please send new script to [facility]. Aurora San LPN .......................11/20/2019  11:45 AM

## 2019-11-20 NOTE — TELEPHONE ENCOUNTER
Last office visit 10/30/2019 patient would like medication changed. Aurora San LPN .......................11/20/2019  11:39 AM

## 2019-11-20 NOTE — TELEPHONE ENCOUNTER
Pt states that the prescription for sertraline is making her sick.  Would like to change to Celexa

## 2019-11-25 ENCOUNTER — OFFICE VISIT (OUTPATIENT)
Dept: FAMILY MEDICINE | Facility: OTHER | Age: 54
End: 2019-11-25
Attending: FAMILY MEDICINE
Payer: COMMERCIAL

## 2019-11-25 VITALS
SYSTOLIC BLOOD PRESSURE: 132 MMHG | HEART RATE: 53 BPM | TEMPERATURE: 97.5 F | WEIGHT: 137.6 LBS | BODY MASS INDEX: 23.07 KG/M2 | OXYGEN SATURATION: 97 % | RESPIRATION RATE: 18 BRPM | DIASTOLIC BLOOD PRESSURE: 70 MMHG

## 2019-11-25 DIAGNOSIS — Z48.02 VISIT FOR SUTURE REMOVAL: Primary | ICD-10-CM

## 2019-11-25 PROCEDURE — 99213 OFFICE O/P EST LOW 20 MIN: CPT | Performed by: FAMILY MEDICINE

## 2019-11-25 RX ORDER — FLUOCINONIDE 0.5 MG/G
CREAM TOPICAL
Refills: 2 | COMMUNITY
Start: 2019-09-24 | End: 2020-09-29

## 2019-11-25 ASSESSMENT — PAIN SCALES - GENERAL: PAINLEVEL: NO PAIN (0)

## 2019-11-25 ASSESSMENT — PATIENT HEALTH QUESTIONNAIRE - PHQ9: SUM OF ALL RESPONSES TO PHQ QUESTIONS 1-9: 0

## 2019-11-25 NOTE — NURSING NOTE
Chief Complaint   Patient presents with     Suture Removal     2 spots on back from BX done on 11/6/2019 done at [facility].     Medication Reconciliation: complete    Tracey May LPN

## 2019-12-01 NOTE — PROGRESS NOTES
Nursing Notes:   Tracey May LPN  11/25/2019  4:01 PM  Signed  Chief Complaint   Patient presents with     Suture Removal     2 spots on back from BX done on 11/6/2019 done at [facility].     Medication Reconciliation: complete    Tracey May LPN       SUBJECTIVE:  HPI:Isabel Talbot is a 54 year old female here for the above issue. Pt w/o complaints. Doing well.    Allergies:  Allergies   Allergen Reactions     Losartan Angioedema and Rash     Possible angioedema on 10/21/19 - patient to follow up with dermatology for confirmation  Previously entered as rash to hands and feet noted 7/17/18 (this allergy was deleted on 8/8/18)     Ace Inhibitors Cough     Cough     Metoprolol Other (See Comments)     Low heart rate     ROS:  Denies pain or new rashes.    OBJECTIVE:  /70 (BP Location: Right arm, Patient Position: Sitting, Cuff Size: Adult Regular)   Pulse 53   Temp 97.5  F (36.4  C) (Tympanic)   Resp 18   Wt 62.4 kg (137 lb 9.6 oz)   SpO2 97%   Breastfeeding No   BMI 23.07 kg/m      EXAM:  General Appearance: Pleasant, alert, appropriate appearance for age. No acute distress  Head: Normal. Normocephalic, atraumatic.  Skin: Upper back with multiple raised scaly rash- sutures from 2 biopsy sites removed- skin healing well without signs of infection- pt tolerated procedure well    ASSESSEMENT AND PLAN:    1. Visit for suture removal  -sutures from 2 biopsy sites removed- skin healing well without signs of infection- pt tolerated procedure well  -follow up with derm for bx results    Jazmin Altman MD  [facility]

## 2020-01-09 ENCOUNTER — APPOINTMENT (OUTPATIENT)
Dept: GENERAL RADIOLOGY | Facility: OTHER | Age: 55
End: 2020-01-09
Attending: PHYSICIAN ASSISTANT
Payer: COMMERCIAL

## 2020-01-09 ENCOUNTER — HOSPITAL ENCOUNTER (EMERGENCY)
Facility: OTHER | Age: 55
Discharge: ANOTHER HOSPITAL | End: 2020-01-10
Attending: PHYSICIAN ASSISTANT | Admitting: PHYSICIAN ASSISTANT
Payer: COMMERCIAL

## 2020-01-09 ENCOUNTER — APPOINTMENT (OUTPATIENT)
Dept: CT IMAGING | Facility: OTHER | Age: 55
End: 2020-01-09
Attending: PHYSICIAN ASSISTANT
Payer: COMMERCIAL

## 2020-01-09 VITALS
RESPIRATION RATE: 16 BRPM | DIASTOLIC BLOOD PRESSURE: 70 MMHG | BODY MASS INDEX: 22.49 KG/M2 | SYSTOLIC BLOOD PRESSURE: 110 MMHG | TEMPERATURE: 96.5 F | WEIGHT: 135 LBS | HEART RATE: 68 BPM | HEIGHT: 65 IN | OXYGEN SATURATION: 97 %

## 2020-01-09 DIAGNOSIS — Z79.01 LONG TERM CURRENT USE OF ANTICOAGULANT THERAPY: ICD-10-CM

## 2020-01-09 DIAGNOSIS — E87.6 HYPOKALEMIA: ICD-10-CM

## 2020-01-09 DIAGNOSIS — S32.402A CLOSED DISPLACED FRACTURE OF LEFT ACETABULUM, UNSPECIFIED PORTION OF ACETABULUM, INITIAL ENCOUNTER (H): ICD-10-CM

## 2020-01-09 DIAGNOSIS — N94.89 PELVIC HEMATOMA, FEMALE: ICD-10-CM

## 2020-01-09 LAB
ABO + RH BLD: NORMAL
ABO + RH BLD: NORMAL
ANION GAP SERPL CALCULATED.3IONS-SCNC: 10 MMOL/L (ref 3–14)
BASOPHILS # BLD AUTO: 0 10E9/L (ref 0–0.2)
BASOPHILS NFR BLD AUTO: 0.4 %
BLD GP AB SCN SERPL QL: NORMAL
BLOOD BANK CMNT PATIENT-IMP: NORMAL
BUN SERPL-MCNC: 13 MG/DL (ref 7–25)
CALCIUM SERPL-MCNC: 9.4 MG/DL (ref 8.6–10.3)
CHLORIDE SERPL-SCNC: 102 MMOL/L (ref 98–107)
CO2 SERPL-SCNC: 25 MMOL/L (ref 21–31)
CREAT SERPL-MCNC: 0.67 MG/DL (ref 0.6–1.2)
DIFFERENTIAL METHOD BLD: NORMAL
EOSINOPHIL # BLD AUTO: 0.1 10E9/L (ref 0–0.7)
EOSINOPHIL NFR BLD AUTO: 0.9 %
ERYTHROCYTE [DISTWIDTH] IN BLOOD BY AUTOMATED COUNT: 13 % (ref 10–15)
GFR SERPL CREATININE-BSD FRML MDRD: >90 ML/MIN/{1.73_M2}
GLUCOSE SERPL-MCNC: 89 MG/DL (ref 70–105)
HCT VFR BLD AUTO: 36.9 % (ref 35–47)
HGB BLD-MCNC: 12.3 G/DL (ref 11.7–15.7)
IMM GRANULOCYTES # BLD: 0.1 10E9/L (ref 0–0.4)
IMM GRANULOCYTES NFR BLD: 0.7 %
LYMPHOCYTES # BLD AUTO: 1.2 10E9/L (ref 0.8–5.3)
LYMPHOCYTES NFR BLD AUTO: 11.7 %
MCH RBC QN AUTO: 31.6 PG (ref 26.5–33)
MCHC RBC AUTO-ENTMCNC: 33.3 G/DL (ref 31.5–36.5)
MCV RBC AUTO: 95 FL (ref 78–100)
MONOCYTES # BLD AUTO: 0.6 10E9/L (ref 0–1.3)
MONOCYTES NFR BLD AUTO: 6.1 %
NEUTROPHILS # BLD AUTO: 8 10E9/L (ref 1.6–8.3)
NEUTROPHILS NFR BLD AUTO: 80.2 %
PLATELET # BLD AUTO: 200 10E9/L (ref 150–450)
POTASSIUM SERPL-SCNC: 2.8 MMOL/L (ref 3.5–5.1)
RBC # BLD AUTO: 3.89 10E12/L (ref 3.8–5.2)
SODIUM SERPL-SCNC: 137 MMOL/L (ref 134–144)
SPECIMEN EXP DATE BLD: NORMAL
WBC # BLD AUTO: 10 10E9/L (ref 4–11)

## 2020-01-09 PROCEDURE — 74177 CT ABD & PELVIS W/CONTRAST: CPT

## 2020-01-09 PROCEDURE — 96375 TX/PRO/DX INJ NEW DRUG ADDON: CPT | Performed by: PHYSICIAN ASSISTANT

## 2020-01-09 PROCEDURE — 96376 TX/PRO/DX INJ SAME DRUG ADON: CPT | Performed by: PHYSICIAN ASSISTANT

## 2020-01-09 PROCEDURE — 99291 CRITICAL CARE FIRST HOUR: CPT | Mod: Z6 | Performed by: PHYSICIAN ASSISTANT

## 2020-01-09 PROCEDURE — 96368 THER/DIAG CONCURRENT INF: CPT | Performed by: PHYSICIAN ASSISTANT

## 2020-01-09 PROCEDURE — 99291 CRITICAL CARE FIRST HOUR: CPT | Mod: 25 | Performed by: PHYSICIAN ASSISTANT

## 2020-01-09 PROCEDURE — 72125 CT NECK SPINE W/O DYE: CPT | Mod: TC

## 2020-01-09 PROCEDURE — 86900 BLOOD TYPING SEROLOGIC ABO: CPT | Performed by: PHYSICIAN ASSISTANT

## 2020-01-09 PROCEDURE — 86850 RBC ANTIBODY SCREEN: CPT | Performed by: PHYSICIAN ASSISTANT

## 2020-01-09 PROCEDURE — 80048 BASIC METABOLIC PNL TOTAL CA: CPT | Performed by: PHYSICIAN ASSISTANT

## 2020-01-09 PROCEDURE — 96365 THER/PROPH/DIAG IV INF INIT: CPT | Performed by: PHYSICIAN ASSISTANT

## 2020-01-09 PROCEDURE — 70450 CT HEAD/BRAIN W/O DYE: CPT | Mod: TC

## 2020-01-09 PROCEDURE — 86901 BLOOD TYPING SEROLOGIC RH(D): CPT | Performed by: PHYSICIAN ASSISTANT

## 2020-01-09 PROCEDURE — 85025 COMPLETE CBC W/AUTO DIFF WBC: CPT | Performed by: PHYSICIAN ASSISTANT

## 2020-01-09 PROCEDURE — 36415 COLL VENOUS BLD VENIPUNCTURE: CPT | Performed by: PHYSICIAN ASSISTANT

## 2020-01-09 PROCEDURE — 25000128 H RX IP 250 OP 636: Performed by: PHYSICIAN ASSISTANT

## 2020-01-09 PROCEDURE — 25800030 ZZH RX IP 258 OP 636: Performed by: PHYSICIAN ASSISTANT

## 2020-01-09 PROCEDURE — 71045 X-RAY EXAM CHEST 1 VIEW: CPT | Mod: TC

## 2020-01-09 PROCEDURE — 25500064 ZZH RX 255 OP 636: Performed by: PHYSICIAN ASSISTANT

## 2020-01-09 RX ORDER — FENTANYL CITRATE 50 UG/ML
50 INJECTION, SOLUTION INTRAMUSCULAR; INTRAVENOUS ONCE
Status: COMPLETED | OUTPATIENT
Start: 2020-01-09 | End: 2020-01-09

## 2020-01-09 RX ORDER — MORPHINE SULFATE 4 MG/ML
4 INJECTION, SOLUTION INTRAMUSCULAR; INTRAVENOUS ONCE
Status: COMPLETED | OUTPATIENT
Start: 2020-01-09 | End: 2020-01-09

## 2020-01-09 RX ORDER — POTASSIUM CHLORIDE 7.45 MG/ML
10 INJECTION INTRAVENOUS ONCE
Status: COMPLETED | OUTPATIENT
Start: 2020-01-09 | End: 2020-01-10

## 2020-01-09 RX ORDER — SODIUM CHLORIDE 9 MG/ML
INJECTION, SOLUTION INTRAVENOUS CONTINUOUS
Status: DISCONTINUED | OUTPATIENT
Start: 2020-01-09 | End: 2020-01-10 | Stop reason: HOSPADM

## 2020-01-09 RX ADMIN — MORPHINE SULFATE 4 MG: 4 INJECTION, SOLUTION INTRAMUSCULAR; INTRAVENOUS at 22:19

## 2020-01-09 RX ADMIN — MORPHINE SULFATE 4 MG: 4 INJECTION, SOLUTION INTRAMUSCULAR; INTRAVENOUS at 21:46

## 2020-01-09 RX ADMIN — FENTANYL CITRATE 50 MCG: 50 INJECTION, SOLUTION INTRAMUSCULAR; INTRAVENOUS at 20:36

## 2020-01-09 RX ADMIN — SODIUM CHLORIDE: 9 INJECTION, SOLUTION INTRAVENOUS at 21:48

## 2020-01-09 RX ADMIN — POTASSIUM CHLORIDE 10 MEQ: 7.46 INJECTION, SOLUTION INTRAVENOUS at 23:38

## 2020-01-09 RX ADMIN — IOHEXOL 100 ML: 350 INJECTION, SOLUTION INTRAVENOUS at 21:07

## 2020-01-09 ASSESSMENT — MIFFLIN-ST. JEOR: SCORE: 1208.24

## 2020-01-10 ENCOUNTER — TRANSFERRED RECORDS (OUTPATIENT)
Dept: HEALTH INFORMATION MANAGEMENT | Facility: OTHER | Age: 55
End: 2020-01-10

## 2020-01-10 PROCEDURE — 25000555 ZZHC RX FACTOR IP 250 OP 636: Performed by: PHYSICIAN ASSISTANT

## 2020-01-10 PROCEDURE — 25000128 H RX IP 250 OP 636: Performed by: PHYSICIAN ASSISTANT

## 2020-01-10 PROCEDURE — C9132 KCENTRA, PER I.U.: HCPCS | Performed by: PHYSICIAN ASSISTANT

## 2020-01-10 RX ADMIN — PROTHROMBIN, COAGULATION FACTOR VII HUMAN, COAGULATION FACTOR IX HUMAN, COAGULATION FACTOR X HUMAN, PROTEIN C, PROTEIN S HUMAN, AND WATER 3000 UNITS: KIT at 00:12

## 2020-01-10 RX ADMIN — HYDROMORPHONE HYDROCHLORIDE 1 MG: 1 INJECTION, SOLUTION INTRAMUSCULAR; INTRAVENOUS; SUBCUTANEOUS at 00:09
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
## 2020-01-10 NOTE — ED NOTES
[facility] is here to transport patient.  They are putting a hip immobilizer on the patient.    Johana Benitez RN on 1/10/2020 at 12:32 AM
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
## 2020-01-24 ENCOUNTER — TRANSFERRED RECORDS (OUTPATIENT)
Dept: HEALTH INFORMATION MANAGEMENT | Facility: OTHER | Age: 55
End: 2020-01-24

## 2020-02-14 ENCOUNTER — TRANSFERRED RECORDS (OUTPATIENT)
Dept: HEALTH INFORMATION MANAGEMENT | Facility: OTHER | Age: 55
End: 2020-02-14

## 2020-02-17 DIAGNOSIS — S32.9XXA PELVIC FRACTURE (H): ICD-10-CM

## 2020-02-17 DIAGNOSIS — S32.409A ACETABULAR FRACTURE (H): Primary | ICD-10-CM

## 2020-02-26 ENCOUNTER — HOSPITAL ENCOUNTER (OUTPATIENT)
Dept: PHYSICAL THERAPY | Facility: OTHER | Age: 55
Setting detail: THERAPIES SERIES
End: 2020-02-26
Attending: ORTHOPAEDIC SURGERY
Payer: COMMERCIAL

## 2020-02-26 DIAGNOSIS — S32.409A ACETABULAR FRACTURE (H): ICD-10-CM

## 2020-02-26 DIAGNOSIS — S32.9XXA PELVIC FRACTURE (H): ICD-10-CM

## 2020-02-26 PROCEDURE — 97110 THERAPEUTIC EXERCISES: CPT | Mod: GP

## 2020-02-26 PROCEDURE — 97161 PT EVAL LOW COMPLEX 20 MIN: CPT | Mod: GP
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
## 2020-03-03 ENCOUNTER — HOSPITAL ENCOUNTER (OUTPATIENT)
Dept: PHYSICAL THERAPY | Facility: OTHER | Age: 55
Setting detail: THERAPIES SERIES
End: 2020-03-03
Attending: ORTHOPAEDIC SURGERY
Payer: COMMERCIAL

## 2020-03-03 PROCEDURE — 97110 THERAPEUTIC EXERCISES: CPT | Mod: GP

## 2020-05-15 ENCOUNTER — OFFICE VISIT (OUTPATIENT)
Dept: INTERNAL MEDICINE | Facility: OTHER | Age: 55
End: 2020-05-15
Attending: INTERNAL MEDICINE
Payer: COMMERCIAL

## 2020-05-15 ENCOUNTER — HOSPITAL ENCOUNTER (OUTPATIENT)
Dept: GENERAL RADIOLOGY | Facility: OTHER | Age: 55
End: 2020-05-15
Attending: INTERNAL MEDICINE
Payer: COMMERCIAL

## 2020-05-15 VITALS
WEIGHT: 141 LBS | SYSTOLIC BLOOD PRESSURE: 148 MMHG | BODY MASS INDEX: 23.46 KG/M2 | DIASTOLIC BLOOD PRESSURE: 96 MMHG | RESPIRATION RATE: 16 BRPM | TEMPERATURE: 98.7 F | HEART RATE: 72 BPM
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
## 2020-05-15 DIAGNOSIS — S22.31XA CLOSED FRACTURE OF ONE RIB OF RIGHT SIDE, INITIAL ENCOUNTER: ICD-10-CM

## 2020-05-15 PROCEDURE — 99213 OFFICE O/P EST LOW 20 MIN: CPT | Performed by: INTERNAL MEDICINE

## 2020-05-15 PROCEDURE — 71101 X-RAY EXAM UNILAT RIBS/CHEST: CPT | Mod: RT

## 2020-05-15 RX ORDER — OXYCODONE HYDROCHLORIDE 5 MG/1
5 TABLET ORAL EVERY 6 HOURS PRN
Qty: 30 TABLET | Refills: 0 | Status: SHIPPED | OUTPATIENT
Start: 2020-05-15 | End: 2020-06-01

## 2020-05-15 ASSESSMENT — ENCOUNTER SYMPTOMS
CONSTITUTIONAL NEGATIVE: 1
EYES NEGATIVE: 1
ENDOCRINE NEGATIVE: 1
ALLERGIC/IMMUNOLOGIC NEGATIVE: 1

## 2020-05-15 ASSESSMENT — PATIENT HEALTH QUESTIONNAIRE - PHQ9: SUM OF ALL RESPONSES TO PHQ QUESTIONS 1-9: 0

## 2020-05-15 ASSESSMENT — PAIN SCALES - GENERAL: PAINLEVEL: EXTREME PAIN (9)

## 2020-05-15 NOTE — LETTER
My Depression Action Plan  Name: Isabel Talbot   Date of Birth 1965  Date: 5/15/2020    My doctor: Bakari Guillen   My clinic: [facility]  [address]  [place] [id number] [phone]          GREEN    ZONE   Good Control    What it looks like:     Things are going generally well. You have normal ups and downs. You may even feel depressed from time to time, but bad moods usually last less than a day.   What you need to do:  1. Continue to care for yourself (see self care plan)  2. Check your depression survival kit and update it as needed  3. Follow your physician s recommendations including any medication.  4. Do not stop taking medication unless you consult with your physician first.           YELLOW         ZONE Getting Worse    What it looks like:     Depression is starting to interfere with your life.     It may be hard to get out of bed; you may be starting to isolate yourself from others.    Symptoms of depression are starting to last most all day and this has happened for several days.     You may have suicidal thoughts but they are not constant.   What you need to do:     1. Call your care team. Your response to treatment will improve if you keep your care team informed of your progress. Yellow periods are signs an adjustment may need to be made.     2. Continue your self-care.  Just get dressed and ready for the day.  Don't give yourself time to talk yourself out of it.    3. Talk to someone in your support network.    4. Open up your Depression Self-Care Plan/Wellness Kit.           RED    ZONE Medical Alert - Get Help    What it looks like:     Depression is seriously interfering with your life.     You may experience these or other symptoms: You can t get out of bed most days, can t work or engage in other necessary activities, you have trouble taking care of basic hygiene, or basic responsibilities, thoughts of suicide or death that will not go  away, self-injurious behavior.     What you need to do:  1. Call your care team and request a same-day appointment. If they are not available (weekends or after hours) call your local crisis line, emergency room or 911.            Depression Self-Care Plan / Wellness Kit    Self-Care for Depression  Here s the deal. Your body and mind are really not as separate as most people think.  What you do and think affects how you feel and how you feel influences what you do and think. This means if you do things that people who feel good do, it will help you feel better.  Sometimes this is all it takes.  There is also a place for medication and therapy depending on how severe your depression is, so be sure to consult with your medical provider and/ or Behavioral Health Consultant if your symptoms are worsening or not improving.     In order to better manage my stress, I will:    Exercise  Get some form of exercise, every day. This will help reduce pain and release endorphins, the  feel good  chemicals in your brain. This is almost as good as taking antidepressants!  This is not the same as joining a gym and then never going! (they count on that by the way ) It can be as simple as just going for a walk or doing some gardening, anything that will get you moving.      Hygiene   Maintain good hygiene (get out of bed in the morning, make your bed, brush your teeth, take a shower, and get dressed like you were going to work, even if you are unemployed).  If your clothes don't fit try to get ones that do.    Diet  Strive to eat foods that are good for me, drink plenty of water, and avoid excessive sugar, caffeine, alcohol, and other mood-altering substances.  Some foods that are helpful in depression are: complex carbohydrates, B vitamins, flaxseed, fish or fish oil, fresh fruits and vegetables.    Psychotherapy  Agree to participate in Individual Therapy (if recommended).    Medication  If prescribed medications, I agree to take  them.  Missing doses can result in serious side effects.  I understand that drinking alcohol, or other illicit drug use, may cause potential side effects.  I will not stop my medication abruptly without first discussing it with my provider.    Staying Connected With Others  Stay in touch with my friends, family members, and my primary care provider/team.    Use your imagination  Be creative.  We all have a creative side; it doesn t matter if it s oil painting, sand castles, or mud pies! This will also kick up the endorphins.    Witness Beauty  (AKA stop and smell the roses) Take a look outside, even in mid-winter. Notice colors, textures. Watch the squirrels and birds.     Service to others  Be of service to others.  There is always someone else in need.  By helping others we can  get out of ourselves  and remember the really important things.  This also provides opportunities for practicing all the other parts of the program.    Humor  Laugh and be silly!  Adjust your TV habits for less news and crime-drama and more comedy.    Control your stress  Try breathing deep, massage therapy, biofeedback, and meditation. Find time to relax each day.     Crisis Text Line  http://www.crisistextline.org    The Crisis Text Line serves anyone, in any type of crisis, providing access to free, 24/7 support and information via the medium people already use and trust:    Here's how it works:  1.  Text 966-217 from anywhere in the [place], anytime, about any type of crisis.  2.  A live, trained Crisis Counselor receives the text and responds quickly.  3.  The volunteer Crisis Counselor will help you move from a 'hot moment to a cool moment'.    My support system    Clinic Contact:  Phone number:    Contact 1:  Phone number:    Contact 2:  Phone number:    Confucianism/:  Phone number:    Therapist:  Phone number:    Local crisis center:    Phone number:    Other community support:  Phone number:

## 2020-05-15 NOTE — LETTER
May 18, 2020        Isabel Talbot  [address] [address]        To whom it may concern:    Please be advised that I evaluated this patient in the clinic on May 15, 2020.  Due to an injury, she is currently unable to work.    Sincerely,        Joshua Becerra MD  Internal Medicine  [facility]

## 2020-05-15 NOTE — PROGRESS NOTES
Chief Complaint:  Fall with rib injury.    HPI: This patient is in today after having fallen last night.  She slipped in the living room and landed on her right side.  Her pain is mostly posterior lateral in the rib area.  It hurts to move and it hurts to take a big breath.  No other injuries.  She is here to have this evaluated further.    I tried to reconcile her medications today.  She is uncertain whether she is still on the diltiazem.    Past Medical History:   Diagnosis Date     Benign essential hypertension 3/13/2018     Encounter for tobacco use cessation counseling 3/13/2018     Hypovitaminosis D 3/1/2018     Paroxysmal atrial fibrillation (H) 3/1/2018     Rheumatoid arthritis, involving unspecified site, unspecified rheumatoid factor presence (H) 3/1/2018       Past Surgical History:   Procedure Laterality Date     COLONOSCOPY N/A 4/2/2018    F/U 2028     COLONOSCOPY  09/04/2019    [facility] follow up 10 years, normal, 9/4/29     HAND SURGERY      s/p fall - age 19 or 20       Allergies   Allergen Reactions     Losartan Angioedema and Rash     Possible angioedema on 10/21/19 - patient to follow up with dermatology for confirmation  Previously entered as rash to hands and feet noted 7/17/18 (this allergy was deleted on 8/8/18)     Ace Inhibitors Cough     Cough     Metoprolol Other (See Comments)     Low heart rate       Current Outpatient Medications   Medication Sig Dispense Refill     amLODIPine (NORVASC) 10 MG tablet Take 1 tablet (10 mg) by mouth daily 60 tablet 3     apixaban ANTICOAGULANT (ELIQUIS) 5 MG tablet Take 1 tablet (5 mg) by mouth 2 times daily 60 tablet 11     citalopram (CELEXA) 10 MG tablet Take 1 tablet (10 mg) by mouth daily 90 tablet 1     diltiazem ER (CARDIZEM SR) 60 MG 12 hr capsule Take 1 capsule (60 mg) by mouth 2 times daily 60 capsule 11     EPINEPHrine (EPIPEN/ADRENACLICK/OR ANY BX GENERIC EQUIV) 0.3 MG/0.3ML injection 2-pack Inject 0.3 mLs (0.3 mg) into the muscle as needed for  anaphylaxis 1 each 3     etanercept (ENBREL) 50 MG/ML injection Inject 0.98 mLs (50 mg) Subcutaneous once a week       fluocinonide (LIDEX) 0.05 % external cream APPLY TO ACTIVE PSORIASIS ON THE BODY TWICE DAILY  2     oxyCODONE (ROXICODONE) 5 MG tablet Take 1 tablet (5 mg) by mouth every 6 hours as needed for severe pain 30 tablet 0     propafenone (RYTHMOL) 150 MG TABS tablet Take 1 tablet (150 mg) by mouth 3 times daily 90 tablet 11     rosuvastatin (CRESTOR) 20 MG tablet Take 1 tablet (20 mg) by mouth At Bedtime 90 tablet 3       Review of Systems   Constitutional: Negative.    Eyes: Negative.    Endocrine: Negative.    Allergic/Immunologic: Negative.        Physical Exam  Vitals signs and nursing note reviewed.   Constitutional:       General: She is not in acute distress.     Appearance: Normal appearance. She is not ill-appearing, toxic-appearing or diaphoretic.      Comments: Standing rather than sitting due to discomfort   Pulmonary:       Neurological:      Mental Status: She is alert.         Assessment:        ICD-10-CM    1. Fall, initial encounter  W19.XXXA XR Ribs & Chest Rt 3vw   2. Closed fracture of one rib of right side, initial encounter  S22.31XA oxyCODONE (ROXICODONE) 5 MG tablet       Plan: X-ray reveals a single rib fracture.  This was reviewed with the patient.  Symptomatic measures were advised as well as prescription for oxycodone to use sparingly.  Warned of possible side effects.  As this is her second fracture in less than a year I recommended that she have a bone density test, she will consider having this done.  Follow-up depending on her clinical course.

## 2020-05-15 NOTE — NURSING NOTE
Isabel HUERTA Antione is a 55 year old female presenting today for: fell on right side, pain in right side rib area  Medication Reconciliation: complete    Trinidad Marquez LPN 5/15/2020 11:14 AM

## 2020-05-18 ENCOUNTER — TRANSFERRED RECORDS (OUTPATIENT)
Dept: HEALTH INFORMATION MANAGEMENT | Facility: OTHER | Age: 55
End: 2020-05-18

## 2020-05-18 ENCOUNTER — TELEPHONE (OUTPATIENT)
Dept: INTERNAL MEDICINE | Facility: OTHER | Age: 55
End: 2020-05-18

## 2020-05-18 NOTE — TELEPHONE ENCOUNTER
After the patient's full name and date of birth was verified, the patient was notified of the below information.    Patient chose to have letter at  counter.  Letter placed in [address]  folders.  Trinidad Marquez LPN on 5/18/2020 at 12:57 PM

## 2020-05-18 NOTE — TELEPHONE ENCOUNTER
MAF-Patient called and stated she saw TANA Friday, May 15 and has a broken rib. She states there is no way she can go back to work yet and is wondering if she could get a letter for work. Please call to advise.  Jones Garcia on 5/18/2020 at 8:39 AM

## 2020-06-01 ENCOUNTER — VIRTUAL VISIT (OUTPATIENT)
Dept: INTERNAL MEDICINE | Facility: OTHER | Age: 55
End: 2020-06-01
Attending: INTERNAL MEDICINE
Payer: COMMERCIAL

## 2020-06-01 DIAGNOSIS — S22.31XA CLOSED FRACTURE OF ONE RIB OF RIGHT SIDE, INITIAL ENCOUNTER: Primary | ICD-10-CM

## 2020-06-01 DIAGNOSIS — M06.9 RHEUMATOID ARTHRITIS, INVOLVING UNSPECIFIED SITE, UNSPECIFIED RHEUMATOID FACTOR PRESENCE: ICD-10-CM

## 2020-06-01 PROCEDURE — 99212 OFFICE O/P EST SF 10 MIN: CPT | Mod: TEL | Performed by: INTERNAL MEDICINE

## 2020-06-01 RX ORDER — OXYCODONE HYDROCHLORIDE 5 MG/1
5 TABLET ORAL EVERY 6 HOURS PRN
Qty: 20 TABLET | Refills: 0 | Status: SHIPPED | OUTPATIENT
Start: 2020-06-01 | End: 2020-09-29

## 2020-06-01 NOTE — PROGRESS NOTES
"Isabel Talbot is a 55 year old female who is being evaluated via a billable telephone visit.      The patient has been notified of following:     \"This telephone visit will be conducted via a call between you and your physician/provider. We have found that certain health care needs can be provided without the need for a physical exam.  This service lets us provide the care you need with a short phone conversation.  If a prescription is necessary we can send it directly to your pharmacy.  If lab work is needed we can place an order for that and you can then stop by our lab to have the test done at a later time.    Telephone visits are billed at different rates depending on your insurance coverage. During this emergency period, for some insurers they may be billed the same as an in-person visit.  Please reach out to your insurance provider with any questions.    If during the course of the call the physician/provider feels a telephone visit is not appropriate, you will not be charged for this service.\"    Patient has given verbal consent for Telephone visit?  Yes      Subjective     Isabel Talbot is a 55 year old female who presents via phone visit today for the following health issues:    HPI    Telephone visit with the patient today.  She is now 2 weeks into her rib fracture.  She is taking 2 oxycodone daily.  She still has pain and wonders if she could get a refill on the oxycodone for another week or 2.    We also reviewed her meds.  She is no longer on the diltiazem so that was taken off of her med list.    I also talked to her about a bone density test now that she has had 2 fractures.  She is interested in getting that done.  Reviewed and updated as needed this visit by Provider  Tobacco  Allergies  Meds  Problems  Med Hx  Surg Hx  Fam Hx         Review of Systems          Objective   Reported vitals:  There were no vitals taken for this visit.     PSYCH: Alert and oriented times 3; coherent " speech, normal   rate and volume, able to articulate logical thoughts, able   to abstract reason, no tangential thoughts, no hallucinations   or delusions    RESP: No cough, no audible wheezing, able to talk in full sentences  Remainder of exam unable to be completed due to telephone visits          Assessment/Plan:  1. Closed fracture of one rib of right side, initial encounter    - oxyCODONE (ROXICODONE) 5 MG tablet; Take 1 tablet (5 mg) by mouth every 6 hours as needed for severe pain  Dispense: 20 tablet; Refill: 0  - DX Hip/Pelvis/Spine; Future    2. Rheumatoid arthritis, involving unspecified site, unspecified rheumatoid factor presence (H)    - DX Hip/Pelvis/Spine; Future    I gave the patient another 20 tablets of oxycodone.  Of note is that she is not having any side effects with this.  Diltiazem was taken off of her med list.  Bone density test is scheduled because of her history and the fact that she has had 2 fractures now.    No follow-ups on file.      Phone call duration:  5 minutes    AKBAR STONE MD

## 2020-06-02 DIAGNOSIS — F32.1 CURRENT MODERATE EPISODE OF MAJOR DEPRESSIVE DISORDER WITHOUT PRIOR EPISODE (H): ICD-10-CM

## 2020-06-02 RX ORDER — CITALOPRAM HYDROBROMIDE 10 MG/1
TABLET ORAL
Qty: 90 TABLET | Refills: 3 | Status: SHIPPED | OUTPATIENT
Start: 2020-06-02 | End: 2021-06-01

## 2020-06-02 NOTE — TELEPHONE ENCOUNTER
Routing refill request to provider for review/approval because:  Patient needs to be seen because:  6 month visit    LOV: 5/15/2020  Domitila Mcguire RN on 6/2/2020 at 3:58 PM

## 2020-06-16 DIAGNOSIS — T78.3XXA ANGIOEDEMA, INITIAL ENCOUNTER: ICD-10-CM

## 2020-06-18 RX ORDER — AMLODIPINE BESYLATE 10 MG/1
TABLET ORAL
Qty: 90 TABLET | Refills: 3 | Status: SHIPPED | OUTPATIENT
Start: 2020-06-18 | End: 2021-06-29

## 2020-06-18 NOTE — TELEPHONE ENCOUNTER
Routing refill request to provider for review/approval because:  Blood pressure under 140/90 in past 12 months         BP Readings from Last 3 Encounters:   05/15/20 (!) 148/96   01/09/20 110/70   11/25/19 132/70        LOV : virtual 6/1/2020 with Dr. Becerra.    Domitila Mcguire RN on 6/18/2020 at 12:22 PM

## 2020-06-22 ENCOUNTER — HOSPITAL ENCOUNTER (OUTPATIENT)
Dept: BONE DENSITY | Facility: OTHER | Age: 55
Discharge: HOME | End: 2020-06-22
Attending: INTERNAL MEDICINE | Admitting: INTERNAL MEDICINE
Payer: COMMERCIAL

## 2020-06-22 DIAGNOSIS — S22.31XA CLOSED FRACTURE OF ONE RIB OF RIGHT SIDE, INITIAL ENCOUNTER: ICD-10-CM

## 2020-06-22 DIAGNOSIS — M06.9 RHEUMATOID ARTHRITIS, INVOLVING UNSPECIFIED SITE, UNSPECIFIED RHEUMATOID FACTOR PRESENCE: ICD-10-CM

## 2020-06-22 PROCEDURE — 77080 DXA BONE DENSITY AXIAL: CPT

## 2020-08-13 DIAGNOSIS — I48.0 PAROXYSMAL ATRIAL FIBRILLATION (H): ICD-10-CM

## 2020-08-13 DIAGNOSIS — I47.10 PSVT (PAROXYSMAL SUPRAVENTRICULAR TACHYCARDIA) (H): ICD-10-CM

## 2020-08-14 RX ORDER — APIXABAN 5 MG/1
TABLET, FILM COATED ORAL
Qty: 60 TABLET | Refills: 0 | Status: SHIPPED | OUTPATIENT
Start: 2020-08-14 | End: 2020-09-14

## 2020-08-14 NOTE — TELEPHONE ENCOUNTER
Requested Prescriptions   Pending Prescriptions Disp Refills     ELIQUIS ANTICOAGULANT 5 MG tablet [Pharmacy Med Name: Eliquis 5 MG Oral Tablet] 60 tablet 0     Sig: Take 1 tablet by mouth twice daily       Direct Oral Anticoagulant Agents Failed - 8/13/2020 11:15 AM        Failed - Recent (6 mo) or future (30 days) visit within the authorizing provider's specialty        Passed - Normal Platelets on file in past 12 months     Recent Labs   Lab Test 01/09/20  2145                  Passed - Medication is active on med list        Passed - Patient is 18-79 years of age        Passed - Serum creatinine less than or equal to 1.4 on file in past 12 mos     Recent Labs   Lab Test 01/09/20  2145   CR 0.67       Ok to refill medication if creatinine is low          Passed - Weight is greater than 60 kg for the past year     Wt Readings from Last 3 Encounters:   05/15/20 64 kg (141 lb)   01/09/20 61.2 kg (135 lb)   11/25/19 62.4 kg (137 lb 9.6 oz)             Passed - No active pregnancy on record        Passed - No positive pregnancy test within past 12 months               Last Written Prescription Date:  07/25/2019  Last Fill Quantity: 60,   # refills: 11  Last Office Visit: 06/01/2020 with Joshua Becerra MD  Future Office visit:   None noted.  Unable to complete prescription refill per RN medication refill policy. Will route to provider for review and consideration.  Milvia Faith RN on 8/14/2020 at 1:52 PM

## 2020-08-31 ENCOUNTER — TELEPHONE (OUTPATIENT)
Dept: INTERNAL MEDICINE | Facility: OTHER | Age: 55
End: 2020-08-31

## 2020-09-11 DIAGNOSIS — R73.9 HYPERGLYCEMIA: ICD-10-CM

## 2020-09-11 DIAGNOSIS — E55.9 HYPOVITAMINOSIS D: Primary | ICD-10-CM

## 2020-09-11 DIAGNOSIS — Z79.899 ENCOUNTER FOR MONITORING ANTI-ARRHYTHMIC THERAPY: ICD-10-CM

## 2020-09-11 DIAGNOSIS — I47.10 PSVT (PAROXYSMAL SUPRAVENTRICULAR TACHYCARDIA) (H): ICD-10-CM

## 2020-09-11 DIAGNOSIS — D64.9 ANEMIA, UNSPECIFIED TYPE: ICD-10-CM

## 2020-09-11 DIAGNOSIS — I48.0 PAROXYSMAL ATRIAL FIBRILLATION (H): ICD-10-CM

## 2020-09-11 DIAGNOSIS — Z13.220 LIPID SCREENING: ICD-10-CM

## 2020-09-11 DIAGNOSIS — Z51.81 ENCOUNTER FOR MONITORING ANTI-ARRHYTHMIC THERAPY: ICD-10-CM

## 2020-09-11 NOTE — TELEPHONE ENCOUNTER
Requested Prescriptions   Pending Prescriptions Disp Refills     propafenone (RYTHMOL) 150 MG TABS tablet [Pharmacy Med Name: Propafenone HCl 150 MG Oral Tablet] 90 tablet 0     Sig: TAKE 1 TABLET BY MOUTH THREE TIMES DAILY       There is no refill protocol information for this order            Last Written Prescription Date:  07/25/2019  Last Fill Quantity: 90,   # refills: 11  Last Office Visit: 06/01/2020 with Joshua Becerra MD  07/25/2019 with Dallas Calix, DO  Future Office visit:   None noted.  Unable to complete prescription refill per RN medication refill policy. Will route to provider for review and consideration.  Milvia Faith RN on 9/11/2020 at 4:13 PM

## 2020-09-12 RX ORDER — PROPAFENONE HYDROCHLORIDE 150 MG/1
TABLET, COATED ORAL
Qty: 270 TABLET | Refills: 0 | Status: SHIPPED | OUTPATIENT
Start: 2020-09-12 | End: 2021-01-11

## 2020-09-14 RX ORDER — APIXABAN 5 MG/1
TABLET, FILM COATED ORAL
Qty: 180 TABLET | Refills: 3 | Status: SHIPPED | OUTPATIENT
Start: 2020-09-14 | End: 2021-07-20

## 2020-09-14 NOTE — TELEPHONE ENCOUNTER
[facility] sent Rx request for the following:   ELIQUIS ANTICOAGULANT 5 MG tablet   Sig: Take 1 tablet by mouth twice daily     Last Prescription Date:   08/14/2020  Last Fill Qty/Refills:         60, R-0    Last Office Visit:              06/01/2020   Future Office visit:           None, 09/29/2020 with Cardiology  Routing refill request to provider for review/approval because:  Patient needs to be seen because it has been more than 6 months since last office visit.    Unable to complete prescription refill per RN Medication Refill Policy.................... Adela Ríos RN ....................  9/14/2020   1:19 PM

## 2020-09-14 NOTE — TELEPHONE ENCOUNTER
Patient returned called scheduled for an appointment on 09/29/2020 at 0945 with dr. Calix.  Patient will come at 0900 and have labs drawn in clinic before her appointment along with an EKG.  Patient is aware of date and time.  Milvia Faith RN on 9/14/2020 at 10:07 AM    Left message on machine to call back.  Milvia Faith RN on 9/14/2020 at 9:27 AM    Given a 90 day supply of propanone. Needs EKG, labs, and follow-up. Orders for labs placed.     Dr. Calix

## 2020-09-18 DIAGNOSIS — E78.2 MIXED HYPERLIPIDEMIA: ICD-10-CM

## 2020-09-21 RX ORDER — ROSUVASTATIN CALCIUM 20 MG/1
TABLET, COATED ORAL
Qty: 90 TABLET | Refills: 0 | Status: SHIPPED | OUTPATIENT
Start: 2020-09-21 | End: 2020-12-18

## 2020-09-21 NOTE — TELEPHONE ENCOUNTER
"Requested Prescriptions   Pending Prescriptions Disp Refills     rosuvastatin (CRESTOR) 20 MG tablet [Pharmacy Med Name: Rosuvastatin Calcium 20 MG Oral Tablet] 90 tablet 0     Sig: TAKE 1 TABLET BY MOUTH ONCE DAILY AT BEDTIME       Statins Protocol Failed - 9/21/2020  9:11 AM        Failed - LDL on file in past 12 months     Recent Labs   Lab Test 07/25/19  1355   *             Passed - No abnormal creatine kinase in past 12 months     No lab results found.             Passed - Recent (12 mo) or future (30 days) visit within the authorizing provider's specialty     Patient has had an office visit with the authorizing provider or a provider within the authorizing providers department within the previous 12 mos or has a future within next 30 days. See \"Patient Info\" tab in inbasket, or \"Choose Columns\" in Meds & Orders section of the refill encounter.              Passed - Medication is active on med list        Passed - Patient is age 18 or older        Passed - No active pregnancy on record        Passed - No positive pregnancy test in past 12 months               Last Written Prescription Date:  07/25/2019  Last Fill Quantity: 90,   # refills: 3  Last Office Visit: 07/25/2019 with Dallas Calix DO  Future Office visit:    Next 5 appointments (look out 90 days)    Sep 29, 2020  9:45 AM CDT  Return Visit with Dallas Calix DO  [facility] ([facility] [address]  [place] [id number] [phone]      Unable to complete prescription refill per RN medication refill policy. Will route to provider for review and consideration.  Milvia Faith RN on 9/21/2020 at 9:18 AM              "

## 2020-09-29 ENCOUNTER — OFFICE VISIT (OUTPATIENT)
Dept: CARDIOLOGY | Facility: OTHER | Age: 55
End: 2020-09-29
Attending: INTERNAL MEDICINE
Payer: COMMERCIAL

## 2020-09-29 VITALS
DIASTOLIC BLOOD PRESSURE: 88 MMHG | WEIGHT: 136 LBS | TEMPERATURE: 96.8 F | SYSTOLIC BLOOD PRESSURE: 138 MMHG | BODY MASS INDEX: 22.66 KG/M2 | HEIGHT: 65 IN | OXYGEN SATURATION: 97 % | RESPIRATION RATE: 18 BRPM | HEART RATE: 54 BPM

## 2020-09-29 DIAGNOSIS — Z13.220 LIPID SCREENING: ICD-10-CM

## 2020-09-29 DIAGNOSIS — R73.9 HYPERGLYCEMIA: ICD-10-CM

## 2020-09-29 DIAGNOSIS — D64.9 ANEMIA, UNSPECIFIED TYPE: ICD-10-CM

## 2020-09-29 DIAGNOSIS — R00.1 BRADYCARDIA: ICD-10-CM

## 2020-09-29 DIAGNOSIS — Z51.81 ENCOUNTER FOR MONITORING ANTI-ARRHYTHMIC THERAPY: ICD-10-CM

## 2020-09-29 DIAGNOSIS — I48.0 PAROXYSMAL ATRIAL FIBRILLATION (H): ICD-10-CM

## 2020-09-29 DIAGNOSIS — Z79.899 ENCOUNTER FOR MONITORING ANTI-ARRHYTHMIC THERAPY: ICD-10-CM

## 2020-09-29 DIAGNOSIS — I48.0 PAROXYSMAL ATRIAL FIBRILLATION (H): Primary | ICD-10-CM

## 2020-09-29 DIAGNOSIS — I47.10 PSVT (PAROXYSMAL SUPRAVENTRICULAR TACHYCARDIA) (H): ICD-10-CM

## 2020-09-29 DIAGNOSIS — Z71.6 TOBACCO ABUSE COUNSELING: ICD-10-CM

## 2020-09-29 DIAGNOSIS — R00.2 PALPITATIONS: ICD-10-CM

## 2020-09-29 DIAGNOSIS — Z72.0 TOBACCO ABUSE: ICD-10-CM

## 2020-09-29 DIAGNOSIS — E55.9 HYPOVITAMINOSIS D: ICD-10-CM

## 2020-09-29 DIAGNOSIS — Z71.6 ENCOUNTER FOR TOBACCO USE CESSATION COUNSELING: ICD-10-CM

## 2020-09-29 DIAGNOSIS — I10 BENIGN ESSENTIAL HYPERTENSION: ICD-10-CM

## 2020-09-29 LAB
ALBUMIN SERPL-MCNC: 4.4 G/DL (ref 3.5–5.7)
ALP SERPL-CCNC: 64 U/L (ref 34–104)
ALT SERPL W P-5'-P-CCNC: 13 U/L (ref 7–52)
ANION GAP SERPL CALCULATED.3IONS-SCNC: 7 MMOL/L (ref 3–14)
AST SERPL W P-5'-P-CCNC: 18 U/L (ref 13–39)
BILIRUB SERPL-MCNC: 0.6 MG/DL (ref 0.3–1)
BUN SERPL-MCNC: 13 MG/DL (ref 7–25)
CALCIUM SERPL-MCNC: 9.9 MG/DL (ref 8.6–10.3)
CHLORIDE SERPL-SCNC: 102 MMOL/L (ref 98–107)
CHOLEST SERPL-MCNC: 179 MG/DL
CO2 SERPL-SCNC: 27 MMOL/L (ref 21–31)
CREAT SERPL-MCNC: 0.78 MG/DL (ref 0.6–1.2)
DEPRECATED CALCIDIOL+CALCIFEROL SERPL-MC: 36.4 NG/ML
ERYTHROCYTE [DISTWIDTH] IN BLOOD BY AUTOMATED COUNT: 13.1 % (ref 10–15)
GFR SERPL CREATININE-BSD FRML MDRD: 76 ML/MIN/{1.73_M2}
GLUCOSE SERPL-MCNC: 95 MG/DL (ref 70–105)
HBA1C MFR BLD: 5.2 % (ref 4–6)
HCT VFR BLD AUTO: 38.5 % (ref 35–47)
HDLC SERPL-MCNC: 87 MG/DL (ref 23–92)
HGB BLD-MCNC: 13.3 G/DL (ref 11.7–15.7)
INTERPRETATION ECG - MUSE: NORMAL
LDLC SERPL CALC-MCNC: 81 MG/DL
MAGNESIUM SERPL-MCNC: 1.9 MG/DL (ref 1.9–2.7)
MCH RBC QN AUTO: 31.8 PG (ref 26.5–33)
MCHC RBC AUTO-ENTMCNC: 34.5 G/DL (ref 31.5–36.5)
MCV RBC AUTO: 92 FL (ref 78–100)
NONHDLC SERPL-MCNC: 92 MG/DL
PLATELET # BLD AUTO: 244 10E9/L (ref 150–450)
POTASSIUM SERPL-SCNC: 3.6 MMOL/L (ref 3.5–5.1)
PROT SERPL-MCNC: 7.7 G/DL (ref 6.4–8.9)
RBC # BLD AUTO: 4.18 10E12/L (ref 3.8–5.2)
SODIUM SERPL-SCNC: 136 MMOL/L (ref 134–144)
TRIGL SERPL-MCNC: 54 MG/DL
TSH SERPL DL<=0.05 MIU/L-ACNC: 2.11 IU/ML (ref 0.34–5.6)
WBC # BLD AUTO: 4.4 10E9/L (ref 4–11)

## 2020-09-29 PROCEDURE — 82306 VITAMIN D 25 HYDROXY: CPT | Mod: ZL | Performed by: INTERNAL MEDICINE

## 2020-09-29 PROCEDURE — 84443 ASSAY THYROID STIM HORMONE: CPT | Mod: ZL | Performed by: INTERNAL MEDICINE

## 2020-09-29 PROCEDURE — 80061 LIPID PANEL: CPT | Mod: ZL | Performed by: INTERNAL MEDICINE

## 2020-09-29 PROCEDURE — 80053 COMPREHEN METABOLIC PANEL: CPT | Mod: ZL | Performed by: INTERNAL MEDICINE

## 2020-09-29 PROCEDURE — 83735 ASSAY OF MAGNESIUM: CPT | Mod: ZL | Performed by: INTERNAL MEDICINE

## 2020-09-29 PROCEDURE — 99214 OFFICE O/P EST MOD 30 MIN: CPT | Performed by: INTERNAL MEDICINE

## 2020-09-29 PROCEDURE — 93000 ELECTROCARDIOGRAM COMPLETE: CPT | Performed by: INTERNAL MEDICINE

## 2020-09-29 PROCEDURE — 83036 HEMOGLOBIN GLYCOSYLATED A1C: CPT | Mod: ZL | Performed by: INTERNAL MEDICINE

## 2020-09-29 PROCEDURE — 85027 COMPLETE CBC AUTOMATED: CPT | Mod: ZL | Performed by: INTERNAL MEDICINE

## 2020-09-29 PROCEDURE — 36415 COLL VENOUS BLD VENIPUNCTURE: CPT | Mod: ZL | Performed by: INTERNAL MEDICINE

## 2020-09-29 ASSESSMENT — MIFFLIN-ST. JEOR: SCORE: 1207.15

## 2020-09-29 ASSESSMENT — PAIN SCALES - GENERAL: PAINLEVEL: NO PAIN (0)

## 2020-09-29 NOTE — NURSING NOTE
"Patient comes in for annual follow up on medications and PAF.  Jing Arnold LPN ....................9/29/2020   10:00 AM  Chief Complaint   Patient presents with     Follow Up     annual- medication and PAF       Initial /88 (BP Location: Right arm, Patient Position: Sitting, Cuff Size: Adult Regular)   Pulse 54   Temp 96.8  F (36  C) (Tympanic)   Resp 18   Ht 1.65 m (5' 4.96\")   Wt 61.7 kg (136 lb)   SpO2 97%   BMI 22.66 kg/m   Estimated body mass index is 22.66 kg/m  as calculated from the following:    Height as of this encounter: 1.65 m (5' 4.96\").    Weight as of this encounter: 61.7 kg (136 lb).  Meds Reconciled: complete  Pt is not on Aspirin  Pt is on a Statin  PHQ and/or NELLY reviewed. Pt referred to PCP/MH Provider as appropriate.    Jing Arnold LPN      "
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
## 2020-12-18 DIAGNOSIS — E78.2 MIXED HYPERLIPIDEMIA: ICD-10-CM

## 2020-12-18 RX ORDER — ROSUVASTATIN CALCIUM 20 MG/1
TABLET, COATED ORAL
Qty: 90 TABLET | Refills: 3 | Status: SHIPPED | OUTPATIENT
Start: 2020-12-18 | End: 2021-07-20

## 2020-12-18 NOTE — TELEPHONE ENCOUNTER
"Requested Prescriptions   Pending Prescriptions Disp Refills     rosuvastatin (CRESTOR) 20 MG tablet [Pharmacy Med Name: Rosuvastatin Calcium 20 MG Oral Tablet] 90 tablet 0     Sig: TAKE 1 TABLET BY MOUTH ONCE DAILY AT BEDTIME       Statins Protocol Passed - 12/18/2020  6:42 AM        Passed - LDL on file in past 12 months     Recent Labs   Lab Test 09/29/20  0912   LDL 81             Passed - No abnormal creatine kinase in past 12 months     No lab results found.             Passed - Recent (12 mo) or future (30 days) visit within the authorizing provider's specialty     Patient has had an office visit with the authorizing provider or a provider within the authorizing providers department within the previous 12 mos or has a future within next 30 days. See \"Patient Info\" tab in inbasket, or \"Choose Columns\" in Meds & Orders section of the refill encounter.              Passed - Medication is active on med list        Passed - Patient is age 18 or older        Passed - No active pregnancy on record        Passed - No positive pregnancy test in past 12 months         Prescription approved per [facility] Refill Protocol.  Milvia Faith RN on 12/18/2020 at 7:49 AM        "

## 2021-01-11 DIAGNOSIS — Z51.81 ENCOUNTER FOR MONITORING ANTI-ARRHYTHMIC THERAPY: ICD-10-CM

## 2021-01-11 DIAGNOSIS — I48.0 PAROXYSMAL ATRIAL FIBRILLATION (H): ICD-10-CM

## 2021-01-11 DIAGNOSIS — R00.2 PALPITATIONS: Primary | ICD-10-CM

## 2021-01-11 DIAGNOSIS — Z79.899 ENCOUNTER FOR MONITORING ANTI-ARRHYTHMIC THERAPY: ICD-10-CM

## 2021-01-11 DIAGNOSIS — I47.10 PSVT (PAROXYSMAL SUPRAVENTRICULAR TACHYCARDIA) (H): ICD-10-CM

## 2021-01-11 RX ORDER — PROPAFENONE HYDROCHLORIDE 150 MG/1
TABLET, COATED ORAL
Qty: 270 TABLET | Refills: 3 | Status: SHIPPED | OUTPATIENT
Start: 2021-01-11 | End: 2021-07-20

## 2021-01-11 NOTE — TELEPHONE ENCOUNTER
Requested Prescriptions   Pending Prescriptions Disp Refills     propafenone (RYTHMOL) 150 MG TABS tablet [Pharmacy Med Name: Propafenone HCl 150 MG Oral Tablet] 270 tablet 0     Sig: TAKE 1 TABLET BY MOUTH THREE TIMES DAILY       There is no refill protocol information for this order            Last Written Prescription Date:  09/12/2020  Last Fill Quantity: 270,   # refills: 0  Last Office Visit: 09/29/2020 with Dallas Calix DO  Future Office visit: none noted.  Unable to complete prescription refill per RN medication refill policy. Will route to provider for review and consideration.  Milvia Faith RN on 1/11/2021 at 1:28 PM

## 2021-05-05 DIAGNOSIS — M06.9 RA (RHEUMATOID ARTHRITIS) (H): Primary | ICD-10-CM

## 2021-05-31 DIAGNOSIS — F32.1 CURRENT MODERATE EPISODE OF MAJOR DEPRESSIVE DISORDER WITHOUT PRIOR EPISODE (H): ICD-10-CM

## 2021-05-31 NOTE — LETTER
June 1, 2021      Isabel Talbot  [address] [facility] [address]        Dear Isabel,         A refill of citalopram (CELEXA) 10 MG tablet  has been requested by your pharmacy.  We noticed that it has been greater than 12 months since your last comprehensive visit and labs with Bakari Guillen MD.  A limited 90 day supply has been sent to your pharmacy at this time.    Additional refills require a medication management appointment.  Your health is very important to us.  Please call the clinic at [phone] to schedule your appointment.    Thank you,    The Refill Nurse  [facility]

## 2021-06-01 RX ORDER — CITALOPRAM HYDROBROMIDE 10 MG/1
TABLET ORAL
Qty: 90 TABLET | Refills: 0 | Status: SHIPPED | OUTPATIENT
Start: 2021-06-01 | End: 2021-07-20

## 2021-06-01 NOTE — TELEPHONE ENCOUNTER
"[facility] sent Rx request for the following:   citalopram (CELEXA) 10 MG tablet   Sig: Take 1 tablet by mouth once daily    Last Prescription Date:   06/02/2020  Last Fill Qty/Refills:         90, R-3    Last Office Visit:              06/01/2020 (Mariam)   Future Office visit:           None noted   SSRIs Protocol Failed - 5/31/2021  5:30 AM        Failed - PHQ-9 score less than 5 in past 6 months     Please review last PHQ-9 score.           Failed - Recent (6 mo) or future (30 days) visit within the authorizing provider's specialty     Patient had office visit in the last 6 months or has a visit in the next 30 days with authorizing provider or within the authorizing provider's specialty.  See \"Patient Info\" tab in inbasket, or \"Choose Columns\" in Meds & Orders section of the refill encounter.         Patient due for annual review. Letter sent. Prescription approved per [facility] Refill Protocol for 90 day shiv refill with notation made to requesting pharmacy. Samina Urrutia RN ....................  6/1/2021   3:54 PM      "

## 2021-07-20 ENCOUNTER — OFFICE VISIT (OUTPATIENT)
Dept: INTERNAL MEDICINE | Facility: OTHER | Age: 56
End: 2021-07-20
Attending: INTERNAL MEDICINE
Payer: COMMERCIAL

## 2021-07-20 VITALS
HEART RATE: 61 BPM | WEIGHT: 139.6 LBS | SYSTOLIC BLOOD PRESSURE: 132 MMHG | HEIGHT: 65 IN | OXYGEN SATURATION: 99 % | TEMPERATURE: 97.1 F | BODY MASS INDEX: 23.26 KG/M2 | RESPIRATION RATE: 16 BRPM | DIASTOLIC BLOOD PRESSURE: 78 MMHG

## 2021-07-20 DIAGNOSIS — F17.200 NICOTINE DEPENDENCE, UNCOMPLICATED, UNSPECIFIED NICOTINE PRODUCT TYPE: ICD-10-CM

## 2021-07-20 DIAGNOSIS — Z51.81 ENCOUNTER FOR MONITORING ANTI-ARRHYTHMIC THERAPY: ICD-10-CM

## 2021-07-20 DIAGNOSIS — Z79.899 ENCOUNTER FOR MONITORING ANTI-ARRHYTHMIC THERAPY: ICD-10-CM

## 2021-07-20 DIAGNOSIS — F32.5 MAJOR DEPRESSIVE DISORDER WITH SINGLE EPISODE, IN FULL REMISSION (H): ICD-10-CM

## 2021-07-20 DIAGNOSIS — I47.10 PSVT (PAROXYSMAL SUPRAVENTRICULAR TACHYCARDIA) (H): ICD-10-CM

## 2021-07-20 DIAGNOSIS — I48.0 PAROXYSMAL ATRIAL FIBRILLATION (H): ICD-10-CM

## 2021-07-20 DIAGNOSIS — E78.2 MIXED HYPERLIPIDEMIA: ICD-10-CM

## 2021-07-20 DIAGNOSIS — Z00.00 ANNUAL PHYSICAL EXAM: Primary | ICD-10-CM

## 2021-07-20 DIAGNOSIS — I10 BENIGN ESSENTIAL HYPERTENSION: ICD-10-CM

## 2021-07-20 PROCEDURE — 99406 BEHAV CHNG SMOKING 3-10 MIN: CPT | Performed by: INTERNAL MEDICINE

## 2021-07-20 PROCEDURE — 99396 PREV VISIT EST AGE 40-64: CPT | Mod: 25 | Performed by: INTERNAL MEDICINE

## 2021-07-20 RX ORDER — POLYETHYLENE GLYCOL 3350 17 G
2 POWDER IN PACKET (EA) ORAL
Qty: 168 LOZENGE | Refills: 11 | Status: SHIPPED | OUTPATIENT
Start: 2021-07-20 | End: 2022-03-24

## 2021-07-20 RX ORDER — AMLODIPINE BESYLATE 10 MG/1
10 TABLET ORAL DAILY
Qty: 90 TABLET | Refills: 3 | Status: SHIPPED | OUTPATIENT
Start: 2021-07-20 | End: 2022-07-07

## 2021-07-20 RX ORDER — PROPAFENONE HYDROCHLORIDE 150 MG/1
150 TABLET, COATED ORAL 3 TIMES DAILY
Qty: 270 TABLET | Refills: 3 | Status: SHIPPED | OUTPATIENT
Start: 2021-07-20 | End: 2022-08-16

## 2021-07-20 RX ORDER — ROSUVASTATIN CALCIUM 20 MG/1
20 TABLET, COATED ORAL DAILY
Qty: 90 TABLET | Refills: 3 | Status: SHIPPED | OUTPATIENT
Start: 2021-07-20 | End: 2022-08-16

## 2021-07-20 RX ORDER — NICOTINE 21 MG/24HR
1 PATCH, TRANSDERMAL 24 HOURS TRANSDERMAL EVERY 24 HOURS
Qty: 28 PATCH | Refills: 6 | Status: SHIPPED | OUTPATIENT
Start: 2021-07-20 | End: 2022-03-24

## 2021-07-20 RX ORDER — CITALOPRAM HYDROBROMIDE 20 MG/1
20 TABLET ORAL DAILY
Qty: 90 TABLET | Refills: 0 | Status: SHIPPED | OUTPATIENT
Start: 2021-07-20 | End: 2021-10-12

## 2021-07-20 ASSESSMENT — ENCOUNTER SYMPTOMS
SHORTNESS OF BREATH: 0
VOMITING: 0
LIGHT-HEADEDNESS: 0
WHEEZING: 0
ABDOMINAL PAIN: 0
ARTHRALGIAS: 0
NAUSEA: 0
CHILLS: 0
PALPITATIONS: 1
WOUND: 0
FEVER: 0
DYSURIA: 0
DIZZINESS: 0
CONFUSION: 0
MYALGIAS: 0
BRUISES/BLEEDS EASILY: 1
HEMATURIA: 0
DIARRHEA: 0
AGITATION: 0
COUGH: 0

## 2021-07-20 ASSESSMENT — PAIN SCALES - GENERAL: PAINLEVEL: NO PAIN (0)

## 2021-07-20 ASSESSMENT — PATIENT HEALTH QUESTIONNAIRE - PHQ9: SUM OF ALL RESPONSES TO PHQ QUESTIONS 1-9: 13

## 2021-07-20 ASSESSMENT — MIFFLIN-ST. JEOR: SCORE: 1224.71

## 2021-07-20 NOTE — PROGRESS NOTES
Medication Reconciliation: complete     FOOD SECURITY SCREENING QUESTIONS  Hunger Vital Signs:  Within the past 12 months we worried whether our food would run out before we got money to buy more. Never  Within the past 12 months the food we bought just didn't last and we didn't have money to get more. Never  Jazmin العراقي LPN .............7/20/2021  10:17 AM

## 2021-07-20 NOTE — PROGRESS NOTES
Ms. Talbot is a 56 year old female who presents to the clinic for evaluation of the following problems:      ICD-10-CM    1. Annual physical exam  Z00.00    2. Major depressive disorder with single episode, in full remission (H)  F32.5 citalopram (CELEXA) 20 MG tablet   3. Paroxysmal atrial fibrillation (H)  I48.0 apixaban ANTICOAGULANT (ELIQUIS ANTICOAGULANT) 5 MG tablet     propafenone (RYTHMOL) 150 MG TABS tablet   4. PSVT (paroxysmal supraventricular tachycardia) (H)  I47.1 apixaban ANTICOAGULANT (ELIQUIS ANTICOAGULANT) 5 MG tablet     propafenone (RYTHMOL) 150 MG TABS tablet   5. Nicotine dependence, uncomplicated, unspecified nicotine product type  F17.200 SMOKING CESSATION COUNSELING 3-10 MIN     nicotine (NICORETTE) 2 MG gum     nicotine (COMMIT) 2 MG lozenge     nicotine (NICOTROL) 10 MG inhaler     nicotine (NICODERM CQ) 14 MG/24HR 24 hr patch   6. Mixed hyperlipidemia  E78.2 rosuvastatin (CRESTOR) 20 MG tablet   7. Encounter for monitoring anti-arrhythmic therapy  Z51.81 propafenone (RYTHMOL) 150 MG TABS tablet    Z79.899    8. Benign essential hypertension  I10 amLODIPine (NORVASC) 10 MG tablet     HPI  Patient presents for annual physical.  Overall doing fairly well.  She has a number of questions today.    Major depression, reports her mood is doing fairly well but the virus pandemic has brought her spirits down.  She would like to make adjustment in her dose of Celexa from 10 mg daily up to 20 mg daily.    Paroxysmal supraventricular tachycardia/atrial fibrillation.  Continues with apixaban anticoagulation, Rythmol for rhythm control.  Seems to be doing well.  She has not seen her cardiologist in quite a while and would like refills today.    Tobacco use, ongoing.  She would like to quit smoking.  We discussed some options.  Uncertain what is covered.  Start with nicotine patch.  Use Nicotrol Inhaler lozenge or gum as needed for breakthrough symptoms.  She is not interested in trying Wellbutrin or  "Chantix.  She was on one of them in the past, and was not tolerated.  -3 minutes spent on tobacco cessation counseling.    Mixed hyperlipidemia, currently on Crestor 20 mg daily.  Seems to be tolerating well without side effects.  Needs refills.    Antiarrhythmic drug therapy, continues on Rythmol.  Needs refills.    Hypertension, blood pressure is currently well controlled.  Doing well with current medication regimen.  Needs medication refills.  Denies syncope or presyncope.  Continues with Norvasc, prescription sent to pharmacy.      Vaccine counseling completed today.    Functional Capacity: > 4 METS.   Review of Systems   Constitutional: Negative for chills and fever.   HENT: Negative for congestion and hearing loss.         + poor teeth   Eyes: Negative for visual disturbance.   Respiratory: Negative for cough, shortness of breath and wheezing.    Cardiovascular: Positive for palpitations. Negative for chest pain.        + Runs Bradycardic all the time.    Gastrointestinal: Negative for abdominal pain, diarrhea, nausea and vomiting.   Endocrine: Negative for cold intolerance and heat intolerance.   Genitourinary: Negative for dysuria and hematuria.   Musculoskeletal: Negative for arthralgias and myalgias.   Skin: Negative for rash and wound.   Allergic/Immunologic: Negative for immunocompromised state.   Neurological: Negative for dizziness and light-headedness.   Hematological: Bruises/bleeds easily.   Psychiatric/Behavioral: Negative for agitation and confusion.      Reviewed and updated as needed this visit by Provider   Tobacco  Allergies  Meds  Problems  Med Hx  Surg Hx  Fam Hx          EXAM:   Vitals:    07/20/21 1017   BP: 132/78   BP Location: Right arm   Patient Position: Sitting   Cuff Size: Adult Regular   Pulse: 61   Resp: 16   Temp: 97.1  F (36.2  C)   TempSrc: Tympanic   SpO2: 99%   Weight: 63.3 kg (139 lb 9.6 oz)   Height: 1.66 m (5' 5.35\")       Current Pain Score: No Pain (0)     BP " "Readings from Last 3 Encounters:   07/20/21 132/78   09/29/20 138/88   05/15/20 (!) 148/96      Wt Readings from Last 3 Encounters:   07/20/21 63.3 kg (139 lb 9.6 oz)   09/29/20 61.7 kg (136 lb)   05/15/20 64 kg (141 lb)      Estimated body mass index is 22.98 kg/m  as calculated from the following:    Height as of this encounter: 1.66 m (5' 5.35\").    Weight as of this encounter: 63.3 kg (139 lb 9.6 oz).     Physical Exam  Constitutional:       General: She is not in acute distress.     Appearance: She is well-developed. She is not diaphoretic.   HENT:      Head: Normocephalic and atraumatic.   Eyes:      General: No scleral icterus.     Conjunctiva/sclera: Conjunctivae normal.   Cardiovascular:      Rate and Rhythm: Regular rhythm.      Pulses: Normal pulses.      Comments: + bradycardia  Pulmonary:      Effort: Pulmonary effort is normal.   Abdominal:      Palpations: Abdomen is soft.      Tenderness: There is no abdominal tenderness.   Musculoskeletal:         General: No deformity.      Cervical back: Neck supple.      Right lower leg: No edema.      Left lower leg: No edema.   Lymphadenopathy:      Cervical: No cervical adenopathy.   Skin:     General: Skin is warm and dry.      Findings: No rash.   Neurological:      Mental Status: She is alert and oriented to person, place, and time. Mental status is at baseline.   Psychiatric:         Mood and Affect: Mood normal.         Behavior: Behavior normal.          Procedures   INVESTIGATIONS:  - Labs reviewed in Epic     ASSESSMENT AND PLAN:  Problem List Items Addressed This Visit        Nervous and Auditory    Nicotine dependence, uncomplicated, unspecified nicotine product type    Relevant Medications    nicotine (NICORETTE) 2 MG gum    nicotine (COMMIT) 2 MG lozenge    nicotine (NICOTROL) 10 MG inhaler    nicotine (NICODERM CQ) 14 MG/24HR 24 hr patch    Other Relevant Orders    SMOKING CESSATION COUNSELING 3-10 MIN (Completed)       Endocrine    Mixed " hyperlipidemia    Relevant Medications    rosuvastatin (CRESTOR) 20 MG tablet       Circulatory    Paroxysmal atrial fibrillation (H)    Relevant Medications    apixaban ANTICOAGULANT (ELIQUIS ANTICOAGULANT) 5 MG tablet    propafenone (RYTHMOL) 150 MG TABS tablet    Benign essential hypertension    Relevant Medications    amLODIPine (NORVASC) 10 MG tablet    PSVT (paroxysmal supraventricular tachycardia) (H)    Relevant Medications    apixaban ANTICOAGULANT (ELIQUIS ANTICOAGULANT) 5 MG tablet    propafenone (RYTHMOL) 150 MG TABS tablet       Behavioral    Current moderate episode of major depressive disorder without prior episode (H)    Relevant Medications    citalopram (CELEXA) 20 MG tablet       Other    Encounter for monitoring anti-arrhythmic therapy    Relevant Medications    propafenone (RYTHMOL) 150 MG TABS tablet      Other Visit Diagnoses     Annual physical exam    -  Primary        reviewed diet, exercise and weight control, very strongly urged to quit smoking to reduce cardiovascular risk  -- Expected clinical course discussed    -- Medications and their side effects discussed    The 10-year ASCVD risk score (Krys RUIZ Jr., et al., 2013) is: 4.9%    Values used to calculate the score:      Age: 56 years      Sex: Female      Is Non- : No      Diabetic: No      Tobacco smoker: Yes      Systolic Blood Pressure: 132 mmHg      Is BP treated: Yes      HDL Cholesterol: 87 mg/dL      Total Cholesterol: 179 mg/dL    Patient Instructions         Nicotine Gum (Nicorette, polacrilex nicotine gum)      Dosing:    >25 cigarettes per day Dose   Weeks 1-6 Chew 1 piece of 4 mg gum every 1-2 hours. Maximum of 24 pieces a day.   Weeks 7-9 Chew 1 piece of 4 mg gum every 2-4 hours. Maximum of 24 pieces a day.   Weeks 10-12 Chew 1 piece of 4 mg gum every 4-8 hours. Maximum of 24 pieces a day.   < 25 cigarettes per day    Weeks 1-6 Chew 1 piece of 2 mg gum every 1-2 hours. Maximum of 24 pieces a day.  "  Weeks 7-9 Chew 1 piece of 2 mg gum every 2-4 hours. Maximum of 24 pieces a day.   Weeks 10-12 Chew 1 piece of 2 mg gum every 4-8 hours. Maximum of 24 pieces a day.     How to use nicotine gum:    Chew the gum slowly until you notice a tingly feeling or peppery taste.     Then \"park\" the gum between your teeth and your cheek and let it sit there until you don't notice the tingly feeling or taste anymore.     Chew the gum slowly again until you notice the tingly feeling or peppery taste again and \"park\" the gum on the other side of your mouth.     Repeat this process for 30 minutes, then throw the gum away.     Especially at the beginning, use the gum whenever you would normally smoke a cigarette.    Some tips:    Do not smoke while you are using nicotine gum.     Do not swallow the gum.     Do not chew the gum like normal gum--you will swallow the nicotine instead of absorbing it. You are also more likely to get indigestion or nausea.     Do not drink anything, including water, while you are chewing gum.     Avoid acidic drinks like coffee and pop right before chewing the gum.     As your body becomes less dependent on nicotine, you will need to chew less gum.     Follow up with your health care provider if you have any questions and also to help taper your nicotine gum dose.    Side Effects:  Some people may experience some indigestion or nausea. Using proper chewing technique may help. If you experience any other troublesome or unusual side effects, call your health care provider.    Nicotine Inhaler    Dosing:    Weeks 1-12 Use 6-16 cartridges per day. Maximum of 16 cartridges per day.   Weeks 12-24 Gradually reduce the number of cartridges per day.     How to use the nicotine Inhaler:    Remove the mouthpiece from the plastic wrap and push the top and bottom pieces together. Turn them to line up the markings and pull the top and bottom apart.     Insert one cartridge into the inhaler. Push hard until it pops " "into place.    Line up the markings again and push the top and bottom back together.    Turn the pieces so the markings do not line up and the inhaler is \"locked\".    Inhale deeply into back of throat or puff in short breaths.    You will soon learn to be able to tell when no nicotine is left in the cartridge. Once you feel you are no longer getting any nicotine, change the cartridge.    Take off the top of the mouthpiece and throw away the cartridge.    Especially at the beginning, use the inhaler whenever you would normally smoke a cigarette.    Some tips:    Do not smoke while you are using the nicotine inhaler.    Each cartridge will last for 20 minutes of puffing.    Puff on the inhaler until your craving resolves. If used for less than 20 minutes, leave the cartridge in the inhaler and save for your next use.     Try different schedules to see what works best for you--try puffing for a full 20 minutes, or try puffing for 5 minutes at a time. The cartridge will last for FOUR 5 minute sessions.    Keep inhaler and cartridges out of reach of children.    Store the inhaler at room temperature.    Clean the mouthpiece regularly with soap and water.     As your body becomes less dependent on nicotine, you will need to use less cartridges.    Follow up with your health care professional about any questions you have and to get help with tapering you dose of the nicotine inhaler.    Side Effects:  Some people experience mild irritation of the mouth and throat in the first few days. These should resolve with time. If you experience any other troublesome or unusual side effects, call your health care professional.  Nicotine Lozenge    Dosing:  >25 cigarettes a day Dose   Weeks 1-6 Use one 4 mg lozenge every 1-2 hours. Maximum 20 lozenges per day.   Weeks 7-9 Use one 4 mg lozenge every 2-4 hours. Maximum 20 lozenges per day.   Weeks 10-12 Use one 4 mg lozenge every 4-8 hours. Maximum 20 lozenges per day.   <25 cigarettes " a day    Weeks 1-6 Use one 2 mg lozenge every 1-2 hours. Maximum 20 lozenges per day.   Weeks 7-9 Use one 4 mg lozenge every 2-4 hours. Maximum 20 lozenges per day.   Weeks 10-12 Use one 4 mg lozenge every 4-8 hours. Maximum 20 lozenges per day.     How to use the nicotine lozenge:    Place the lozenge in your mouth and allow it to slowly dissolve. It will be completely dissolved in about 20-30 minutes.     Do not chew or swallow the lozenge. Try to swallow as little as possible. It may help to hold the lozenge in the side of your mouth.     Occasionally move the lozenge from one side of your mouth to another.    You may feel a tingling sensation as you suck on the lozenge.    Especially at the beginning, use the lozenge whenever you would normally smoke a cigarette.    Some Tips:    Do not smoke while you are using the nicotine lozenge.     Do not use the nicotine lozenge like a normal lozenge. If you swallow, you will not absorb the nicotine and you may experience more side effects such as stomach upset.    Do not drink anything, even water, while you are using the lozenge or for 15 minutes before or after.    Do not use more than 1 lozenge at one time.    Do not use more than 5 lozenges in 6 hours.    Using at least 9 lozenges per day during your first 6 weeks will increase your chance of quitting.    As your body becomes less dependent on nicotine, you will need to use less lozenges.    Follow up with your health care professional if you have questions and also to help taper your nicotine lozenge dose.    Side Effects:  Some people may experience some indigestion or nausea. Using the lozenge properly may help. If you experience any other troublesome or unusual side effects, call your health care professional.    Nicotine Patch    Dosing:    >10 cigarettes per day Dose   Weeks 1-6 Use one 21 mg patch per day.   Weeks 7-8 Use one 14 mg patch per day.   Weeks 9-10 Use one 7 mg patch per day   <10 cigarettes per  day  Weeks 1-6 Use one 14 mg patch per day   Weeks 7-8 Use one 7 mg patch per day       How to use the Nicotine Patch:    Apply a new patch to non-hairy, clean, dry skin on the upper body or upper outer arm.  Remove backing from patch and press on skin.  Hold for 10 seconds.    Apply patch around the same time every day.    Wash your hands after applying the patch.    Remove the patch at the end of the day before you go to bed.    Apply a new patch the next morning.    Do not apply the patch to an area where you have worn a patch in the last week.   This will help prevent or reduce skin irritation.    Some Tips:    Do not smoke while you are using the nicotine patch!    Do not cut the nicotine patch -it will be ineffective.    Remove the patch prior to receiving an MRI since the patch may contain some metal.    Do not put the patch on irritated, cut, or burned skin.    If the patch falls off and cannot be reapplied, put on a new patch.    Follow -up with your health care professional if you have any questions and also to help taper your nicotine patch dose.    Side Effects:  Some people experience some skin irritation where the patch was placed.  Moving around the site where you are putting the patch each day should help.  If you experience any other troublesome or unusual side effects, call your health care professional.            HOW TO QUIT SMOKING  Smoking is one of the hardest habits to break. About half of all those who have ever smoked have been able to quit, and most of those (about 70%) who still smoke want to quit. Here are some of the best ways to stop smoking.     KEEP TRYING:  It takes most smokers about 8 tries before they are finally able to fully quit. So, the more often you try and fail, the better your chance of quitting the next time! So, don't give up!    GO COLD TURKEY:  Most ex-smokers quit cold turkey. Trying to cut back gradually doesn't seem to work as well, perhaps because it continues the  smoking habit. Also, it is possible to fool yourself by inhaling more while smoking fewer cigarettes. This results in the same amount of nicotine in your body!    GET SUPPORT:  Support programs can make an important difference, especially for the heavy smoker. These groups offer lectures, methods to change your behavior and peer support. Call the free national Quitline for more information. 800-QUIT-NOW (850-419-7250). Low-cost or free programs are offered by many hospitals, local chapters of the American Lung Association (100-725-0276) and the American Cancer Society (348-785-2065). Support at home is important too. Non-smokers can help by offering praise and encouragement. If the smoker fails to quit, encourage them to try again!    OVER-THE-COUNTER MEDICINES:  For those who can't quit on their own, Nicotine Replacement Therapy (NRT) may make quitting much easier. Certain aids such as the nicotine patch, gum and lozenge are available without a prescription. However, it is best to use these under the guidance of your doctor. The skin patch provides a steady supply of nicotine to the body. Nicotine gum and lozenge gives temporary bursts of low levels of nicotine. Both methods take the edge off the craving for cigarettes. WARNING: If you feel symptoms of nicotine overdose, such as nausea, vomiting, dizziness, weakness, or fast heartbeat, stop using these and see your doctor.    PRESCRIPTION MEDICINES:  After evaluating your smoking patterns and prior attempts at quitting, your doctor may offer a prescription medicine such as bupropion (Zyban, Wellbutrin), varenicline (Chantix, Champix), a niocotine inhaler or nasal spray. Each has its unique advantage and side effects which your doctor can review with you.    HEALTH BENEFITS OF QUITTING:  The benefits of quitting start right away and keep improving the longer you go without smokin minutes: blood pressure and pulse return to normal  8 hours: oxygen levels return  to normal  2 days: ability to smell and taste begins to improve as damaged nerves start to regrow  2-3 weeks: circulation and lung function improves  1-9 months: decreased cough, congestion and shortness of breath; less tired  1 year: risk of heart attack decreases by half  5 years: risk of lung cancer decreases by half; risk of stroke becomes the same as a non-smoker  For information about how to quit smoking, visit the following links:  [employer] ,   Clearing the Air, Quit Smoking Today   - an online booklet. http://www.smokefree.gov/pubs/clearing_the_air.pdf  Smokefree.gov http://smokefree.gov/  QuitNet http://www.quitnet.com/    5528-4624 [employer], [address] [place] [address], [place] [address]. All rights reserved. This information is not intended as a substitute for professional medical care. Always follow your healthcare professional's instructions.      Getting Support for Quitting Smoking  You don t have to go through the process of quitting smoking without support. Tell people you are quitting. The support of friends, coworkers, and family members can make a big difference. Face-to-face or telephone counseling can also be helpful, as can a stop-smoking class or an ex-smokers  group.    Set a quit date  If you re serious about quitting smoking, choose a date within the next 2 to 4 weeks. [place] it in bright, bold letters on a calendar you use often. Tell people about your quit date. Ask for their support. Let your friends and family know how they can help you quit.  Make a contract  A quit-smoking contract gives you a goal. Write out the contract and sign it. Have it witnessed, if you like. Then keep the contract where you ll see it often, or carry it with you. Read the contract when you re tempted to smoke.  Take action  On the day you quit, reread your quit contract. Think about the benefits you gain by quitting, such as better health and an improved sense of taste, as well as the money  you will save from not smoking. Also:    Remove cigarettes from your home, car, or any other place where you stash them.    Throw away all smoking materials, including matches, lighters, and ashtrays.    Review your list of triggers and your plan for coping with them.    Stay away from people or settings you link with smoking.    Make a quit kit that includes gum, mints, carrot sticks, and things to keep your hands and mouth busy.    Talk to your healthcare provider about using quit-smoking products, such as medicine or a nicotine patch, inhaler, nasal spray, gum, or lozenges.  Ask for help  Sometimes you may just need to talk when you miss smoking. Ex-smokers are good to talk to, because they re likely to know how you feel. You may need extra support in the first few weeks after you quit. Ask a friend to call you each day to see how you re doing. Telephone counseling can also help you keep on track. Ask your healthcare provider, local hospital, or public health department to put you in touch with a phone counselor. You may also have to deal with doubters when you decide to quit. Explain to any doubters why you are quitting. Tell them that quitting is important to you. Ask for their support. Tell your smoking buddies that you can walk together instead of smoking together. If someone thinks you won t succeed, say that you have a good quit plan and ask for their support. Let him or her know you re sticking with it.  To learn more  Get more tips from these resources:    www.cdc.gov/tobacco/quit_smoking/ 800-QUIT-NOW (131-335-1597)    www.smokefree.gov 877-44U-QUIT (142-572-8154)    www.lung.org/stop-smoking/ 800-LUNGUSA (243-917-2975)  [employer] last reviewed this educational content on 5/1/2019 2000-2021 [employer]. All rights reserved. This information is not intended as a substitute for professional medical care. Always follow your healthcare professional's instructions.    Start nicotine patch and  other meds to help with smoking cessation.   Increase dose of Celexa. For mood.     Immunization History   Administered Date(s) Administered     COVID-19,PF,Moderna 03/31/2021, 04/28/2021     Influenza Vaccine IM > 6 months Valent IIV4 10/01/2018, 10/01/2019     Influenza Vaccine, 6+MO IM (QUADRIVALENT W/PRESERVATIVES) 09/21/2020     TDAP Vaccine (Boostrix) 03/01/2018        -- Consider Annual Flu / Influenza vaccination - Every Fall (Starting about October 1st)    -- Get the new shingles shot (2 in series) (Shingrix) - from one of the local pharmacies, at your convenience. -- Check cost/coverage.   -- or -- If these are very expensive, go to the Local Public Health Department     Return in approximately 12 week(s), or sooner as needed for follow-up with Dr. Guillen.  - Follow-up Smoking / Depression    Clinic : [phone]  Appointment line: [phone]     Electronically signed by:  Bakari Guillen MD on 7/20/2021  Internal Medicine  [facility]

## 2021-07-20 NOTE — PATIENT INSTRUCTIONS
"    Nicotine Gum (Nicorette, polacrilex nicotine gum)      Dosing:    >25 cigarettes per day Dose   Weeks 1-6 Chew 1 piece of 4 mg gum every 1-2 hours. Maximum of 24 pieces a day.   Weeks 7-9 Chew 1 piece of 4 mg gum every 2-4 hours. Maximum of 24 pieces a day.   Weeks 10-12 Chew 1 piece of 4 mg gum every 4-8 hours. Maximum of 24 pieces a day.   < 25 cigarettes per day    Weeks 1-6 Chew 1 piece of 2 mg gum every 1-2 hours. Maximum of 24 pieces a day.   Weeks 7-9 Chew 1 piece of 2 mg gum every 2-4 hours. Maximum of 24 pieces a day.   Weeks 10-12 Chew 1 piece of 2 mg gum every 4-8 hours. Maximum of 24 pieces a day.     How to use nicotine gum:    Chew the gum slowly until you notice a tingly feeling or peppery taste.     Then \"park\" the gum between your teeth and your cheek and let it sit there until you don't notice the tingly feeling or taste anymore.     Chew the gum slowly again until you notice the tingly feeling or peppery taste again and \"park\" the gum on the other side of your mouth.     Repeat this process for 30 minutes, then throw the gum away.     Especially at the beginning, use the gum whenever you would normally smoke a cigarette.    Some tips:    Do not smoke while you are using nicotine gum.     Do not swallow the gum.     Do not chew the gum like normal gum--you will swallow the nicotine instead of absorbing it. You are also more likely to get indigestion or nausea.     Do not drink anything, including water, while you are chewing gum.     Avoid acidic drinks like coffee and pop right before chewing the gum.     As your body becomes less dependent on nicotine, you will need to chew less gum.     Follow up with your health care provider if you have any questions and also to help taper your nicotine gum dose.    Side Effects:  Some people may experience some indigestion or nausea. Using proper chewing technique may help. If you experience any other troublesome or unusual side effects, call your health " "care provider.    Nicotine Inhaler    Dosing:    Weeks 1-12 Use 6-16 cartridges per day. Maximum of 16 cartridges per day.   Weeks 12-24 Gradually reduce the number of cartridges per day.     How to use the nicotine Inhaler:    Remove the mouthpiece from the plastic wrap and push the top and bottom pieces together. Turn them to line up the markings and pull the top and bottom apart.     Insert one cartridge into the inhaler. Push hard until it pops into place.    Line up the markings again and push the top and bottom back together.    Turn the pieces so the markings do not line up and the inhaler is \"locked\".    Inhale deeply into back of throat or puff in short breaths.    You will soon learn to be able to tell when no nicotine is left in the cartridge. Once you feel you are no longer getting any nicotine, change the cartridge.    Take off the top of the mouthpiece and throw away the cartridge.    Especially at the beginning, use the inhaler whenever you would normally smoke a cigarette.    Some tips:    Do not smoke while you are using the nicotine inhaler.    Each cartridge will last for 20 minutes of puffing.    Puff on the inhaler until your craving resolves. If used for less than 20 minutes, leave the cartridge in the inhaler and save for your next use.     Try different schedules to see what works best for you--try puffing for a full 20 minutes, or try puffing for 5 minutes at a time. The cartridge will last for FOUR 5 minute sessions.    Keep inhaler and cartridges out of reach of children.    Store the inhaler at room temperature.    Clean the mouthpiece regularly with soap and water.     As your body becomes less dependent on nicotine, you will need to use less cartridges.    Follow up with your health care professional about any questions you have and to get help with tapering you dose of the nicotine inhaler.    Side Effects:  Some people experience mild irritation of the mouth and throat in the first " few days. These should resolve with time. If you experience any other troublesome or unusual side effects, call your health care professional.  Nicotine Lozenge    Dosing:  >25 cigarettes a day Dose   Weeks 1-6 Use one 4 mg lozenge every 1-2 hours. Maximum 20 lozenges per day.   Weeks 7-9 Use one 4 mg lozenge every 2-4 hours. Maximum 20 lozenges per day.   Weeks 10-12 Use one 4 mg lozenge every 4-8 hours. Maximum 20 lozenges per day.   <25 cigarettes a day    Weeks 1-6 Use one 2 mg lozenge every 1-2 hours. Maximum 20 lozenges per day.   Weeks 7-9 Use one 4 mg lozenge every 2-4 hours. Maximum 20 lozenges per day.   Weeks 10-12 Use one 4 mg lozenge every 4-8 hours. Maximum 20 lozenges per day.     How to use the nicotine lozenge:    Place the lozenge in your mouth and allow it to slowly dissolve. It will be completely dissolved in about 20-30 minutes.     Do not chew or swallow the lozenge. Try to swallow as little as possible. It may help to hold the lozenge in the side of your mouth.     Occasionally move the lozenge from one side of your mouth to another.    You may feel a tingling sensation as you suck on the lozenge.    Especially at the beginning, use the lozenge whenever you would normally smoke a cigarette.    Some Tips:    Do not smoke while you are using the nicotine lozenge.     Do not use the nicotine lozenge like a normal lozenge. If you swallow, you will not absorb the nicotine and you may experience more side effects such as stomach upset.    Do not drink anything, even water, while you are using the lozenge or for 15 minutes before or after.    Do not use more than 1 lozenge at one time.    Do not use more than 5 lozenges in 6 hours.    Using at least 9 lozenges per day during your first 6 weeks will increase your chance of quitting.    As your body becomes less dependent on nicotine, you will need to use less lozenges.    Follow up with your health care professional if you have questions and also to  help taper your nicotine lozenge dose.    Side Effects:  Some people may experience some indigestion or nausea. Using the lozenge properly may help. If you experience any other troublesome or unusual side effects, call your health care professional.    Nicotine Patch    Dosing:    >10 cigarettes per day Dose   Weeks 1-6 Use one 21 mg patch per day.   Weeks 7-8 Use one 14 mg patch per day.   Weeks 9-10 Use one 7 mg patch per day   <10 cigarettes per day  Weeks 1-6 Use one 14 mg patch per day   Weeks 7-8 Use one 7 mg patch per day       How to use the Nicotine Patch:    Apply a new patch to non-hairy, clean, dry skin on the upper body or upper outer arm.  Remove backing from patch and press on skin.  Hold for 10 seconds.    Apply patch around the same time every day.    Wash your hands after applying the patch.    Remove the patch at the end of the day before you go to bed.    Apply a new patch the next morning.    Do not apply the patch to an area where you have worn a patch in the last week.   This will help prevent or reduce skin irritation.    Some Tips:    Do not smoke while you are using the nicotine patch!    Do not cut the nicotine patch -it will be ineffective.    Remove the patch prior to receiving an MRI since the patch may contain some metal.    Do not put the patch on irritated, cut, or burned skin.    If the patch falls off and cannot be reapplied, put on a new patch.    Follow -up with your health care professional if you have any questions and also to help taper your nicotine patch dose.    Side Effects:  Some people experience some skin irritation where the patch was placed.  Moving around the site where you are putting the patch each day should help.  If you experience any other troublesome or unusual side effects, call your health care professional.            HOW TO QUIT SMOKING  Smoking is one of the hardest habits to break. About half of all those who have ever smoked have been able to quit, and  most of those (about 70%) who still smoke want to quit. Here are some of the best ways to stop smoking.     KEEP TRYING:  It takes most smokers about 8 tries before they are finally able to fully quit. So, the more often you try and fail, the better your chance of quitting the next time! So, don't give up!    GO COLD TURKEY:  Most ex-smokers quit cold turkey. Trying to cut back gradually doesn't seem to work as well, perhaps because it continues the smoking habit. Also, it is possible to fool yourself by inhaling more while smoking fewer cigarettes. This results in the same amount of nicotine in your body!    GET SUPPORT:  Support programs can make an important difference, especially for the heavy smoker. These groups offer lectures, methods to change your behavior and peer support. Call the free national Quitline for more information. 800-QUIT-NOW (637-352-7299). Low-cost or free programs are offered by many hospitals, local chapters of the American Lung Association (062-004-0689) and the American Cancer Society (578-086-9710). Support at home is important too. Non-smokers can help by offering praise and encouragement. If the smoker fails to quit, encourage them to try again!    OVER-THE-COUNTER MEDICINES:  For those who can't quit on their own, Nicotine Replacement Therapy (NRT) may make quitting much easier. Certain aids such as the nicotine patch, gum and lozenge are available without a prescription. However, it is best to use these under the guidance of your doctor. The skin patch provides a steady supply of nicotine to the body. Nicotine gum and lozenge gives temporary bursts of low levels of nicotine. Both methods take the edge off the craving for cigarettes. WARNING: If you feel symptoms of nicotine overdose, such as nausea, vomiting, dizziness, weakness, or fast heartbeat, stop using these and see your doctor.    PRESCRIPTION MEDICINES:  After evaluating your smoking patterns and prior attempts at quitting,  your doctor may offer a prescription medicine such as bupropion (Zyban, Wellbutrin), varenicline (Chantix, Champix), a niocotine inhaler or nasal spray. Each has its unique advantage and side effects which your doctor can review with you.    HEALTH BENEFITS OF QUITTING:  The benefits of quitting start right away and keep improving the longer you go without smokin minutes: blood pressure and pulse return to normal  8 hours: oxygen levels return to normal  2 days: ability to smell and taste begins to improve as damaged nerves start to regrow  2-3 weeks: circulation and lung function improves  1-9 months: decreased cough, congestion and shortness of breath; less tired  1 year: risk of heart attack decreases by half  5 years: risk of lung cancer decreases by half; risk of stroke becomes the same as a non-smoker  For information about how to quit smoking, visit the following links:  National [employer] [place] ,   Clearing the Air, Quit Smoking Today   - an online booklet. http://www.smokefree.gov/pubs/clearing_the_air.pdf  Smokefree.gov http://smokefree.gov/  QuitNet http://www.quitnet.com/    8828-4285 [employer] [facility], [address] [place] [address]. All rights reserved. This information is not intended as a substitute for professional medical care. Always follow your healthcare professional's instructions.      Getting Support for Quitting Smoking  You don t have to go through the process of quitting smoking without support. Tell people you are quitting. The support of friends, coworkers, and family members can make a big difference. Face-to-face or telephone counseling can also be helpful, as can a stop-smoking class or an ex-smokers  group.    Set a quit date  If you re serious about quitting smoking, choose a date within the next 2 to 4 weeks. [place] it in bright, bold letters on a calendar you use often. Tell people about your quit date. Ask for their support. Let your friends and family know how they  can help you quit.  Make a contract  A quit-smoking contract gives you a goal. Write out the contract and sign it. Have it witnessed, if you like. Then keep the contract where you ll see it often, or carry it with you. Read the contract when you re tempted to smoke.  Take action  On the day you quit, reread your quit contract. Think about the benefits you gain by quitting, such as better health and an improved sense of taste, as well as the money you will save from not smoking. Also:    Remove cigarettes from your home, car, or any other place where you stash them.    Throw away all smoking materials, including matches, lighters, and ashtrays.    Review your list of triggers and your plan for coping with them.    Stay away from people or settings you link with smoking.    Make a quit kit that includes gum, mints, carrot sticks, and things to keep your hands and mouth busy.    Talk to your healthcare provider about using quit-smoking products, such as medicine or a nicotine patch, inhaler, nasal spray, gum, or lozenges.  Ask for help  Sometimes you may just need to talk when you miss smoking. Ex-smokers are good to talk to, because they re likely to know how you feel. You may need extra support in the first few weeks after you quit. Ask a friend to call you each day to see how you re doing. Telephone counseling can also help you keep on track. Ask your healthcare provider, local hospital, or public health department to put you in touch with a phone counselor. You may also have to deal with doubters when you decide to quit. Explain to any doubters why you are quitting. Tell them that quitting is important to you. Ask for their support. Tell your smoking buddies that you can walk together instead of smoking together. If someone thinks you won t succeed, say that you have a good quit plan and ask for their support. Let him or her know you re sticking with it.  To learn more  Get more tips from these  resources:    www.cdc.gov/tobacco/quit_smoking/ 800-QUIT-NOW (765-689-9026)    www.smokefree.gov 877-44U-QUIT (863-752-5254)    www.lung.org/stop-smoking/ 800-LUNGUSA (523-975-2437)  Darlene last reviewed this educational content on 5/1/2019 2000-2021 [employer]. All rights reserved. This information is not intended as a substitute for professional medical care. Always follow your healthcare professional's instructions.    Start nicotine patch and other meds to help with smoking cessation.   Increase dose of Celexa. For mood.     Immunization History   Administered Date(s) Administered     COVID-19,PF,Moderna 03/31/2021, 04/28/2021     Influenza Vaccine IM > 6 months Valent IIV4 10/01/2018, 10/01/2019     Influenza Vaccine, 6+MO IM (QUADRIVALENT W/PRESERVATIVES) 09/21/2020     TDAP Vaccine (Boostrix) 03/01/2018        -- Consider Annual Flu / Influenza vaccination - Every Fall (Starting about October 1st)    -- Get the new shingles shot (2 in series) (Shingrix) - from one of the local pharmacies, at your convenience. -- Check cost/coverage.   -- or -- If these are very expensive, go to the Local Public Health Department     Return in approximately 12 week(s), or sooner as needed for follow-up with Dr. Guillen.  - Follow-up Smoking / Depression    Clinic : [phone]  Appointment line: [phone]

## 2021-08-24 ENCOUNTER — OFFICE VISIT (OUTPATIENT)
Dept: INTERNAL MEDICINE | Facility: OTHER | Age: 56
End: 2021-08-24
Attending: STUDENT IN AN ORGANIZED HEALTH CARE EDUCATION/TRAINING PROGRAM
Payer: COMMERCIAL

## 2021-08-24 ENCOUNTER — TELEPHONE (OUTPATIENT)
Dept: INTERNAL MEDICINE | Facility: OTHER | Age: 56
End: 2021-08-24

## 2021-08-24 VITALS
TEMPERATURE: 97.3 F | BODY MASS INDEX: 22.39 KG/M2 | WEIGHT: 136 LBS | RESPIRATION RATE: 16 BRPM | DIASTOLIC BLOOD PRESSURE: 78 MMHG | SYSTOLIC BLOOD PRESSURE: 138 MMHG | OXYGEN SATURATION: 98 % | HEART RATE: 73 BPM

## 2021-08-24 DIAGNOSIS — M70.21 OLECRANON BURSITIS OF RIGHT ELBOW: Primary | ICD-10-CM

## 2021-08-24 PROCEDURE — 99213 OFFICE O/P EST LOW 20 MIN: CPT | Performed by: STUDENT IN AN ORGANIZED HEALTH CARE EDUCATION/TRAINING PROGRAM

## 2021-08-24 ASSESSMENT — ENCOUNTER SYMPTOMS
ACTIVITY CHANGE: 0
CHILLS: 0
FEVER: 0

## 2021-08-24 ASSESSMENT — PAIN SCALES - GENERAL: PAINLEVEL: NO PAIN (0)

## 2021-08-24 NOTE — PROGRESS NOTES
Medication Reconciliation: complete   Advance Care Directive Reviewed    FOOD SECURITY SCREENING QUESTIONS  Hunger Vital Signs:  Within the past 12 months we worried whether our food would run out before we got money to buy more. Never  Within the past 12 months the food we bought just didn't last and we didn't have money to get more. Never  Jazmin العراقي LPN .............8/24/2021  3:52 PM

## 2021-08-24 NOTE — PATIENT INSTRUCTIONS
Please make an appointment for a brace to be made for your elbow with a hand therapist.     The  can help arrange this.     The diagnosis is olecranon bursitis secondary to your fall.

## 2021-08-24 NOTE — TELEPHONE ENCOUNTER
Please correct referral states bursitis of left elbow but  States needs elbow brace for right side.  Jazmin العراقي LPN .............8/24/2021  4:51 PM

## 2021-08-24 NOTE — PROGRESS NOTES
"Ms. Talbot is a 56 year old female who presents to the clinic for elbow swelling    HPI  Fell off a ladder at [employer] on memorial day weekend fell onto right elbow, did not hit head.  Effusion on right elbow resented after several days  Has rheumatoid and psoriatic arthritis.  No history of gout   Takes Embrel once weekly for arthritis.   Elbow Doesn't hurt   Has been using ACE bandage at times which seems to help shrink it but it quickly returns.  No redness or pain in the elbow.  Ideally would like it drained.    Review of Systems   Constitutional: Negative for activity change, chills and fever.   Musculoskeletal:        See HPI        Reviewed and updated as needed this visit by Provider                  EXAM:   Vitals:    08/24/21 1549   BP: 138/78   BP Location: Right arm   Patient Position: Sitting   Cuff Size: Adult Regular   Pulse: 73   Resp: 16   Temp: 97.3  F (36.3  C)   TempSrc: Tympanic   SpO2: 98%   Weight: 61.7 kg (136 lb)         BP Readings from Last 3 Encounters:   08/24/21 138/78   07/20/21 132/78   09/29/20 138/88      Wt Readings from Last 3 Encounters:   08/24/21 61.7 kg (136 lb)   07/20/21 63.3 kg (139 lb 9.6 oz)   09/29/20 61.7 kg (136 lb)      Estimated body mass index is 22.39 kg/m  as calculated from the following:    Height as of 7/20/21: 1.66 m (5' 5.35\").    Weight as of this encounter: 61.7 kg (136 lb).     Physical Exam  Constitutional:       Appearance: Normal appearance. She is normal weight.   HENT:      Head: Normocephalic and atraumatic.   Musculoskeletal:      Comments: 5 cm soft fluid-filled olecranon bursa on the right.  No pain or erythema noted.  No warmth.   Neurological:      Mental Status: She is alert.        Procedures   INVESTIGATIONS:    ASSESSMENT AND PLAN:    ICD-10-CM    1. Olecranon bursitis of left elbow  M70.22        Assessment/Plan: Secondary to traumatic fall on Memorial Day weekend onto her right elbow.  Discussed indications for draining including " evaluating for crystal arthropathy versus infection, neither of which seem evident in her.  She is in agreement to try Ace bandages and follow-up with hand therapy for orthosis creation for her elbow.  Ideally would like compression nearly 24 hours a day for 2 weeks to try and bring down the bursitis.    Electronically signed by:  Quinn Daniel MD on 8/24/2021  Internal Medicine  [facility]

## 2021-08-26 ENCOUNTER — HOSPITAL ENCOUNTER (OUTPATIENT)
Dept: OCCUPATIONAL THERAPY | Facility: OTHER | Age: 56
Setting detail: THERAPIES SERIES
End: 2021-08-26
Attending: STUDENT IN AN ORGANIZED HEALTH CARE EDUCATION/TRAINING PROGRAM
Payer: COMMERCIAL

## 2021-08-26 DIAGNOSIS — M70.21 OLECRANON BURSITIS OF RIGHT ELBOW: ICD-10-CM

## 2021-08-26 PROCEDURE — 97140 MANUAL THERAPY 1/> REGIONS: CPT | Mod: GO

## 2021-08-26 PROCEDURE — 97165 OT EVAL LOW COMPLEX 30 MIN: CPT | Mod: GO

## 2021-08-26 NOTE — PROGRESS NOTES
08/26/21 1500   Quick Adds   Type of Visit Initial Outpatient Occupational Therapy Evaluation   General Information   Start Of Care Date 08/26/21   Referring Physician Dr. Daniel   Orders Evaluate and treat as indicated   Orders Date 08/26/21   Medical Diagnosis Olecranon bursitis of right elbow M70.21    Onset of Illness/Injury or Date of Surgery 05/29/21   Surgical/Medical History Reviewed Yes   Additional Occupational Profile Info/Pertinent History of Current Problem Patient was at work and fell onto her right elbow and hip from a ladder over Memorial Weekend.  Immediately it was black and blue and painful but not swollen.  The pain and bruises went away after a few weeks, but then about 3-4 weeks ago she started noticing swelling right over her elbow where she impacted the ground.  The swelling increases when she doesn't have any compression but the ace banadage seems to keep the swelling from increasing but nothing has made the swelling go away yet.     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    Pain   Patient currently in pain No   Fall Risk Screen   Fall screen completed by OT   Have you fallen 2 or more times in the past year? No   Have you fallen and had an injury in the past year? Yes   Is patient a fall risk? No   Fall screen comments Patient had a mechanical fall off ladder.   Abuse Screen (yes response referral indicated)   Feels Unsafe at Home or Work/School no   Feels Threatened by Someone no   Does Anyone Try to Keep You From Having Contact with Others or Doing Things Outside Your Home? no   Physical Signs of Abuse Present no   Sensation   Sensation Comments  No changes in sensation   Range of Motion (ROM)   ROM Comments Patient has RUE AROM WFL but pain at end range with elbow, forearm, and wrist ROM   Hand Strength   Hand Dominance Right   Left Hand  (pounds) 46 pounds   Right Hand  (pounds) 41 pounds  (slight pain in elbow with full )   Left Lateral Pinch (pounds) 8 pounds   Right Lateral Pinch (pounds) 10 pounds   Left Three Point Pinch (pounds) 14 pounds   Right Three Point Pinch (pounds) 9 pounds  (arthitis thumb pain)   Planned Therapy Interventions   Planned Therapy Interventions ADL training;Stretching;Therapeutic activities;Self care/Home management;Joint mobilization;Manual therapy   Planned Modalities Hot/cold packs;Ultrasound;Electrical stimulation    OT Goal 1   Goal Identifier Pain   Goal Description Patient will report pain no higher than 2/10 with completion of AROM of R elbow during completion of daily activities for 5 consecutive days.   Target Date 09/23/21    OT Goal 2   Goal Identifier Swelling   Goal Description Patient will have decreased swelling in R elbow as determined by decreased elbow crease circumfrential measurements for improved functional use of RUE during completion of work tasks.   Target Date 09/23/21    OT Goal 3   Goal Identifier Functional Use   Goal Description Patient will report no discomfort caused by weightbearing through R elbow during completion of work tasks for 5 consecutive days.   Target Date 09/23/21   Clinical Impression   Criteria for Skilled Therapeutic Interventions Met Yes, treatment indicated   OT Diagnosis R elbow bursitis and swelling impacting daily activities   Influenced by the following impairments Pain with movement, discomort with completion of work tasks   Assessment of Occupational Performance 1-3 Performance Deficits   Clinical Decision Making (Complexity) Low complexity   Therapy Frequency 1-2x/week   Predicted Duration of Therapy Intervention (days/wks) 4 weeks   Risks and Benefits of  Treatment have been explained. Yes   Patient, Family & other staff in agreement with plan of care Yes   Total Evaluation Time   OT Eval, Low Complexity Minutes (95148) 15

## 2021-10-12 ENCOUNTER — OFFICE VISIT (OUTPATIENT)
Dept: INTERNAL MEDICINE | Facility: OTHER | Age: 56
End: 2021-10-12
Attending: INTERNAL MEDICINE
Payer: COMMERCIAL

## 2021-10-12 VITALS
BODY MASS INDEX: 22.78 KG/M2 | OXYGEN SATURATION: 98 % | SYSTOLIC BLOOD PRESSURE: 136 MMHG | HEART RATE: 60 BPM | WEIGHT: 138.4 LBS | TEMPERATURE: 97.5 F | RESPIRATION RATE: 16 BRPM | DIASTOLIC BLOOD PRESSURE: 88 MMHG

## 2021-10-12 DIAGNOSIS — I48.0 PAROXYSMAL ATRIAL FIBRILLATION (H): ICD-10-CM

## 2021-10-12 DIAGNOSIS — K52.831 COLLAGENOUS COLITIS: ICD-10-CM

## 2021-10-12 DIAGNOSIS — Z72.0 TOBACCO ABUSE: ICD-10-CM

## 2021-10-12 DIAGNOSIS — Z23 NEED FOR INFLUENZA VACCINATION: ICD-10-CM

## 2021-10-12 DIAGNOSIS — E78.2 MIXED HYPERLIPIDEMIA: ICD-10-CM

## 2021-10-12 DIAGNOSIS — M06.9 RHEUMATOID ARTHRITIS INVOLVING MULTIPLE SITES, UNSPECIFIED WHETHER RHEUMATOID FACTOR PRESENT (H): ICD-10-CM

## 2021-10-12 DIAGNOSIS — Z79.01 CURRENT USE OF LONG TERM ANTICOAGULATION: ICD-10-CM

## 2021-10-12 DIAGNOSIS — I10 BENIGN ESSENTIAL HYPERTENSION: Primary | ICD-10-CM

## 2021-10-12 DIAGNOSIS — F32.5 MAJOR DEPRESSIVE DISORDER WITH SINGLE EPISODE, IN FULL REMISSION (H): ICD-10-CM

## 2021-10-12 PROBLEM — Z71.6 TOBACCO ABUSE COUNSELING: Status: RESOLVED | Noted: 2019-07-25 | Resolved: 2021-10-12

## 2021-10-12 PROCEDURE — 90471 IMMUNIZATION ADMIN: CPT | Performed by: INTERNAL MEDICINE

## 2021-10-12 PROCEDURE — 90682 RIV4 VACC RECOMBINANT DNA IM: CPT | Performed by: INTERNAL MEDICINE

## 2021-10-12 PROCEDURE — 99214 OFFICE O/P EST MOD 30 MIN: CPT | Mod: 25 | Performed by: INTERNAL MEDICINE

## 2021-10-12 RX ORDER — LEFLUNOMIDE 20 MG/1
20 TABLET ORAL
COMMUNITY
Start: 2021-09-01 | End: 2022-08-16

## 2021-10-12 RX ORDER — CITALOPRAM HYDROBROMIDE 20 MG/1
20 TABLET ORAL DAILY
Qty: 90 TABLET | Refills: 3 | Status: SHIPPED | OUTPATIENT
Start: 2021-10-12 | End: 2022-08-16

## 2021-10-12 ASSESSMENT — PAIN SCALES - GENERAL: PAINLEVEL: NO PAIN (0)

## 2021-10-12 ASSESSMENT — ANXIETY QUESTIONNAIRES
7. FEELING AFRAID AS IF SOMETHING AWFUL MIGHT HAPPEN: NOT AT ALL
GAD7 TOTAL SCORE: 0
5. BEING SO RESTLESS THAT IT IS HARD TO SIT STILL: NOT AT ALL
2. NOT BEING ABLE TO STOP OR CONTROL WORRYING: NOT AT ALL
GAD7 TOTAL SCORE: 0
1. FEELING NERVOUS, ANXIOUS, OR ON EDGE: NOT AT ALL
6. BECOMING EASILY ANNOYED OR IRRITABLE: NOT AT ALL
7. FEELING AFRAID AS IF SOMETHING AWFUL MIGHT HAPPEN: NOT AT ALL
4. TROUBLE RELAXING: NOT AT ALL
GAD7 TOTAL SCORE: 0
8. IF YOU CHECKED OFF ANY PROBLEMS, HOW DIFFICULT HAVE THESE MADE IT FOR YOU TO DO YOUR WORK, TAKE CARE OF THINGS AT HOME, OR GET ALONG WITH OTHER PEOPLE?: NOT DIFFICULT AT ALL
3. WORRYING TOO MUCH ABOUT DIFFERENT THINGS: NOT AT ALL

## 2021-10-12 ASSESSMENT — ENCOUNTER SYMPTOMS
WOUND: 0
COUGH: 0
NAUSEA: 0
LIGHT-HEADEDNESS: 0
ABDOMINAL PAIN: 0
CHILLS: 0
ARTHRALGIAS: 0
VOMITING: 0
AGITATION: 0
PALPITATIONS: 1
CONFUSION: 0
HEMATURIA: 0
FEVER: 0
SHORTNESS OF BREATH: 0
MYALGIAS: 0
DYSURIA: 0
DIZZINESS: 0
BRUISES/BLEEDS EASILY: 1
DIARRHEA: 0
WHEEZING: 0

## 2021-10-12 ASSESSMENT — PATIENT HEALTH QUESTIONNAIRE - PHQ9
10. IF YOU CHECKED OFF ANY PROBLEMS, HOW DIFFICULT HAVE THESE PROBLEMS MADE IT FOR YOU TO DO YOUR WORK, TAKE CARE OF THINGS AT HOME, OR GET ALONG WITH OTHER PEOPLE: NOT DIFFICULT AT ALL
SUM OF ALL RESPONSES TO PHQ QUESTIONS 1-9: 0
SUM OF ALL RESPONSES TO PHQ QUESTIONS 1-9: 0

## 2021-10-12 NOTE — PROGRESS NOTES
Assessment & Plan       ICD-10-CM    1. Benign essential hypertension  I10    2. Need for influenza vaccination  Z23 INFLUENZA QUAD, RECOMBINANT, P-FREE (RIV4) (FLUBLOK)   3. Paroxysmal atrial fibrillation (H)  I48.0    4. Rheumatoid arthritis involving multiple sites, unspecified whether rheumatoid factor present (H)  M06.9    5. Tobacco abuse  Z72.0    6. Mixed hyperlipidemia  E78.2    7. Collagenous colitis  K52.831    8. Major depressive disorder with single episode, in full remission (H)  F32.5 citalopram (CELEXA) 20 MG tablet   9. Current use of long term anticoagulation - Apixaban  Z79.01    Patient presents for follow-up of multiple issues.    Hypertension.  Currently well controlled.  Doing well with current medication regimen.  No changes for now.    Flu shot today.    Atrial fibrillation, continues with apixaban anticoagulation.  Heart rate has been controlled.  Generally is bradycardic.  Does have some easy bruising and bleeding issues.  Overall doing well.  No changes for now.    Rheumatoid arthritis.  Chronic.  Also has collagenous colitis.  Chronically immunocompromised.  Follows with rheumatology.  She is currently using Enbrel.    Tobacco abuse.  Working to quit smoking.  She has nicotine lozenges gum and patches at home.  Some of these were not covered through her insurance.  She hopes to continue to work on cessation on her own.    Mixed hyperlipidemia, cholesterol levels have improved with use of Crestor currently taking 20 mg daily.  No changes for now.    History of major depression, currently in remission.  Dose increase citalopram really helped.  She would like refills today.     Tobacco Cessation:   reports that she has been smoking cigarettes. She has a 19.00 pack-year smoking history. She has never used smokeless tobacco.  Tobacco Cessation Action Plan: Self help information given to patient    Work on weight loss and regular exercise    Return in about 9 months (around 7/21/2022) for  Annual Physical Exam.    Bakari Guillen MD  [facility]    Subjective     Isabel Talbot is a 56 year old female who presents to clinic today for the following health issues:    HPI   Review of Systems   Constitutional: Negative for chills and fever.   HENT: Negative for congestion and hearing loss.         + poor teeth   Eyes: Negative for visual disturbance.   Respiratory: Negative for cough, shortness of breath and wheezing.         + working to quit smoking   Cardiovascular: Positive for palpitations. Negative for chest pain.        + Runs Bradycardic all the time.    Gastrointestinal: Negative for abdominal pain, diarrhea, nausea and vomiting.   Endocrine: Negative for cold intolerance and heat intolerance.   Genitourinary: Negative for dysuria and hematuria.   Musculoskeletal: Negative for arthralgias and myalgias.   Skin: Negative for rash and wound.   Allergic/Immunologic: Positive for immunocompromised state.   Neurological: Negative for dizziness and light-headedness.   Hematological: Bruises/bleeds easily.   Psychiatric/Behavioral: Negative for agitation and confusion.        Anxiety and depression are doing much better with citalopram          Objective    /88 (BP Location: Right arm, Patient Position: Sitting, Cuff Size: Adult Regular)   Pulse 60   Temp 97.5  F (36.4  C) (Tympanic)   Resp 16   Wt 62.8 kg (138 lb 6.4 oz)   SpO2 98%   BMI 22.78 kg/m    Body mass index is 22.78 kg/m .  Physical Exam  Constitutional:       General: She is not in acute distress.     Appearance: She is well-developed. She is not diaphoretic.   HENT:      Head: Normocephalic and atraumatic.   Eyes:      General: No scleral icterus.     Conjunctiva/sclera: Conjunctivae normal.   Neck:      Vascular: No carotid bruit.   Cardiovascular:      Rate and Rhythm: Normal rate and regular rhythm.      Pulses: Normal pulses.   Pulmonary:      Effort: Pulmonary effort is normal.      Breath sounds:  Wheezing present. No rhonchi.   Abdominal:      Palpations: Abdomen is soft.      Tenderness: There is no abdominal tenderness.   Musculoskeletal:         General: No deformity.      Cervical back: Neck supple.      Right lower leg: No edema.      Left lower leg: No edema.   Lymphadenopathy:      Cervical: No cervical adenopathy.   Skin:     General: Skin is warm and dry.      Coloration: Skin is not jaundiced.      Findings: No rash.   Neurological:      Mental Status: She is alert and oriented to person, place, and time. Mental status is at baseline.   Psychiatric:         Mood and Affect: Mood normal.         Behavior: Behavior normal.            Answers for HPI/ROS submitted by the patient on 10/12/2021  If you checked off any problems, how difficult have these problems made it for you to do your work, take care of things at home, or get along with other people?: Not difficult at all  PHQ9 TOTAL SCORE: 0  NELLY 7 TOTAL SCORE: 0

## 2021-10-12 NOTE — PATIENT INSTRUCTIONS
QUIT SMOKING!!     -- Choose a quit date (within 1 month). Quitting smoking abruptly is more successful than gradually cutting back.   -- Tell everyone about it (friends, family, coworkers)   -- Think about when you smoke the most, and what you'll do during those times (eg when in the car, work breaks, etc)     Consider:   -- Start varenicline (Chantix) 1 week before your quit date   -- Start bupropion (Wellbutrin/Zyban) 1 week before your quit date -- Welbutrin 1 pill daily for 1 week then 1 pill twice daily      -- Stop smoking on quit date   -- Starting with quit date, use nicotine lozenges/gum as needed for cravings     -- Quit Plan - Call them in the next 1-2 weeks to help you quit smoking.                        [phone]-PLAN [phone])                        http://www.quitWattblock.C-Note   -- http://smokefree.gov/       Davis the Celexa is working!    Return in approximately -- End of July 2022, or sooner as needed for follow-up with Dr. Guillen.  - Annual Follow-up / Physical    Clinic : [phone]  Appointment line: [phone]

## 2021-10-12 NOTE — NURSING NOTE
"Chief Complaint   Patient presents with     Follow Up     depression and smoking       FOOD SECURITY SCREENING QUESTIONS  Hunger Vital Signs:  Within the past 12 months we worried whether our food would run out before we got money to buy more. Never  Within the past 12 months the food we bought just didn't last and we didn't have money to get more. Never  Samina Peralta LPN 10/12/2021 10:48 AM      Initial /88 (BP Location: Right arm, Patient Position: Sitting, Cuff Size: Adult Regular)   Pulse 60   Temp 97.5  F (36.4  C) (Tympanic)   Resp 16   Wt 62.8 kg (138 lb 6.4 oz)   SpO2 98%   BMI 22.78 kg/m   Estimated body mass index is 22.78 kg/m  as calculated from the following:    Height as of 7/20/21: 1.66 m (5' 5.35\").    Weight as of this encounter: 62.8 kg (138 lb 6.4 oz).  Medication Reconciliation: complete    Samina Peralta LPN  "

## 2021-10-13 ASSESSMENT — ANXIETY QUESTIONNAIRES: GAD7 TOTAL SCORE: 0

## 2021-10-13 ASSESSMENT — PATIENT HEALTH QUESTIONNAIRE - PHQ9: SUM OF ALL RESPONSES TO PHQ QUESTIONS 1-9: 0

## 2021-10-15 DIAGNOSIS — N89.8 VAGINAL DRYNESS: ICD-10-CM

## 2021-10-18 RX ORDER — CONJUGATED ESTROGENS 0.62 MG/G
CREAM VAGINAL
Qty: 30 G | Refills: 0 | OUTPATIENT
Start: 2021-10-18

## 2021-10-18 NOTE — TELEPHONE ENCOUNTER
Routing refill request to provider for review/approval because:  Drug not active on patient's medication list    LOV: 10/12/2021  Domitila Mcguire RN on 10/18/2021 at 3:35 PM

## 2021-10-19 NOTE — TELEPHONE ENCOUNTER
Patient needs appointment with family medicine if the estrogen needs to be refilled.  Uncertain about date of her last annual female exam    Routing comment      Above per Dr. Guillen   Called patient and informed of Dr. Guillen response. patient states that she was off of it for awhile but would like to go back on it.  Patient transferred to scheduling.  Domitila Mcguire RN on 10/19/2021 at 8:17 AM

## 2021-10-26 ENCOUNTER — OFFICE VISIT (OUTPATIENT)
Dept: FAMILY MEDICINE | Facility: OTHER | Age: 56
End: 2021-10-26
Attending: PHYSICIAN ASSISTANT
Payer: COMMERCIAL

## 2021-10-26 VITALS
WEIGHT: 137 LBS | HEART RATE: 60 BPM | TEMPERATURE: 96.9 F | BODY MASS INDEX: 22.55 KG/M2 | SYSTOLIC BLOOD PRESSURE: 148 MMHG | OXYGEN SATURATION: 97 % | DIASTOLIC BLOOD PRESSURE: 82 MMHG | RESPIRATION RATE: 16 BRPM

## 2021-10-26 DIAGNOSIS — Z12.31 ENCOUNTER FOR SCREENING MAMMOGRAM FOR BREAST CANCER: ICD-10-CM

## 2021-10-26 DIAGNOSIS — N89.8 VAGINAL DRYNESS: Primary | ICD-10-CM

## 2021-10-26 PROBLEM — K52.9 CHRONIC DIARRHEA: Status: ACTIVE | Noted: 2019-08-14

## 2021-10-26 PROCEDURE — 99213 OFFICE O/P EST LOW 20 MIN: CPT | Performed by: PHYSICIAN ASSISTANT

## 2021-10-26 ASSESSMENT — ANXIETY QUESTIONNAIRES
4. TROUBLE RELAXING: NOT AT ALL
8. IF YOU CHECKED OFF ANY PROBLEMS, HOW DIFFICULT HAVE THESE MADE IT FOR YOU TO DO YOUR WORK, TAKE CARE OF THINGS AT HOME, OR GET ALONG WITH OTHER PEOPLE?: NOT DIFFICULT AT ALL
6. BECOMING EASILY ANNOYED OR IRRITABLE: NOT AT ALL
GAD7 TOTAL SCORE: 0
5. BEING SO RESTLESS THAT IT IS HARD TO SIT STILL: NOT AT ALL
1. FEELING NERVOUS, ANXIOUS, OR ON EDGE: NOT AT ALL
7. FEELING AFRAID AS IF SOMETHING AWFUL MIGHT HAPPEN: NOT AT ALL
7. FEELING AFRAID AS IF SOMETHING AWFUL MIGHT HAPPEN: NOT AT ALL
GAD7 TOTAL SCORE: 0
2. NOT BEING ABLE TO STOP OR CONTROL WORRYING: NOT AT ALL
3. WORRYING TOO MUCH ABOUT DIFFERENT THINGS: NOT AT ALL
GAD7 TOTAL SCORE: 0

## 2021-10-26 ASSESSMENT — PATIENT HEALTH QUESTIONNAIRE - PHQ9
SUM OF ALL RESPONSES TO PHQ QUESTIONS 1-9: 0
10. IF YOU CHECKED OFF ANY PROBLEMS, HOW DIFFICULT HAVE THESE PROBLEMS MADE IT FOR YOU TO DO YOUR WORK, TAKE CARE OF THINGS AT HOME, OR GET ALONG WITH OTHER PEOPLE: NOT DIFFICULT AT ALL
SUM OF ALL RESPONSES TO PHQ QUESTIONS 1-9: 0

## 2021-10-26 ASSESSMENT — PAIN SCALES - GENERAL: PAINLEVEL: NO PAIN (0)

## 2021-10-26 NOTE — NURSING NOTE
"Chief Complaint   Patient presents with     Refill Request   Requesting a refill on premarin cream. Patient has not used premarin cream in two or three years. Patient is having \"sexual issues.\"    Initial BP (!) 152/84   Pulse 60   Temp 96.9  F (36.1  C) (Tympanic)   Resp 16   Wt 62.1 kg (137 lb)   SpO2 97%   Breastfeeding No   BMI 22.55 kg/m   Estimated body mass index is 22.55 kg/m  as calculated from the following:    Height as of 7/20/21: 1.66 m (5' 5.35\").    Weight as of this encounter: 62.1 kg (137 lb).  Medication Reconciliation: complete    FOOD SECURITY SCREENING QUESTIONS  Hunger Vital Signs:  Within the past 12 months we worried whether our food would run out before we got money to buy more. Never  Within the past 12 months the food we bought just didn't last and we didn't have money to get more. Never      Advanced Care Directive Reviewed    Bright Barraza LPN    "

## 2021-10-26 NOTE — PROGRESS NOTES
"Nursing Notes:   Bright Barraza LPN  10/26/2021  8:44 AM  Signed  Chief Complaint   Patient presents with     Refill Request   Requesting a refill on premarin cream. Patient has not used premarin cream in two or three years. Patient is having \"sexual issues.\"    Initial BP (!) 152/84   Pulse 60   Temp 96.9  F (36.1  C) (Tympanic)   Resp 16   Wt 62.1 kg (137 lb)   SpO2 97%   Breastfeeding No   BMI 22.55 kg/m   Estimated body mass index is 22.55 kg/m  as calculated from the following:    Height as of 7/20/21: 1.66 m (5' 5.35\").    Weight as of this encounter: 62.1 kg (137 lb).  Medication Reconciliation: complete    FOOD SECURITY SCREENING QUESTIONS  Hunger Vital Signs:  Within the past 12 months we worried whether our food would run out before we got money to buy more. Never  Within the past 12 months the food we bought just didn't last and we didn't have money to get more. Never      Advanced Care Directive Reviewed    Bright Barraza LPN        HPI:    Isabel Talbot is a 56 year old female who presents for refill. Patient has previous history of vaginal dryness. Was previously on Premarin cream. Has not used the cream over the last 2 to 3 years. Wondering about getting a refill. Currently in relationship and having difficulty with intercourse. Wondering if the cream will help alleviate her vaginal dryness symptoms. No STD concerns. Declines testing today. No vaginal itching or discharge. No dysuria, frequency, or urgency.    Patient is overdue for a mammogram for breast cancer screening. Asymptomatic.    Past Medical History:   Diagnosis Date     Benign essential hypertension 3/13/2018     Encounter for tobacco use cessation counseling 3/13/2018     Hypovitaminosis D 3/1/2018     Paroxysmal atrial fibrillation (H) 3/1/2018     Rheumatoid arthritis, involving unspecified site, unspecified rheumatoid factor presence 3/1/2018       Past Surgical History:   Procedure Laterality Date     COLONOSCOPY N/A " 4/2/2018    F/U 2028     COLONOSCOPY  09/04/2019    ESSENTIA follow up 10 years, normal, 9/4/29     HAND SURGERY      s/p fall - age 19 or 20       Family History   Problem Relation Age of Onset     Cancer Mother         throat, tonsillar cancer     Heart Disease Father      Hypertension Father      Hyperlipidemia Father        Social History     Tobacco Use     Smoking status: Current Every Day Smoker     Packs/day: 0.50     Years: 38.00     Pack years: 19.00     Types: Cigarettes     Smokeless tobacco: Never Used   Substance Use Topics     Alcohol use: Yes     Comment: occasional.-wine/hard liquor 2 times week       Current Outpatient Medications   Medication Sig Dispense Refill     amLODIPine (NORVASC) 10 MG tablet Take 1 tablet (10 mg) by mouth daily 90 tablet 3     apixaban ANTICOAGULANT (ELIQUIS ANTICOAGULANT) 5 MG tablet Take 1 tablet (5 mg) by mouth 2 times daily 180 tablet 3     citalopram (CELEXA) 20 MG tablet Take 1 tablet (20 mg) by mouth daily 90 tablet 3     conjugated estrogens (PREMARIN) 0.625 MG/GM vaginal cream Place 0.5 g vaginally daily for 14 days, THEN 0.5 g three times a week. 30 g 3     etanercept (ENBREL) 50 MG/ML injection Inject 0.98 mLs (50 mg) Subcutaneous once a week       leflunomide (ARAVA) 20 MG tablet Take 20 mg by mouth       nicotine (COMMIT) 2 MG lozenge Place 1 lozenge (2 mg) inside cheek every hour as needed for smoking cessation 168 lozenge 11     nicotine (NICODERM CQ) 14 MG/24HR 24 hr patch Place 1 patch onto the skin every 24 hours 28 patch 6     nicotine (NICORETTE) 2 MG gum Place 1 each (2 mg) inside cheek every hour as needed for smoking cessation 240 each 11     nicotine (NICOTROL) 10 MG inhaler Use 1 cartridge as needed for urge to smoke by puffing over course of 20min.  Use 6-16 cart/day; reduce number of cart/day over 6-12 weeks. 168 each 11     propafenone (RYTHMOL) 150 MG TABS tablet Take 1 tablet (150 mg) by mouth 3 times daily - for heart 270 tablet 3      rosuvastatin (CRESTOR) 20 MG tablet Take 1 tablet (20 mg) by mouth daily 90 tablet 3       Allergies   Allergen Reactions     Losartan Angioedema and Rash     Possible angioedema on 10/21/19 - patient to follow up with dermatology for confirmation  Previously entered as rash to hands and feet noted 7/17/18 (this allergy was deleted on 8/8/18)     Ace Inhibitors Cough     Cough     Metoprolol Other (See Comments)     Low heart rate       REVIEW OF SYSTEMS:  Refer to HPI.    EXAM:   Vitals:    BP (!) 148/82   Pulse 60   Temp 96.9  F (36.1  C) (Tympanic)   Resp 16   Wt 62.1 kg (137 lb)   SpO2 97%   Breastfeeding No   BMI 22.55 kg/m      General Appearance: Pleasant, alert, appropriate appearance for age. No acute distress  Chest/Respiratory Exam: Normal chest wall and respirations. Clear to auscultation.  Breast Exam: Declines exam.  Cardiovascular Exam: Regular rate and rhythm. S1, S2, no murmur, click, gallop, or rubs.  Skin: no rash or abnormalities  Psychiatric Exam: Alert and oriented - appropriate affect.    PHQ Depression Screen  PHQ-9 SCORE 7/20/2021 10/12/2021 10/26/2021   PHQ-9 Total Score MyChart - 0 0   PHQ-9 Total Score 13 0 0         Answers for HPI/ROS submitted by the patient on 10/26/2021  If you checked off any problems, how difficult have these problems made it for you to do your work, take care of things at home, or get along with other people?: Not difficult at all  PHQ9 TOTAL SCORE: 0  NELLY 7 TOTAL SCORE: 0      ASSESSMENT AND PLAN:      ICD-10-CM    1. Vaginal dryness  N89.8 conjugated estrogens (PREMARIN) 0.625 MG/GM vaginal cream   2. Encounter for screening mammogram for breast cancer  Z12.31 MA Screen Bilateral w/Won         Refill Premarin cream. Up-to-date on Pap test. No acute concerns at this time. Can refer to OB/GYN for consult if symptoms are persistent or worsening.    Ordered mammogram for breast cancer screening.    Leela Carrion PA-C ..................10/26/2021 8:49 AM

## 2021-10-27 ASSESSMENT — PATIENT HEALTH QUESTIONNAIRE - PHQ9: SUM OF ALL RESPONSES TO PHQ QUESTIONS 1-9: 0

## 2021-10-27 ASSESSMENT — ANXIETY QUESTIONNAIRES: GAD7 TOTAL SCORE: 0

## 2021-11-12 ENCOUNTER — IMMUNIZATION (OUTPATIENT)
Dept: FAMILY MEDICINE | Facility: OTHER | Age: 56
End: 2021-11-12
Attending: FAMILY MEDICINE
Payer: COMMERCIAL

## 2021-11-12 PROCEDURE — 91300 PR COVID VAC PFIZER DIL RECON 30 MCG/0.3 ML IM: CPT

## 2021-11-12 PROCEDURE — 0004A PR COVID VAC PFIZER DIL RECON 30 MCG/0.3 ML IM: CPT

## 2021-11-19 ENCOUNTER — HOSPITAL ENCOUNTER (OUTPATIENT)
Dept: MAMMOGRAPHY | Facility: OTHER | Age: 56
Discharge: HOME | End: 2021-11-19
Attending: PHYSICIAN ASSISTANT | Admitting: PHYSICIAN ASSISTANT
Payer: COMMERCIAL

## 2021-11-19 DIAGNOSIS — Z12.31 ENCOUNTER FOR SCREENING MAMMOGRAM FOR BREAST CANCER: ICD-10-CM

## 2021-11-19 PROCEDURE — 77063 BREAST TOMOSYNTHESIS BI: CPT

## 2022-02-17 PROBLEM — M06.9 RHEUMATOID ARTHRITIS (H): Status: ACTIVE | Noted: 2018-03-01

## 2022-03-24 ENCOUNTER — OFFICE VISIT (OUTPATIENT)
Dept: FAMILY MEDICINE | Facility: OTHER | Age: 57
End: 2022-03-24
Attending: PHYSICIAN ASSISTANT
Payer: COMMERCIAL

## 2022-03-24 VITALS
BODY MASS INDEX: 21.27 KG/M2 | TEMPERATURE: 96.8 F | OXYGEN SATURATION: 98 % | WEIGHT: 129.2 LBS | RESPIRATION RATE: 18 BRPM | SYSTOLIC BLOOD PRESSURE: 134 MMHG | HEART RATE: 70 BPM | DIASTOLIC BLOOD PRESSURE: 86 MMHG

## 2022-03-24 DIAGNOSIS — K52.831 COLLAGENOUS COLITIS: ICD-10-CM

## 2022-03-24 PROCEDURE — 99213 OFFICE O/P EST LOW 20 MIN: CPT | Performed by: PHYSICIAN ASSISTANT

## 2022-03-24 RX ORDER — BUDESONIDE 3 MG/1
CAPSULE, COATED PELLETS ORAL
Qty: 90 CAPSULE | Refills: 1 | Status: SHIPPED | OUTPATIENT
Start: 2022-03-24 | End: 2022-04-27

## 2022-03-24 RX ORDER — MEDROXYPROGESTERONE ACETATE 150 MG/ML
INJECTION, SUSPENSION INTRAMUSCULAR
COMMUNITY
Start: 2022-03-03

## 2022-03-24 ASSESSMENT — PATIENT HEALTH QUESTIONNAIRE - PHQ9
SUM OF ALL RESPONSES TO PHQ QUESTIONS 1-9: 6
SUM OF ALL RESPONSES TO PHQ QUESTIONS 1-9: 6
10. IF YOU CHECKED OFF ANY PROBLEMS, HOW DIFFICULT HAVE THESE PROBLEMS MADE IT FOR YOU TO DO YOUR WORK, TAKE CARE OF THINGS AT HOME, OR GET ALONG WITH OTHER PEOPLE: NOT DIFFICULT AT ALL

## 2022-03-24 ASSESSMENT — ENCOUNTER SYMPTOMS
NAUSEA: 1
WOUND: 0
HEADACHES: 0
HEARTBURN: 0
DIARRHEA: 1
ABDOMINAL DISTENTION: 0
HEMATOCHEZIA: 0
ABDOMINAL PAIN: 1
LIGHT-HEADEDNESS: 0
DYSURIA: 0
FATIGUE: 0
DIZZINESS: 0
FREQUENCY: 0
FLANK PAIN: 0
VOMITING: 0
FEVER: 0
CONSTIPATION: 0
ANAL BLEEDING: 0
CHILLS: 0

## 2022-03-24 ASSESSMENT — PAIN SCALES - GENERAL: PAINLEVEL: MILD PAIN (2)

## 2022-03-24 NOTE — NURSING NOTE
Pt presents to clinic today for a referral to gastroenterology.       FOOD SECURITY SCREENING QUESTIONS:    The next two questions are to help us understand your food security.  If you are feeling you need any assistance in this area, we have resources available to support you today.    Hunger Vital Signs:  Within the past 12 months we worried whether our food would run out before we got money to buy more. Never  Within the past 12 months the food we bought just didn't last and we didn't have money to get more. Never          Medication Reconciliation: complete  Ingrid Baker LPN,LPN on 3/24/2022 at 2:29 PM

## 2022-03-24 NOTE — PATIENT INSTRUCTIONS
Started on budesonide. Take 9 mg daily for 6-8 weeks.   If diarrhea has decreased (<3 stools daily and no watery stools), can then decrease to 6 mg daily for 2 weeks, then 3 mg daily for 2 weeks, then stop.     Recheck in 1-2 weeks if symptoms are not improving or worsening.  Monitor for blood in the stool, increased abdominal pain, lightheadedness, dizziness, worsening concerns.    Take a probiotic pill daily.   Try peptobismol as needed.     Try small amounts of food and drink frequently. Offer a bland diet. Advance as tolerated. Avoid dairy products the first day. You may add yogurt tomorrow as the first dairy product.    Try the BRAT diet (bread, bananas, rice, applesauce, tea, toast).  This is a very bland diet which should not irritate your colon.  Hold off on spicy foods, red sauces, mexican or chinese food.    Call or return to clinic as needed if your symptoms worsen or fail to improve as anticipated.     If the pain does not begin improving, localizes to the right lower belly, there is increased fever, or other progression of symptoms, return for reassessment.    Should I see a doctor or nurse about my stomach ache? -- Most people do not need to see a doctor or nurse for a stomach ache. But you should see your doctor or nurse if:  ?You have bloody bowel movements, diarrhea, or vomiting  ?Your pain is severe and lasts more than an hour or comes and goes for more than 24 hours  ?You cannot eat or drink for hours  ?You have a fever higher than 102 F (39 C)  ?You lose a lot of weight without trying to, or lose interest in food         Patient Education     Understanding Colitis   Colitis is when a part of your colon becomes inflamed or swollen. The colon is also called the large intestine. It helps with digestion and waste removal.    What causes colitis?  Colitis can be caused by many things. The most common causes are:    Viral or bacterial infections    Inflammatory bowel disease (ulcerative colitis or  Crohn s disease)    Certain medicines, such as antibiotics    Radiation therapy to the colon  Symptoms of colitis  The symptoms of colitis may last a short time. Or they can be chronic. The most common symptoms are:     Diarrhea, sometimes bloody    Stomach pain or cramping    Fever    Weight loss in severe cases   Diagnosing colitis  Your healthcare provider will take a full health history and family history. He or she will also give you a physical exam. Depending on the results of your history and physical exam, your provider may also order certain tests to help find out the cause of your colitis. These may include:     Lab tests. Your blood and stool will be checked.    Endoscopy and biopsy.  Endoscopy uses a long, flexible tube with a tiny light and camera on one end to check the inside of your large intestine. When only the colon is checked, this is called a colonoscopy. During an endoscopy, your provider may take a small sample of your tissue to look at under a microscope. This is called a biopsy.    Imaging tests. These include X-ray, CT scan, MRI, and capsule endoscopy.  Treatment for colitis  Treatment for colitis depends on what is causing it and how serious your symptoms are. In some cases, you may not need treatment. For example, colitis from an infection may go away without care.    Treatment may include:     Medicines. You may take these by mouth (oral) or  as a rectal suppository or enema. Some medicines are given by injection. They can lessen swelling and ease symptoms.    Changes in your diet. Some foods can make symptoms worse. Common triggers are milk, coffee, alcohol, and fried foods.    Surgery. In some cases, you may need surgery to remove a damaged part of the colon.  Call 911  Call 911 if any of these occur:     Trouble breathing    Confusion    Very drowsy or trouble awakening    Fainting or loss of consciousness    Rapid heart rate    Chest pain  When to call your healthcare provider   Call  your healthcare provider right away if you have any of these:    Symptoms that don t get better, or get worse    Fever of 100.4 F (38 C) or higher, or as directed by your healthcare provider    Pain that gets worse    Bloody diarrhea    Bleeding from your rectum    New symptoms      Darlene last reviewed this educational content on 4/1/2020 2000-2021 [employer]. All rights reserved. This information is not intended as a substitute for professional medical care. Always follow your healthcare professional's instructions.

## 2022-03-24 NOTE — PROGRESS NOTES
Assessment & Plan   Problem List Items Addressed This Visit        Digestive    Collagenous colitis    Relevant Medications    budesonide (ENTOCORT EC) 3 MG EC capsule         -Started on budesonide. Take 9 mg daily for 6-8 weeks.   If diarrhea has decreased (<3 stools daily and no watery stools), can then decrease to 6 mg daily for 2 weeks, then 3 mg daily for 2 weeks, then stop.     Recheck in 1-2 weeks if symptoms are not improving or worsening.  Monitor for blood in the stool, increased abdominal pain, lightheadedness, dizziness, worsening concerns.    Take a probiotic pill daily.   Try peptobismol as needed.     Try small amounts of food and drink frequently. Offer a bland diet. Advance as tolerated. Avoid dairy products the first day. You may add yogurt tomorrow as the first dairy product.    Try the BRAT diet (bread, bananas, rice, applesauce, tea, toast).  This is a very bland diet which should not irritate your colon.  Hold off on spicy foods, red sauces, mexican or chinese food.    Call or return to clinic as needed if your symptoms worsen or fail to improve as anticipated.     If the pain does not begin improving, localizes to the right lower belly, there is increased fever, or other progression of symptoms, return for reassessment.  Gave warning signs and symptoms.     See Patient Instructions    Return if symptoms worsen or fail to improve.    Leela Carrion PA-C on 3/24/2022 at 3:16 PM      I was present with the student who participated in the service and in the documentation of this note.  I have verified the history and personally performed a physical exam and medical decision making, and have verified the content of the note, which accurately reflects my assessment of the patient and the plan of care.  Leela Carrion PA-C  [facility] [place]   Isabel is a 56 year old who presents for concerns for a flare in her colitis. She states it has been worse over the past 2 weeks.  She states she has to have a bowel movement approximately 20 times a day including during the night and needs to wear depends due to the frequency. She states it is loose diarrhea in small amounts. She has been taking metamucil which helps somewhat. She has taken imodium 3 days ago which made her stomach upset worse. She states that she has been drinking enough fluid and electrolyte replacement to stay hydrated. She denies any recent triggers precipitating her flare up. She occasionally gets nauseated right before needing to have a bowel movement. She states her last flare up of colitis was 6 months ago, but states this flare up is much worse.       History of Present Illness       Mental Health Follow-up:                    Today's PHQ-9         PHQ-9 Total Score: 6  PHQ-9 Q9 Thoughts of better off dead/self-harm past 2 weeks :   Not at all    How difficult have these problems made it for you to do your work, take care of things at home, or get along with other people: Not difficult at all        Reason for visit:  Colotis  Symptom onset:  1-2 weeks ago    She eats 0-1 servings of fruits and vegetables daily.She consumes 0 sweetened beverage(s) daily.She exercises with enough effort to increase her heart rate 60 or more minutes per day.  She exercises with enough effort to increase her heart rate 3 or less days per week.   She is taking medications regularly.           Review of Systems   Constitutional: Negative for chills, fatigue and fever.   HENT: Negative for ear pain and sore throat.    Gastrointestinal: Positive for abdominal pain, diarrhea and nausea. Negative for abdominal distention, anal bleeding, constipation, heartburn, hematochezia and vomiting.   Genitourinary: Negative for dysuria, flank pain, frequency and pelvic pain.   Musculoskeletal: Negative.    Skin: Negative for rash and wound.   Neurological: Negative for dizziness, light-headedness and headaches.   Psychiatric/Behavioral: Negative.              Objective    /86 (BP Location: Right arm, Patient Position: Sitting, Cuff Size: Adult Regular)   Pulse 70   Temp 96.8  F (36  C) (Tympanic)   Resp 18   Wt 58.6 kg (129 lb 3.2 oz)   LMP  (LMP Unknown)   SpO2 98%   Breastfeeding No   BMI 21.27 kg/m    Body mass index is 21.27 kg/m .  Physical Exam  Vitals and nursing note reviewed.   Constitutional:       General: She is not in acute distress.     Appearance: Normal appearance. She is not toxic-appearing.   HENT:      Head: Normocephalic and atraumatic.      Mouth/Throat:      Mouth: Mucous membranes are moist.      Pharynx: Oropharynx is clear.   Cardiovascular:      Rate and Rhythm: Normal rate and regular rhythm.      Heart sounds: Normal heart sounds.   Pulmonary:      Effort: Pulmonary effort is normal. No respiratory distress.      Breath sounds: Normal breath sounds.   Abdominal:      General: Abdomen is flat. Bowel sounds are normal. There is no distension.      Palpations: There is no mass.      Tenderness: There is abdominal tenderness. There is no right CVA tenderness, left CVA tenderness, guarding or rebound.      Hernia: No hernia is present.      Comments: Mild suprapubic tenderness to palpation   Musculoskeletal:         General: Normal range of motion.   Skin:     General: Skin is warm and dry.      Capillary Refill: Capillary refill takes less than 2 seconds.      Coloration: Skin is not jaundiced.      Findings: No rash.   Neurological:      General: No focal deficit present.      Mental Status: She is alert and oriented to person, place, and time.   Psychiatric:         Mood and Affect: Mood normal.         Behavior: Behavior normal.         Thought Content: Thought content normal.         Judgment: Judgment normal.

## 2022-03-25 ASSESSMENT — ENCOUNTER SYMPTOMS
PSYCHIATRIC NEGATIVE: 1
SORE THROAT: 0
MUSCULOSKELETAL NEGATIVE: 1

## 2022-04-04 ENCOUNTER — TELEPHONE (OUTPATIENT)
Dept: FAMILY MEDICINE | Facility: OTHER | Age: 57
End: 2022-04-04
Payer: COMMERCIAL

## 2022-04-04 DIAGNOSIS — K52.831 COLLAGENOUS COLITIS: Primary | ICD-10-CM

## 2022-04-04 NOTE — TELEPHONE ENCOUNTER
Patient states she was seen recently for GI issues and at the time she did not want a referral as she was given meds, the medication is not working and she would now like the a referral for gastro [place] but would travel to [place] if has to,     Catarina Salcedo on 4/4/2022 at 12:37 PM

## 2022-04-08 ENCOUNTER — TELEPHONE (OUTPATIENT)
Dept: FAMILY MEDICINE | Facility: OTHER | Age: 57
End: 2022-04-08
Payer: COMMERCIAL

## 2022-04-08 NOTE — TELEPHONE ENCOUNTER
Called patient. She states that she has been having white creamy substance mixed in with her stools. She also has had some mucousy stools. This started this morning. She has had some abdominal cramping with her bowel movements. She denies seeing any blood in her stools. She had a fever two days ago but that has resolved. She denies chills and n/v. Informed her that I would route this to her PCP for further review and recommendations. Also advised her that if she doesn't hear from anyone by Monday afternoon about her gastro referral to call back. Bryson Zafar RN, BSN  ....................  4/8/2022   9:20 AM

## 2022-04-08 NOTE — TELEPHONE ENCOUNTER
Patient was referred to [facility] general surgery.  Please have the patient set up an appointment with a general surgeon at [facility].  Leela Carrion PA-C.......... 4/8/2022 12:52 PM

## 2022-04-08 NOTE — TELEPHONE ENCOUNTER
Called patient, she wants to follow up about referral before scheduling with a surgeon at [facility]. Gave her the number to [facility], she will call back Monday morning and let us know if she needs the referral faxed elsewhere.    Aga Olmstead on 4/8/2022 at 4:38 PM

## 2022-04-11 NOTE — TELEPHONE ENCOUNTER
Called patient back and transferred her to scheduling for the referral that was put in.  ...Ingrid Baker LPN on 4/11/2022 at 9:58 AM

## 2022-04-13 ENCOUNTER — TELEPHONE (OUTPATIENT)
Dept: FAMILY MEDICINE | Facility: OTHER | Age: 57
End: 2022-04-13
Payer: COMMERCIAL

## 2022-04-13 DIAGNOSIS — R19.7 DIARRHEA, UNSPECIFIED TYPE: Primary | ICD-10-CM

## 2022-04-13 NOTE — TELEPHONE ENCOUNTER
Patient is looking to get an order for a stool sample. She would like to  today.     Catarina Salcedo on 4/13/2022 at 8:46 AM

## 2022-04-13 NOTE — TELEPHONE ENCOUNTER
Called patient and verified name and , informed her that stool kit is at diagnostic window here.   ....Ingrid Baker LPN on 2022 at 11:09 AM

## 2022-04-13 NOTE — TELEPHONE ENCOUNTER
Ordered stool studies.  Please leave a stool collection kit at the diagnostics window for the patient to .  Leela Carrion PA-C.......... 4/13/2022 10:55 AM

## 2022-04-14 ENCOUNTER — LAB (OUTPATIENT)
Dept: LAB | Facility: OTHER | Age: 57
End: 2022-04-14
Attending: PHYSICIAN ASSISTANT
Payer: COMMERCIAL

## 2022-04-14 DIAGNOSIS — R19.7 DIARRHEA, UNSPECIFIED TYPE: ICD-10-CM

## 2022-04-14 LAB
C DIFF TOX B STL QL: POSITIVE
H PYLORI AG STL QL IA: NEGATIVE
RIBOTYPE 027/NAP1/BI: ABNORMAL

## 2022-04-14 PROCEDURE — 87493 C DIFF AMPLIFIED PROBE: CPT | Mod: ZL,XU

## 2022-04-14 PROCEDURE — 87338 HPYLORI STOOL AG IA: CPT | Mod: ZL

## 2022-04-14 PROCEDURE — 87506 IADNA-DNA/RNA PROBE TQ 6-11: CPT | Mod: ZL

## 2022-04-14 PROCEDURE — 87209 SMEAR COMPLEX STAIN: CPT | Mod: ZL

## 2022-04-15 ENCOUNTER — TELEPHONE (OUTPATIENT)
Dept: FAMILY MEDICINE | Facility: OTHER | Age: 57
End: 2022-04-15
Payer: COMMERCIAL

## 2022-04-15 DIAGNOSIS — A04.72 C. DIFFICILE COLITIS: Primary | ICD-10-CM

## 2022-04-15 LAB
C COLI+JEJUNI+LARI FUSA STL QL NAA+PROBE: NOT DETECTED
EC STX1 GENE STL QL NAA+PROBE: NOT DETECTED
EC STX2 GENE STL QL NAA+PROBE: NOT DETECTED
NOROV GI+II ORF1-ORF2 JNC STL QL NAA+PR: NOT DETECTED
O+P STL MICRO: NEGATIVE
RVA NSP5 STL QL NAA+PROBE: NOT DETECTED
SALMONELLA SP RPOD STL QL NAA+PROBE: NOT DETECTED
SHIGELLA SP+EIEC IPAH STL QL NAA+PROBE: NOT DETECTED
TRI STN SPEC: NORMAL
TRI STN SPEC: NORMAL
V CHOL+PARA RFBL+TRKH+TNAA STL QL NAA+PR: NOT DETECTED
Y ENTERO RECN STL QL NAA+PROBE: NOT DETECTED

## 2022-04-15 RX ORDER — VANCOMYCIN HYDROCHLORIDE 125 MG/1
125 CAPSULE ORAL 4 TIMES DAILY
Qty: 40 CAPSULE | Refills: 0 | Status: SHIPPED | OUTPATIENT
Start: 2022-04-15 | End: 2022-04-25

## 2022-04-15 NOTE — TELEPHONE ENCOUNTER
Please call  Your C. difficile test was positive in your stool.  Your other stool testing is negative.  C. difficile is a bacteria that can cause severe diarrhea.  I sent vancomycin antibiotic to the pharmacy for treatment.  Please complete the full course of the 10-day antibiotic.  I would also recommend taking a probiotic pill daily over the next 2 to 3 months for treatment.    If your diarrhea returns or worsens after the antibiotic is completion please let me know as you may need to complete a longer course of antibiotic if the diarrhea returns.    C. difficile is very contagious.  Please wash your hands thoroughly after going to the bathroom and clean your bathroom routinely with bleach  to help decrease transmission to others.   Leela Carrion PA-C ..................4/15/2022 9:22 AM

## 2022-04-15 NOTE — TELEPHONE ENCOUNTER
Called patient and notified her of stool results and information given to her on what to do about it. She had no further questions or concerns at this time.  ....Ingrid Baker LPN on 4/15/2022 at 10:04 AM'

## 2022-04-18 ENCOUNTER — TELEPHONE (OUTPATIENT)
Dept: FAMILY MEDICINE | Facility: OTHER | Age: 57
End: 2022-04-18
Payer: COMMERCIAL

## 2022-04-18 ENCOUNTER — HOSPITAL ENCOUNTER (EMERGENCY)
Facility: OTHER | Age: 57
Discharge: HOME | End: 2022-04-18
Attending: STUDENT IN AN ORGANIZED HEALTH CARE EDUCATION/TRAINING PROGRAM | Admitting: STUDENT IN AN ORGANIZED HEALTH CARE EDUCATION/TRAINING PROGRAM
Payer: COMMERCIAL

## 2022-04-18 VITALS
TEMPERATURE: 97 F | OXYGEN SATURATION: 97 % | DIASTOLIC BLOOD PRESSURE: 56 MMHG | SYSTOLIC BLOOD PRESSURE: 129 MMHG | RESPIRATION RATE: 16 BRPM | HEART RATE: 83 BPM | BODY MASS INDEX: 20.91 KG/M2 | WEIGHT: 127 LBS

## 2022-04-18 DIAGNOSIS — K59.00 CONSTIPATION, UNSPECIFIED CONSTIPATION TYPE: ICD-10-CM

## 2022-04-18 PROCEDURE — 99283 EMERGENCY DEPT VISIT LOW MDM: CPT | Performed by: STUDENT IN AN ORGANIZED HEALTH CARE EDUCATION/TRAINING PROGRAM

## 2022-04-18 PROCEDURE — 250N000013 HC RX MED GY IP 250 OP 250 PS 637: Performed by: STUDENT IN AN ORGANIZED HEALTH CARE EDUCATION/TRAINING PROGRAM

## 2022-04-18 RX ORDER — POLYETHYLENE GLYCOL 3350 17 G/17G
17 POWDER, FOR SOLUTION ORAL ONCE
Status: COMPLETED | OUTPATIENT
Start: 2022-04-18 | End: 2022-04-18

## 2022-04-18 RX ORDER — DOCUSATE SODIUM 100 MG/1
100 CAPSULE, LIQUID FILLED ORAL 2 TIMES DAILY
Qty: 60 CAPSULE | Refills: 0 | Status: SHIPPED | OUTPATIENT
Start: 2022-04-18 | End: 2022-08-16

## 2022-04-18 RX ORDER — DOCUSATE SODIUM 100 MG/1
100 CAPSULE, LIQUID FILLED ORAL ONCE
Status: COMPLETED | OUTPATIENT
Start: 2022-04-18 | End: 2022-04-18

## 2022-04-18 RX ORDER — POLYETHYLENE GLYCOL 3350 17 G/17G
1 POWDER, FOR SOLUTION ORAL DAILY
Qty: 527 G | Refills: 0 | Status: SHIPPED | OUTPATIENT
Start: 2022-04-18 | End: 2022-05-19

## 2022-04-18 RX ADMIN — POLYETHYLENE GLYCOL 3350 17 G: 17 POWDER, FOR SOLUTION ORAL at 19:27

## 2022-04-18 RX ADMIN — DOCUSATE SODIUM 100 MG: 100 CAPSULE, LIQUID FILLED ORAL at 19:27

## 2022-04-18 NOTE — ED TRIAGE NOTES
ED Nursing Triage Note (General)   ________________________________    Isabel Talbot is a 57 year old Female that presents to triage private car  With history of being diagnosed with c-diff last Thursday morning, five days ago pt started having decreased stool output and feeling like she was straining against a brick wall (pressure and pain with BMs).    /56   Pulse 83   Temp 97  F (36.1  C)   Resp 16   Wt 57.6 kg (127 lb)   LMP  (LMP Unknown)   SpO2 97%   BMI 20.91 kg/m  t  Patient appears alert , in no acute distress., and cooperative behavior.    GCS Total = 15  Airway: intact  Breathing noted as Normal  Circulation Normal  Skin:  Normal      PRE HOSPITAL PRIOR LIVING SITUATION Other:  Roommate

## 2022-04-18 NOTE — TELEPHONE ENCOUNTER
Patient has C-diff. She has diarrhea but feels like there is a brick (bowel backed up). Consistency of stool is stringy. She states it is painful. Rates pain 9 out of 10. Is this normal for C-diff? Should patient be seen?    Tracey Ashford CMA on 4/18/2022 at 4:50 PM

## 2022-04-18 NOTE — TELEPHONE ENCOUNTER
Writer spoke directly with Dr. Simone Finley. Informed her of what patient told writer. She stated that is not normal to feel like that with C-diff. If pain is 9 out of 10 she needs to go to ER. Patient informed.     Tracey Ashford CMA on 4/18/2022 at 4:54 PM

## 2022-04-18 NOTE — TELEPHONE ENCOUNTER
Patient called to talk to Leela Carrion. She states she was seen last week and diagnosed with C Diff. She feels like there is something blocking because able to have a bowel movement. She is wondering if this is part of C. Diff. She has some questions. Please call.    Shey Philippe on 4/18/2022 at 4:23 PM

## 2022-04-19 NOTE — ED PROVIDER NOTES
History     Chief Complaint   Patient presents with     Constipation     HPI  Isabel Talbot is a 57 year old woman with history of rheumatoid arthritis, paroxysmal A. fib on Eliquis, tobacco use, hypertension, anxiety and depression, chronic diarrhea, and recent diagnosis of C. difficile 4 days ago currently on oral vancomycin who presents for evaluation of discomfort and pain with bowel movements and feeling quite constipated.  Patient reports that she is really only looking for a medication to help her have a bowel movement.  She states that she had been having frequent loose stools with occasional blood with her C. difficile infection, states that has improved and essentially resolved last night but now has been struggling to have a normal bowel movement and feels constipated.  She notes no significant abdominal discomfort and that has overall improved, no fevers or chills, nausea or vomiting.  She otherwise feels well.  No further blood in the stools.       Allergies:  Allergies   Allergen Reactions     Losartan Angioedema and Rash     Possible angioedema on 10/21/19 - patient to follow up with dermatology for confirmation  Previously entered as rash to hands and feet noted 7/17/18 (this allergy was deleted on 8/8/18)     Ace Inhibitors Cough     Cough     Metoprolol Other (See Comments)     Low heart rate       Problem List:    Patient Active Problem List    Diagnosis Date Noted     Current use of long term anticoagulation - Apixaban 10/12/2021     Priority: Medium     Mixed hyperlipidemia 07/20/2021     Priority: Medium     Nicotine dependence, uncomplicated, unspecified nicotine product type 07/20/2021     Priority: Medium     Major depressive disorder with single episode, in full remission (H) 10/30/2019     Priority: Medium     Angioedema 10/22/2019     Priority: Medium     Chronic diarrhea 08/14/2019     Priority: Medium     Encounter for monitoring anti-arrhythmic therapy 07/25/2019     Priority:  Medium     PSVT (paroxysmal supraventricular tachycardia) (H) 07/25/2019     Priority: Medium     Palpitations 07/25/2019     Priority: Medium     Herpes zoster without complication 05/02/2019     Priority: Medium     Tobacco abuse 05/02/2019     Priority: Medium     Poor dentition 12/18/2018     Priority: Medium     Bradycardia 06/28/2018     Priority: Medium     Anemia, unspecified type 03/13/2018     Priority: Medium     Benign essential hypertension 03/13/2018     Priority: Medium     Encounter for tobacco use cessation counseling 03/13/2018     Priority: Medium     Rheumatoid arthritis, involving unspecified site, unspecified rheumatoid factor presence 03/01/2018     Priority: Medium     IMO Regulatory Load OCT 2020       Paroxysmal atrial fibrillation (H) 03/01/2018     Priority: Medium     Vitamin D deficiency 03/01/2018     Priority: Medium     Collagenous colitis 03/01/2018     Priority: Medium     Incontinence of feces, unspecified fecal incontinence type 03/01/2018     Priority: Medium        Past Medical History:    Past Medical History:   Diagnosis Date     Benign essential hypertension 3/13/2018     Encounter for tobacco use cessation counseling 3/13/2018     Hypovitaminosis D 3/1/2018     Paroxysmal atrial fibrillation (H) 3/1/2018     Rheumatoid arthritis, involving unspecified site, unspecified rheumatoid factor presence 3/1/2018       Past Surgical History:    Past Surgical History:   Procedure Laterality Date     COLONOSCOPY N/A 4/2/2018    F/U 2028     COLONOSCOPY  09/04/2019    ESSENTIA follow up 10 years, normal, 9/4/29     HAND SURGERY      s/p fall - age 19 or 20       Family History:    Family History   Problem Relation Age of Onset     Cancer Mother         throat, tonsillar cancer     Heart Disease Father      Hypertension Father      Hyperlipidemia Father        Social History:  Marital Status:  Single [1]  Social History     Tobacco Use     Smoking status: Current Every Day Smoker      Packs/day: 0.50     Years: 38.00     Pack years: 19.00     Types: Cigarettes     Smokeless tobacco: Never Used   Vaping Use     Vaping Use: Never used   Substance Use Topics     Alcohol use: Yes     Comment: occasional.-wine/hard liquor 2 times week     Drug use: Never        Medications:    docusate sodium (COLACE) 100 MG capsule  polyethylene glycol (MIRALAX) 17 GM/Dose powder  amLODIPine (NORVASC) 10 MG tablet  apixaban ANTICOAGULANT (ELIQUIS ANTICOAGULANT) 5 MG tablet  budesonide (ENTOCORT EC) 3 MG EC capsule  citalopram (CELEXA) 20 MG tablet  etanercept (ENBREL SURECLICK) 50 MG/ML autoinjector  etanercept (ENBREL) 50 MG/ML injection  leflunomide (ARAVA) 20 MG tablet  propafenone (RYTHMOL) 150 MG TABS tablet  rosuvastatin (CRESTOR) 20 MG tablet  vancomycin (VANCOCIN) 125 MG capsule          Review of Systems  Please see HPI above for pertinent positives and negatives.  All other systems reviewed and found to be negative.    Physical Exam   BP: 129/56  Pulse: 83  Temp: 97  F (36.1  C)  Resp: 16  Weight: 57.6 kg (127 lb)  SpO2: 97 %      Physical Exam  Gen: Lying in bed, alert, nontoxic, no acute distress  HEENT: Normocephalic and atraumatic, mucous membranes moist, conjunctiva clear  CV: Regular rate and rhythm, appears warm and well-perfused  Pulm: Normal respiratory effort, no audible wheezing or stridor  Abd: Soft, minimal tenderness to deep palpation bilateral lower quadrants, nondistended, no guarding or rebound  Rectal: Deferred  Skin: No rash or other lesions  MSK: No gross deformities or swelling  Neuro: Alert and oriented x4, no focal deficits    ED Course              ED Course as of 04/18/22 2106 Mon Apr 18, 2022 1914 Patient evaluated, here with difficulty having a BM, feeling constipated for the last 12 to 24 hours. States she had had diarrhea with pus and occasional blood last week, diagnosed with C.diff on 4/14 and started taking vancomycin, states the diarrhea has subsided and she is feeling  overall better, no fevers or chills, nausea or vomiting, or abdominal pain but now coming in reporting that she feels like she is having to strain and having pressure with bowel movements in the rectal area.  She feels constipated, has mild lower abdominal tenderness on exam but otherwise benign abdomen and appears very well and nontoxic.  She is requesting a medication for constipation which I think is very reasonable to try and soften up her stools a bit but will not be too aggressive and have patient return if worsening symptoms or pain.  I have a low suspicion for toxic megacolon or other complication of C. difficile at this time, no further testing warranted currently.     Procedures              Critical Care time:  none               No results found for this or any previous visit (from the past 24 hour(s)).    Medications   docusate sodium (COLACE) capsule 100 mg (100 mg Oral Given 4/18/22 1927)   polyethylene glycol (MIRALAX) Packet 17 g (17 g Oral Given 4/18/22 1927)       Assessments & Plan (with Medical Decision Making)   57 year old woman with history of rheumatoid arthritis, paroxysmal A. fib on Eliquis, tobacco use, hypertension, anxiety and depression, chronic diarrhea, and recent diagnosis of C. difficile 4 days ago currently on oral vancomycin who presents for evaluation of discomfort and pain with bowel movements and feeling quite constipated.  Vitally stable here, afebrile, saturating well on room air.  Patient well-appearing and nontoxic with benign abdominal exam.  Bloody stools are improving.  Therefore additional lab testing and imaging not indicated at this time.  Very low suspicion for toxic megacolon or complication related to C. difficile infection, seems to be treated appropriately with oral vancomycin which patient continues.  Plan to provide a bit of a bowel regimen to encourage regular stools as I do think patient is a bit constipated.    From ED discharge instructions:  I suspect you  have some constipation now as you recover from your C. Diff infection. I do not suspect a complication from C. Diff at this time.     Start taking docusate (stool softener) 1 capsule twice daily and MiraLAX once daily (can increase to twice daily for goal of 3 soft moderate to large bowel movements daily). You received first doses of these medications here in the ER [facility] and prescriptions have been sent to your pharmacy.    Continue taking your antibiotic (vancomycin) as prescribed.    Follow up in clinic in 1 to 2 days for a recheck.    Come back sooner or call 911 if new or worsening severe abdominal pain, especially if associated with fever greater than 101 degrees Fahrenheit, vomiting and inability to eat or drink, more blood in the stool, or otherwise feeling sicker.    I have reviewed the nursing notes.    I have reviewed the findings, diagnosis, plan and need for follow up with the patient.       Discharge Medication List as of 4/18/2022  7:22 PM      START taking these medications    Details   docusate sodium (COLACE) 100 MG capsule Take 1 capsule (100 mg) by mouth 2 times daily, Disp-60 capsule, R-0, E-Prescribe      polyethylene glycol (MIRALAX) 17 GM/Dose powder Take 17 g (1 capful) by mouth daily, Disp-527 g, R-0, E-Prescribe             Final diagnoses:   Constipation, unspecified constipation type       4/18/2022   [facility] IslandKoby melendez MD  04/19/22 0656

## 2022-04-19 NOTE — DISCHARGE INSTRUCTIONS
I suspect you have some constipation now as you recover from your C. Diff infection. I do not suspect a complication from C. Diff at this time.     Start taking docusate (stool softener) 1 capsule twice daily and MiraLAX once daily (can increase to twice daily for goal of 3 soft moderate to large bowel movements daily). [facility].    Continue taking your antibiotic (vancomycin) as prescribed.    Follow up in clinic in 1 to 2 days for a recheck.    Come back sooner or call 911 if new or worsening severe abdominal pain, especially if associated with fever greater than 101 degrees Fahrenheit, vomiting and inability to eat or drink, more blood in the stool, or otherwise feeling sicker.

## 2022-04-21 ENCOUNTER — APPOINTMENT (OUTPATIENT)
Dept: CT IMAGING | Facility: OTHER | Age: 57
End: 2022-04-21
Attending: PHYSICIAN ASSISTANT
Payer: COMMERCIAL

## 2022-04-21 ENCOUNTER — HOSPITAL ENCOUNTER (EMERGENCY)
Facility: OTHER | Age: 57
Discharge: HOME | End: 2022-04-21
Attending: PHYSICIAN ASSISTANT | Admitting: PHYSICIAN ASSISTANT
Payer: COMMERCIAL

## 2022-04-21 VITALS
HEIGHT: 65 IN | TEMPERATURE: 95.6 F | DIASTOLIC BLOOD PRESSURE: 63 MMHG | WEIGHT: 120.4 LBS | SYSTOLIC BLOOD PRESSURE: 139 MMHG | RESPIRATION RATE: 15 BRPM | BODY MASS INDEX: 20.06 KG/M2 | OXYGEN SATURATION: 99 % | HEART RATE: 85 BPM

## 2022-04-21 DIAGNOSIS — A04.72 COLITIS DUE TO CLOSTRIDIUM DIFFICILE: ICD-10-CM

## 2022-04-21 DIAGNOSIS — R10.84 ABDOMINAL PAIN, GENERALIZED: ICD-10-CM

## 2022-04-21 DIAGNOSIS — E83.42 HYPOMAGNESEMIA: ICD-10-CM

## 2022-04-21 DIAGNOSIS — E87.6 HYPOKALEMIA: ICD-10-CM

## 2022-04-21 LAB
ALBUMIN SERPL-MCNC: 3.6 G/DL (ref 3.5–5.7)
ALBUMIN UR-MCNC: 10 MG/DL
ALP SERPL-CCNC: 90 U/L (ref 34–104)
ALT SERPL W P-5'-P-CCNC: 11 U/L (ref 7–52)
ANION GAP SERPL CALCULATED.3IONS-SCNC: 13 MMOL/L (ref 3–14)
APPEARANCE UR: CLEAR
AST SERPL W P-5'-P-CCNC: 14 U/L (ref 13–39)
BASOPHILS # BLD AUTO: 0.1 10E3/UL (ref 0–0.2)
BASOPHILS NFR BLD AUTO: 1 %
BILIRUB SERPL-MCNC: 0.4 MG/DL (ref 0.3–1)
BILIRUB UR QL STRIP: NEGATIVE
BUN SERPL-MCNC: 10 MG/DL (ref 7–25)
CALCIUM SERPL-MCNC: 9.4 MG/DL (ref 8.6–10.3)
CHLORIDE BLD-SCNC: 98 MMOL/L (ref 98–107)
CO2 SERPL-SCNC: 25 MMOL/L (ref 21–31)
COLOR UR AUTO: ABNORMAL
CREAT SERPL-MCNC: 0.69 MG/DL (ref 0.6–1.2)
EOSINOPHIL # BLD AUTO: 0.4 10E3/UL (ref 0–0.7)
EOSINOPHIL NFR BLD AUTO: 6 %
ERYTHROCYTE [DISTWIDTH] IN BLOOD BY AUTOMATED COUNT: 13.3 % (ref 10–15)
GFR SERPL CREATININE-BSD FRML MDRD: >90 ML/MIN/1.73M2
GLUCOSE BLD-MCNC: 91 MG/DL (ref 70–105)
GLUCOSE UR STRIP-MCNC: NEGATIVE MG/DL
HCT VFR BLD AUTO: 34.3 % (ref 35–47)
HGB BLD-MCNC: 11.7 G/DL (ref 11.7–15.7)
HGB UR QL STRIP: ABNORMAL
IMM GRANULOCYTES # BLD: 0 10E3/UL
IMM GRANULOCYTES NFR BLD: 0 %
KETONES UR STRIP-MCNC: NEGATIVE MG/DL
LACTATE SERPL-SCNC: 1.3 MMOL/L (ref 0.7–2)
LEUKOCYTE ESTERASE UR QL STRIP: ABNORMAL
LIPASE SERPL-CCNC: 126 U/L (ref 11–82)
LYMPHOCYTES # BLD AUTO: 1.5 10E3/UL (ref 0.8–5.3)
LYMPHOCYTES NFR BLD AUTO: 21 %
MAGNESIUM SERPL-MCNC: 1.7 MG/DL (ref 1.9–2.7)
MCH RBC QN AUTO: 30.5 PG (ref 26.5–33)
MCHC RBC AUTO-ENTMCNC: 34.1 G/DL (ref 31.5–36.5)
MCV RBC AUTO: 90 FL (ref 78–100)
MONOCYTES # BLD AUTO: 0.9 10E3/UL (ref 0–1.3)
MONOCYTES NFR BLD AUTO: 13 %
MUCOUS THREADS #/AREA URNS LPF: PRESENT /LPF
NEUTROPHILS # BLD AUTO: 4.3 10E3/UL (ref 1.6–8.3)
NEUTROPHILS NFR BLD AUTO: 59 %
NITRATE UR QL: NEGATIVE
NRBC # BLD AUTO: 0 10E3/UL
NRBC BLD AUTO-RTO: 0 /100
PH UR STRIP: 7 [PH] (ref 5–9)
PLATELET # BLD AUTO: 365 10E3/UL (ref 150–450)
POTASSIUM BLD-SCNC: 2.8 MMOL/L (ref 3.5–5.1)
PROT SERPL-MCNC: 7.2 G/DL (ref 6.4–8.9)
RBC # BLD AUTO: 3.83 10E6/UL (ref 3.8–5.2)
RBC URINE: 3 /HPF
SODIUM SERPL-SCNC: 136 MMOL/L (ref 134–144)
SP GR UR STRIP: 1.03 (ref 1–1.03)
SQUAMOUS EPITHELIAL: 9 /HPF
UROBILINOGEN UR STRIP-MCNC: NORMAL MG/DL
WBC # BLD AUTO: 7.1 10E3/UL (ref 4–11)
WBC URINE: 4 /HPF

## 2022-04-21 PROCEDURE — 99284 EMERGENCY DEPT VISIT MOD MDM: CPT | Performed by: PHYSICIAN ASSISTANT

## 2022-04-21 PROCEDURE — 85025 COMPLETE CBC W/AUTO DIFF WBC: CPT | Performed by: PHYSICIAN ASSISTANT

## 2022-04-21 PROCEDURE — 74177 CT ABD & PELVIS W/CONTRAST: CPT

## 2022-04-21 PROCEDURE — 250N000011 HC RX IP 250 OP 636: Performed by: PHYSICIAN ASSISTANT

## 2022-04-21 PROCEDURE — 83605 ASSAY OF LACTIC ACID: CPT | Performed by: PHYSICIAN ASSISTANT

## 2022-04-21 PROCEDURE — 96365 THER/PROPH/DIAG IV INF INIT: CPT | Mod: XU | Performed by: PHYSICIAN ASSISTANT

## 2022-04-21 PROCEDURE — 81001 URINALYSIS AUTO W/SCOPE: CPT | Performed by: PHYSICIAN ASSISTANT

## 2022-04-21 PROCEDURE — 93010 ELECTROCARDIOGRAM REPORT: CPT | Performed by: INTERNAL MEDICINE

## 2022-04-21 PROCEDURE — 83690 ASSAY OF LIPASE: CPT | Performed by: PHYSICIAN ASSISTANT

## 2022-04-21 PROCEDURE — 83735 ASSAY OF MAGNESIUM: CPT | Performed by: PHYSICIAN ASSISTANT

## 2022-04-21 PROCEDURE — 258N000003 HC RX IP 258 OP 636: Performed by: PHYSICIAN ASSISTANT

## 2022-04-21 PROCEDURE — 36415 COLL VENOUS BLD VENIPUNCTURE: CPT | Performed by: PHYSICIAN ASSISTANT

## 2022-04-21 PROCEDURE — 80053 COMPREHEN METABOLIC PANEL: CPT | Performed by: PHYSICIAN ASSISTANT

## 2022-04-21 PROCEDURE — 93005 ELECTROCARDIOGRAM TRACING: CPT | Performed by: PHYSICIAN ASSISTANT

## 2022-04-21 PROCEDURE — 99285 EMERGENCY DEPT VISIT HI MDM: CPT | Mod: 25 | Performed by: PHYSICIAN ASSISTANT

## 2022-04-21 PROCEDURE — 87186 SC STD MICRODIL/AGAR DIL: CPT | Performed by: PHYSICIAN ASSISTANT

## 2022-04-21 PROCEDURE — 250N000013 HC RX MED GY IP 250 OP 250 PS 637: Performed by: PHYSICIAN ASSISTANT

## 2022-04-21 RX ORDER — IOPAMIDOL 755 MG/ML
69 INJECTION, SOLUTION INTRAVASCULAR ONCE
Status: COMPLETED | OUTPATIENT
Start: 2022-04-21 | End: 2022-04-21

## 2022-04-21 RX ORDER — POTASSIUM CHLORIDE 1500 MG/1
40 TABLET, EXTENDED RELEASE ORAL ONCE
Status: COMPLETED | OUTPATIENT
Start: 2022-04-21 | End: 2022-04-21

## 2022-04-21 RX ORDER — MAGNESIUM SULFATE HEPTAHYDRATE 40 MG/ML
2 INJECTION, SOLUTION INTRAVENOUS ONCE
Status: COMPLETED | OUTPATIENT
Start: 2022-04-21 | End: 2022-04-21

## 2022-04-21 RX ADMIN — POTASSIUM CHLORIDE 40 MEQ: 1500 TABLET, EXTENDED RELEASE ORAL at 12:13

## 2022-04-21 RX ADMIN — IOPAMIDOL 69 ML: 755 INJECTION, SOLUTION INTRAVENOUS at 12:04

## 2022-04-21 RX ADMIN — SODIUM CHLORIDE 1000 ML: 9 INJECTION, SOLUTION INTRAVENOUS at 11:51

## 2022-04-21 RX ADMIN — MAGNESIUM SULFATE HEPTAHYDRATE 2 G: 40 INJECTION, SOLUTION INTRAVENOUS at 12:48

## 2022-04-21 ASSESSMENT — ENCOUNTER SYMPTOMS
FEVER: 0
SHORTNESS OF BREATH: 0
VOMITING: 0
ABDOMINAL PAIN: 0
CONFUSION: 0
RECTAL PAIN: 1
NAUSEA: 0
DYSURIA: 0

## 2022-04-21 NOTE — ED TRIAGE NOTES
"ED Nursing Triage Note (General)   ________________________________    Isabel Talbot is a 57 year old Female that presents to triage private car  With history of  Bowel problems including c diff dx one week ago and taking vancomycin for this.  Feels like she has a blockage and is constipated but passing white foaming loose stool. reported by patient   Significant symptoms had onset 1 week(s) ago.  /63   Pulse 85   Temp (!) 95.6  F (35.3  C) (Tympanic)   Resp 15   Ht 1.651 m (5' 5\")   Wt 54.6 kg (120 lb 6.4 oz)   LMP  (LMP Unknown)   SpO2 99%   BMI 20.04 kg/m  t  Patient appears alert , in moderate distress., and cooperative behavior.  Anxiety:   GCS Total = 15  Airway: intact  Breathing noted as Normal  Circulation Normal  Skin:  Normal  Action taken:  To room 907      PRE HOSPITAL PRIOR LIVING SITUATION room mate  "

## 2022-04-21 NOTE — DISCHARGE INSTRUCTIONS
Get plenty of fluids and rest.  As discussed, all of your lab work appeared well however you did have slight decrease in your potassium and magnesium levels.  Follow the instructions attached with regards to those lower levels.  I discussed your case with our surgeon.  It is felt that unfortunately the discomfort you are feeling is probably due to the inflammation near your rectum and that this is part of the healing process due to your C. difficile infection.  Continue with the Colace and MiraLAX as previously instructed.  We would expect symptoms to gradually improve in the coming days.  Referral is placed for you to follow-up with PCP tomorrow morning at 0 840 for reassessment.  Return to the ED if there are worsening or concerning symptoms, especially increased fevers, intractable pain, etc.

## 2022-04-21 NOTE — ED PROVIDER NOTES
"  History     Chief Complaint   Patient presents with     Constipation     Two ED visits in the past week and told she should take Miralax but not passing stool just white \"pus like foam\"     HPI  Isabel Talbot is a 57 year old female who presents to the ED for evaluation of constipation/ Cdiff problem. She was dx with Cdiff ~ 1 week ago. On Vancomycin. She reports pain when attempting to have bowel movement otherwise fairly pain free. She was started on MiraLAX and Colace few days prior.  She denies any fevers, chest pain, shortness of breath, dysuria.  She reports that her bowel movements are fairly small and consist mostly of mucus.  She still has approximately 4 days of vancomycin medication remaining.    Allergies:  Allergies   Allergen Reactions     Losartan Angioedema and Rash     Possible angioedema on 10/21/19 - patient to follow up with dermatology for confirmation  Previously entered as rash to hands and feet noted 7/17/18 (this allergy was deleted on 8/8/18)     Ace Inhibitors Cough     Cough     Metoprolol Other (See Comments)     Low heart rate       Problem List:    Patient Active Problem List    Diagnosis Date Noted     Current use of long term anticoagulation - Apixaban 10/12/2021     Priority: Medium     Mixed hyperlipidemia 07/20/2021     Priority: Medium     Nicotine dependence, uncomplicated, unspecified nicotine product type 07/20/2021     Priority: Medium     Major depressive disorder with single episode, in full remission (H) 10/30/2019     Priority: Medium     Angioedema 10/22/2019     Priority: Medium     Chronic diarrhea 08/14/2019     Priority: Medium     Encounter for monitoring anti-arrhythmic therapy 07/25/2019     Priority: Medium     PSVT (paroxysmal supraventricular tachycardia) (H) 07/25/2019     Priority: Medium     Palpitations 07/25/2019     Priority: Medium     Herpes zoster without complication 05/02/2019     Priority: Medium     Tobacco abuse 05/02/2019     Priority: " Medium     Poor dentition 12/18/2018     Priority: Medium     Bradycardia 06/28/2018     Priority: Medium     Anemia, unspecified type 03/13/2018     Priority: Medium     Benign essential hypertension 03/13/2018     Priority: Medium     Encounter for tobacco use cessation counseling 03/13/2018     Priority: Medium     Rheumatoid arthritis, involving unspecified site, unspecified rheumatoid factor presence 03/01/2018     Priority: Medium     IMO Regulatory Load OCT 2020       Paroxysmal atrial fibrillation (H) 03/01/2018     Priority: Medium     Vitamin D deficiency 03/01/2018     Priority: Medium     Collagenous colitis 03/01/2018     Priority: Medium     Incontinence of feces, unspecified fecal incontinence type 03/01/2018     Priority: Medium        Past Medical History:    Past Medical History:   Diagnosis Date     Benign essential hypertension 3/13/2018     Encounter for tobacco use cessation counseling 3/13/2018     Hypovitaminosis D 3/1/2018     Paroxysmal atrial fibrillation (H) 3/1/2018     Rheumatoid arthritis, involving unspecified site, unspecified rheumatoid factor presence 3/1/2018       Past Surgical History:    Past Surgical History:   Procedure Laterality Date     COLONOSCOPY N/A 4/2/2018    F/U 2028     COLONOSCOPY  09/04/2019    ESSENTIA follow up 10 years, normal, 9/4/29     HAND SURGERY      s/p fall - age 19 or 20       Family History:    Family History   Problem Relation Age of Onset     Cancer Mother         throat, tonsillar cancer     Heart Disease Father      Hypertension Father      Hyperlipidemia Father        Social History:  Marital Status:  Single [1]  Social History     Tobacco Use     Smoking status: Current Every Day Smoker     Packs/day: 0.50     Years: 38.00     Pack years: 19.00     Types: Cigarettes     Smokeless tobacco: Never Used   Vaping Use     Vaping Use: Never used   Substance Use Topics     Alcohol use: Yes     Comment: occasional.-wine/hard liquor 2 times week     Drug  "use: Never        Medications:    amLODIPine (NORVASC) 10 MG tablet  apixaban ANTICOAGULANT (ELIQUIS ANTICOAGULANT) 5 MG tablet  budesonide (ENTOCORT EC) 3 MG EC capsule  citalopram (CELEXA) 20 MG tablet  docusate sodium (COLACE) 100 MG capsule  etanercept (ENBREL SURECLICK) 50 MG/ML autoinjector  etanercept (ENBREL) 50 MG/ML injection  leflunomide (ARAVA) 20 MG tablet  polyethylene glycol (MIRALAX) 17 GM/Dose powder  propafenone (RYTHMOL) 150 MG TABS tablet  rosuvastatin (CRESTOR) 20 MG tablet  vancomycin (VANCOCIN) 125 MG capsule  vancomycin (VANCOCIN) 125 MG capsule          Review of Systems   Constitutional: Negative for fever.   HENT: Negative for congestion.    Eyes: Negative for visual disturbance.   Respiratory: Negative for shortness of breath.    Cardiovascular: Negative for chest pain.   Gastrointestinal: Positive for rectal pain. Negative for abdominal pain, nausea and vomiting.   Genitourinary: Negative for dysuria.   Psychiatric/Behavioral: Negative for confusion.       Physical Exam   BP: 139/63  Pulse: 85  Temp: (!) 95.6  F (35.3  C)  Resp: 15  Height: 165.1 cm (5' 5\")  Weight: 54.6 kg (120 lb 6.4 oz)  SpO2: 99 %      Physical Exam  Constitutional:       General: She is not in acute distress.     Appearance: She is well-developed. She is not diaphoretic.   HENT:      Head: Normocephalic and atraumatic.   Eyes:      General: No scleral icterus.     Conjunctiva/sclera: Conjunctivae normal.   Cardiovascular:      Rate and Rhythm: Normal rate and regular rhythm.   Pulmonary:      Effort: Pulmonary effort is normal.      Breath sounds: Normal breath sounds.   Abdominal:      Palpations: Abdomen is soft.      Tenderness: There is abdominal tenderness. There is no right CVA tenderness, left CVA tenderness, guarding or rebound.   Musculoskeletal:         General: No deformity.      Cervical back: Neck supple.   Lymphadenopathy:      Cervical: No cervical adenopathy.   Skin:     General: Skin is warm and " dry.      Coloration: Skin is not jaundiced.      Findings: No rash.   Neurological:      Mental Status: She is alert and oriented to person, place, and time. Mental status is at baseline.   Psychiatric:         Mood and Affect: Mood normal.         Behavior: Behavior normal.         ED Course              ED Course as of 04/25/22 1141   Sat Apr 23, 2022   1508 Culture(!): 50,000-100,000 CFU/mL Escherichia coli   1509 Urine Culture(!)     Procedures         EKG read at 1220. HR 68, NSR, prolonged QT, no ST changes.     Critical Care time:  none               No results found for this or any previous visit (from the past 24 hour(s)).    Medications   0.9% sodium chloride BOLUS (0 mLs Intravenous Stopped 4/21/22 1356)   iopamidol (ISOVUE-370) solution 69 mL (69 mLs Intravenous Given 4/21/22 1204)   potassium chloride ER (KLOR-CON M) CR tablet 40 mEq (40 mEq Oral Given 4/21/22 1213)   magnesium sulfate 2 g in water intermittent infusion (0 g Intravenous Stopped 4/21/22 1356)       Assessments & Plan (with Medical Decision Making)   Nontoxic in no acute distress.  Heart, lung, bowel sounds are normal.  Abdomen appears soft with some slight tenderness to palpation in the lower portions.  Vital signs are stable and she is afebrile.    She had slight hypomagnesemia and hypokalemia, these were replaced and she was given fluids.  CT scan was read as circumferential thickening of descending and sigmoid colon but without signs of megacolon, abscess, etc.  I discussed case with Dr. Zazueta, surgeon on-call today.  It is felt that she is still in the healing process from her recent C. difficile infection and the symptoms she is feeling is most likely connected to her inflamed colon that will take time to continue to calm down.  Is recommended to continue with the MiraLAX and Colace.  I did get a referral for her to follow-up closely with PCP for close reassessment and she is told to return if there are worse or concerning  symptoms.  She understands and agrees with plan the patient is discharged.    Jean Ly PA-C    I have reviewed the nursing notes.    I have reviewed the findings, diagnosis, plan and need for follow up with the patient.       Discharge Medication List as of 4/21/2022  1:57 PM          Final diagnoses:   Colitis due to Clostridium difficile   Abdominal pain, generalized   Hypokalemia   Hypomagnesemia       4/21/2022   [facility]     Jean Ly PA  04/21/22 1426       Jean Ly PA  04/25/22 1141

## 2022-04-21 NOTE — LETTER
April 21, 2022      To Whom It May Concern:      Isabel Talbot was seen in our Emergency Department today, 04/21/22.  I expect her condition to improve over the next 5 days.  She may return to work when improved.    Sincerely,        VANNESSA Bartlett

## 2022-04-23 LAB — BACTERIA UR CULT: ABNORMAL

## 2022-04-23 RX ORDER — VANCOMYCIN HYDROCHLORIDE 125 MG/1
125 CAPSULE ORAL 4 TIMES DAILY
Qty: 16 CAPSULE | Refills: 0 | Status: SHIPPED | OUTPATIENT
Start: 2022-04-23 | End: 2022-04-27

## 2022-04-23 NOTE — ED PROVIDER NOTES
I was contacted by lab on 4/23.  Patient's urine culture did grew out E. coli.  I did call the patient since she continues not to have any concerning urinary infectious symptoms.  She does continue to have some ongoing diarrhea and is on day 9 of her vancomycin medication.  Therefore, I think we will hold off on treating her with more antibiotics for a asymptomatic UTI at this time and I will add 4 more days to her 10-day course of vancomycin.  I did encourage her to return to the ED for reassessment but  she would prefer to attempt a few more days of medication first.  Unfortunately, she was not able to make her PCP appointment yesterday for reassessment and so I did place referral for her to follow-up again on Monday.  She is told to return to ED if she is having intractable diarrhea or increased fevers/abdominal pain etc.  She understands and agrees with plan.     Jean Ly PA  04/23/22 1522

## 2022-04-24 LAB
ATRIAL RATE - MUSE: 68 BPM
DIASTOLIC BLOOD PRESSURE - MUSE: NORMAL MMHG
INTERPRETATION ECG - MUSE: NORMAL
P AXIS - MUSE: 85 DEGREES
PR INTERVAL - MUSE: 130 MS
QRS DURATION - MUSE: 94 MS
QT - MUSE: 452 MS
QTC - MUSE: 480 MS
R AXIS - MUSE: 22 DEGREES
SYSTOLIC BLOOD PRESSURE - MUSE: NORMAL MMHG
T AXIS - MUSE: 60 DEGREES
VENTRICULAR RATE- MUSE: 68 BPM

## 2022-04-27 ENCOUNTER — OFFICE VISIT (OUTPATIENT)
Dept: FAMILY MEDICINE | Facility: OTHER | Age: 57
End: 2022-04-27
Attending: PHYSICIAN ASSISTANT
Payer: COMMERCIAL

## 2022-04-27 VITALS
OXYGEN SATURATION: 96 % | HEART RATE: 73 BPM | TEMPERATURE: 96 F | SYSTOLIC BLOOD PRESSURE: 106 MMHG | BODY MASS INDEX: 19.97 KG/M2 | RESPIRATION RATE: 22 BRPM | DIASTOLIC BLOOD PRESSURE: 78 MMHG | WEIGHT: 120 LBS

## 2022-04-27 DIAGNOSIS — E87.6 HYPOKALEMIA: ICD-10-CM

## 2022-04-27 DIAGNOSIS — A04.72 COLITIS DUE TO CLOSTRIDIUM DIFFICILE: ICD-10-CM

## 2022-04-27 DIAGNOSIS — R10.84 ABDOMINAL PAIN, GENERALIZED: ICD-10-CM

## 2022-04-27 DIAGNOSIS — E83.42 HYPOMAGNESEMIA: ICD-10-CM

## 2022-04-27 LAB
ANION GAP SERPL CALCULATED.3IONS-SCNC: 10 MMOL/L (ref 3–14)
BASOPHILS # BLD AUTO: 0.1 10E3/UL (ref 0–0.2)
BASOPHILS NFR BLD AUTO: 1 %
BUN SERPL-MCNC: 10 MG/DL (ref 7–25)
CALCIUM SERPL-MCNC: 9.3 MG/DL (ref 8.6–10.3)
CHLORIDE BLD-SCNC: 99 MMOL/L (ref 98–107)
CO2 SERPL-SCNC: 27 MMOL/L (ref 21–31)
CREAT SERPL-MCNC: 0.79 MG/DL (ref 0.6–1.2)
EOSINOPHIL # BLD AUTO: 0.3 10E3/UL (ref 0–0.7)
EOSINOPHIL NFR BLD AUTO: 4 %
ERYTHROCYTE [DISTWIDTH] IN BLOOD BY AUTOMATED COUNT: 13.3 % (ref 10–15)
GFR SERPL CREATININE-BSD FRML MDRD: 86 ML/MIN/1.73M2
GLUCOSE BLD-MCNC: 82 MG/DL (ref 70–105)
HCT VFR BLD AUTO: 32.5 % (ref 35–47)
HGB BLD-MCNC: 11.3 G/DL (ref 11.7–15.7)
IMM GRANULOCYTES # BLD: 0 10E3/UL
IMM GRANULOCYTES NFR BLD: 0 %
LYMPHOCYTES # BLD AUTO: 1.5 10E3/UL (ref 0.8–5.3)
LYMPHOCYTES NFR BLD AUTO: 21 %
MAGNESIUM SERPL-MCNC: 2 MG/DL (ref 1.9–2.7)
MCH RBC QN AUTO: 31 PG (ref 26.5–33)
MCHC RBC AUTO-ENTMCNC: 34.8 G/DL (ref 31.5–36.5)
MCV RBC AUTO: 89 FL (ref 78–100)
MONOCYTES # BLD AUTO: 1.1 10E3/UL (ref 0–1.3)
MONOCYTES NFR BLD AUTO: 15 %
NEUTROPHILS # BLD AUTO: 4 10E3/UL (ref 1.6–8.3)
NEUTROPHILS NFR BLD AUTO: 59 %
NRBC # BLD AUTO: 0 10E3/UL
NRBC BLD AUTO-RTO: 0 /100
PLATELET # BLD AUTO: 365 10E3/UL (ref 150–450)
POTASSIUM BLD-SCNC: 2.8 MMOL/L (ref 3.5–5.1)
RBC # BLD AUTO: 3.65 10E6/UL (ref 3.8–5.2)
SODIUM SERPL-SCNC: 136 MMOL/L (ref 134–144)
WBC # BLD AUTO: 6.9 10E3/UL (ref 4–11)

## 2022-04-27 PROCEDURE — 83735 ASSAY OF MAGNESIUM: CPT | Mod: ZL | Performed by: PHYSICIAN ASSISTANT

## 2022-04-27 PROCEDURE — 85025 COMPLETE CBC W/AUTO DIFF WBC: CPT | Mod: ZL | Performed by: PHYSICIAN ASSISTANT

## 2022-04-27 PROCEDURE — 36415 COLL VENOUS BLD VENIPUNCTURE: CPT | Mod: ZL | Performed by: PHYSICIAN ASSISTANT

## 2022-04-27 PROCEDURE — 99213 OFFICE O/P EST LOW 20 MIN: CPT | Performed by: PHYSICIAN ASSISTANT

## 2022-04-27 PROCEDURE — 80048 BASIC METABOLIC PNL TOTAL CA: CPT | Mod: ZL | Performed by: PHYSICIAN ASSISTANT

## 2022-04-27 RX ORDER — VANCOMYCIN HYDROCHLORIDE 125 MG/1
CAPSULE ORAL
Qty: 100 CAPSULE | Refills: 0 | Status: SHIPPED | OUTPATIENT
Start: 2022-04-27 | End: 2022-08-16

## 2022-04-27 RX ORDER — LEFLUNOMIDE 20 MG/1
20 TABLET ORAL DAILY
COMMUNITY
Start: 2022-04-05

## 2022-04-27 RX ORDER — POTASSIUM CHLORIDE 1500 MG/1
20 TABLET, EXTENDED RELEASE ORAL 2 TIMES DAILY
Qty: 20 TABLET | Refills: 0 | Status: SHIPPED | OUTPATIENT
Start: 2022-04-27 | End: 2022-05-07

## 2022-04-27 ASSESSMENT — ENCOUNTER SYMPTOMS
WHEEZING: 0
SHORTNESS OF BREATH: 0
COUGH: 0
PSYCHIATRIC NEGATIVE: 1
DYSURIA: 0
LIGHT-HEADEDNESS: 0
HEMATURIA: 0
DIARRHEA: 1
CONSTIPATION: 0
ABDOMINAL PAIN: 0
VOMITING: 0
NAUSEA: 0
MYALGIAS: 0
FEVER: 0
DIZZINESS: 0
FREQUENCY: 0
PALPITATIONS: 0
FLANK PAIN: 0
HEMATOCHEZIA: 0
CHILLS: 0

## 2022-04-27 ASSESSMENT — PATIENT HEALTH QUESTIONNAIRE - PHQ9
SUM OF ALL RESPONSES TO PHQ QUESTIONS 1-9: 5
10. IF YOU CHECKED OFF ANY PROBLEMS, HOW DIFFICULT HAVE THESE PROBLEMS MADE IT FOR YOU TO DO YOUR WORK, TAKE CARE OF THINGS AT HOME, OR GET ALONG WITH OTHER PEOPLE: NOT DIFFICULT AT ALL
SUM OF ALL RESPONSES TO PHQ QUESTIONS 1-9: 5

## 2022-04-27 ASSESSMENT — PAIN SCALES - GENERAL: PAINLEVEL: NO PAIN (0)

## 2022-04-27 NOTE — PATIENT INSTRUCTIONS
Patient had colonoscopy 5/10/2021.  Adenomatous polyps removed.  Anal polypoid lesion.  Patient referred to colorectal surgeon.  Surgeon found anal lesion and scheduled transanal resection in OR.  Try small amounts of food and drink frequently. Offer a bland diet. Advance as tolerated. Avoid dairy products the first day. You may add yogurt tomorrow as the first dairy product.    Continued vancomycin treatment.   Take a probiotic pill daily over the next 2 to 3 months.    Please set up a lab only appointment in 1 month to ensure that the blood in your urine has resolved to give a repeat urine sample.    Try the BRAT diet (bread, bananas, rice, applesauce, tea, toast).  This is a very bland diet which should not irritate your colon.  Hold off on spicy foods, red sauces, mexican or chinese food.    Call or return to clinic as needed if your symptoms worsen or fail to improve as anticipated.     If the pain does not begin improving, localizes to the right lower belly, there is increased fever, or other progression of symptoms, return for reassessment.    Should I see a doctor or nurse about my stomach ache? -- Most people do not need to see a doctor or nurse for a stomach ache. But you should see your doctor or nurse if:  ?You have bloody bowel movements, diarrhea, or vomiting  ?Your pain is severe and lasts more than an hour or comes and goes for more than 24 hours  ?You cannot eat or drink for hours  ?You have a fever higher than 102 F (39 C)  ?You lose a lot of weight without trying to, or lose interest in food

## 2022-04-27 NOTE — PROGRESS NOTES
Assessment & Plan   Problem List Items Addressed This Visit    None     Visit Diagnoses     Colitis due to Clostridium difficile        Relevant Medications    vancomycin (VANCOCIN) 125 MG capsule    Other Relevant Orders    CBC and Differential (Completed)    Basic Metabolic Panel (Completed)    Abdominal pain, generalized        Hypokalemia        Relevant Medications    vancomycin (VANCOCIN) 125 MG capsule    potassium chloride ER (KLOR-CON M) 20 MEQ CR tablet    Other Relevant Orders    CBC and Differential (Completed)    Basic Metabolic Panel (Completed)    Potassium    Hypomagnesemia        Relevant Orders    Magnesium (Completed)         Complete labs for monitoring including CBC, BMP, and magnesium.  Potassium level decreased at 2.8.  Labs are otherwise unremarkable.  Started on a potassium supplement.  Encourage close recheck for her potassium in 1 to 2 weeks.  Discussed symptoms at length.  Refilled vancomycin for a longer taper to help treat the Clostridium difficile.  Gave warning signs and symptoms.  Encourage close follow-up if symptoms are not improving or worsening.  Encouraged to continue taking a probiotic pill daily over the next couple months.       See Patient Instructions    No follow-ups on file.    Leela Carrion PA-C  [facility] AND [facility]   Isabel is a 57 year old who presents for the following health issues     History of Present Illness       Mental Health Follow-up:                    Today's PHQ-9         PHQ-9 Total Score: 5  PHQ-9 Q9 Thoughts of better off dead/self-harm past 2 weeks :   Not at all    How difficult have these problems made it for you to do your work, take care of things at home, or get along with other people: Not difficult at all        Reason for visit:  Emergency follow up  Symptom onset:  More than a month  Symptoms include:  Severe diarrhea  Symptom intensity:  Severe  Symptom progression:  Staying the same  Had these symptoms before:   No  What makes it worse:  Eating  What makes it better:  No    She eats 0-1 servings of fruits and vegetables daily.She consumes 0 sweetened beverage(s) daily.She exercises with enough effort to increase her heart rate 9 or less minutes per day.  She exercises with enough effort to increase her heart rate 5 days per week.   She is taking medications regularly.         Diarrhea  Onset/Duration: 5 weeks   Description:       Consistency of stool: runny, green, explosive, mucousy and with pus       Blood in stool: no       Number of loose stools past 24 hours: 7  Progression of Symptoms: waxing and waning  Accompanying signs and symptoms:       Fever: no       Nausea/Vomiting: no       Abdominal pain: YES- when having a BM       Weight loss: YES       Episodes of constipation: no  History   Ill contacts: no  Recent use of antibiotics: YES  Recent travels: no  Recent medication-new or changes(Rx or OTC): no  Precipitating or alleviating factors: None  Therapies tried and outcome: Imodium right ear      Patient has been struggling with persistent diarrhea.  Diagnosed with C. difficile.  Has a decreased appetite.  Eating yogurt and rice.  Ate chicken and stuffing this past evening.  Had 7 diarrheal episodes of the night previously.  Last night had 4.  Currently taking her vancomycin.  Has 1 day left.  Has some cramping with the bowel movements.  No blood in the stool or urine.  No black tarry stools.  Keeping food and fluids down.  Had a potassium level that was decreased in the emergency room.  Was given potassium supplement while in the ER.  No fevers or chills.    Review of Systems   Constitutional: Negative for chills and fever.   Respiratory: Negative for cough, shortness of breath and wheezing.    Cardiovascular: Negative for chest pain and palpitations.   Gastrointestinal: Positive for diarrhea. Negative for abdominal pain, constipation, hematochezia, nausea and vomiting.   Genitourinary: Negative for dysuria, flank  pain, frequency, hematuria and urgency.   Musculoskeletal: Negative for myalgias.   Skin: Negative for rash.   Neurological: Negative for dizziness and light-headedness.   Psychiatric/Behavioral: Negative.             Objective    /78 (BP Location: Right arm, Patient Position: Sitting, Cuff Size: Adult Regular)   Pulse 73   Temp (!) 96  F (35.6  C) (Tympanic)   Resp 22   Wt 54.4 kg (120 lb)   LMP  (LMP Unknown)   SpO2 96%   Breastfeeding No   BMI 19.97 kg/m    Body mass index is 19.97 kg/m .  Physical Exam  Vitals and nursing note reviewed.   Constitutional:       General: She is not in acute distress.     Appearance: Normal appearance. She is well-developed.   Neck:      Thyroid: No thyromegaly.      Trachea: No tracheal deviation.   Cardiovascular:      Rate and Rhythm: Normal rate and regular rhythm.      Pulses: Normal pulses.      Heart sounds: Normal heart sounds, S1 normal and S2 normal. No murmur heard.    No friction rub. No S3 or S4 sounds.   Pulmonary:      Effort: Pulmonary effort is normal. No respiratory distress.      Breath sounds: Normal breath sounds. No wheezing or rales.   Abdominal:      General: Abdomen is flat. Bowel sounds are normal.      Palpations: Abdomen is soft. There is no mass.      Tenderness: There is no abdominal tenderness. There is no right CVA tenderness, left CVA tenderness, guarding or rebound.      Hernia: No hernia is present.   Musculoskeletal:         General: Normal range of motion.   Skin:     General: Skin is warm and dry.      Findings: No rash.   Neurological:      General: No focal deficit present.      Mental Status: She is alert and oriented to person, place, and time.      Motor: No abnormal muscle tone.      Deep Tendon Reflexes: Reflexes are normal and symmetric.   Psychiatric:         Mood and Affect: Mood normal.         Behavior: Behavior normal.         Thought Content: Thought content normal.         Judgment: Judgment normal.        Labs:    Results for orders placed or performed in visit on 04/27/22   Magnesium     Status: Normal   Result Value Ref Range    Magnesium 2.0 1.9 - 2.7 mg/dL   Basic Metabolic Panel     Status: Abnormal   Result Value Ref Range    Sodium 136 134 - 144 mmol/L    Potassium 2.8 (L) 3.5 - 5.1 mmol/L    Chloride 99 98 - 107 mmol/L    Carbon Dioxide (CO2) 27 21 - 31 mmol/L    Anion Gap 10 3 - 14 mmol/L    Urea Nitrogen 10 7 - 25 mg/dL    Creatinine 0.79 0.60 - 1.20 mg/dL    Calcium 9.3 8.6 - 10.3 mg/dL    Glucose 82 70 - 105 mg/dL    GFR Estimate 86 >60 mL/min/1.73m2   CBC with platelets and differential     Status: Abnormal   Result Value Ref Range    WBC Count 6.9 4.0 - 11.0 10e3/uL    RBC Count 3.65 (L) 3.80 - 5.20 10e6/uL    Hemoglobin 11.3 (L) 11.7 - 15.7 g/dL    Hematocrit 32.5 (L) 35.0 - 47.0 %    MCV 89 78 - 100 fL    MCH 31.0 26.5 - 33.0 pg    MCHC 34.8 31.5 - 36.5 g/dL    RDW 13.3 10.0 - 15.0 %    Platelet Count 365 150 - 450 10e3/uL    % Neutrophils 59 %    % Lymphocytes 21 %    % Monocytes 15 %    % Eosinophils 4 %    % Basophils 1 %    % Immature Granulocytes 0 %    NRBCs per 100 WBC 0 <1 /100    Absolute Neutrophils 4.0 1.6 - 8.3 10e3/uL    Absolute Lymphocytes 1.5 0.8 - 5.3 10e3/uL    Absolute Monocytes 1.1 0.0 - 1.3 10e3/uL    Absolute Eosinophils 0.3 0.0 - 0.7 10e3/uL    Absolute Basophils 0.1 0.0 - 0.2 10e3/uL    Absolute Immature Granulocytes 0.0 <=0.4 10e3/uL    Absolute NRBCs 0.0 10e3/uL   CBC and Differential     Status: Abnormal    Narrative    The following orders were created for panel order CBC and Differential.  Procedure                               Abnormality         Status                     ---------                               -----------         ------                     CBC with platelets and d...[[id number]]  Abnormal            Final result                 Please view results for these tests on the individual orders.

## 2022-04-27 NOTE — NURSING NOTE
Pt presents to clinic today for an ER follow up.       FOOD SECURITY SCREENING QUESTIONS:    The next two questions are to help us understand your food security.  If you are feeling you need any assistance in this area, we have resources available to support you today.    Hunger Vital Signs:  Within the past 12 months we worried whether our food would run out before we got money to buy more. Never  Within the past 12 months the food we bought just didn't last and we didn't have money to get more. Never            Medication Reconciliation: complete  Ingrid Baker LPN,LPN on 4/27/2022 at 8:51 AM

## 2022-04-27 NOTE — LETTER
April 27, 2022      Isabel Talbot  [address] [facility] [id number]        Dear ,    We are writing to inform you of your test results.    Your white blood cell count, hemoglobin, sodium, kidney function, blood sugar, and magnesium are in the normal range.     Your potassium level was still decreased at 2.8.  I sent a potassium supplement to the pharmacy to take over the next 10 days.  I recommend having your potassium level rechecked in clinic in 1 to 2 weeks to ensure that it is returning in the normal range.  Please set up a lab only appointment to have your potassium level rechecked.    Resulted Orders   Magnesium   Result Value Ref Range    Magnesium 2.0 1.9 - 2.7 mg/dL   Basic Metabolic Panel   Result Value Ref Range    Sodium 136 134 - 144 mmol/L    Potassium 2.8 (L) 3.5 - 5.1 mmol/L    Chloride 99 98 - 107 mmol/L    Carbon Dioxide (CO2) 27 21 - 31 mmol/L    Anion Gap 10 3 - 14 mmol/L    Urea Nitrogen 10 7 - 25 mg/dL    Creatinine 0.79 0.60 - 1.20 mg/dL    Calcium 9.3 8.6 - 10.3 mg/dL    Glucose 82 70 - 105 mg/dL    GFR Estimate 86 >60 mL/min/1.73m2      Comment:      Effective December 21, 2021 eGFRcr in adults is calculated using the 2021 CKD-EPI creatinine equation which includes age and gender (Frank et al., [facility], DOI: 10.1056/QQZPdn8428527)   CBC with platelets and differential   Result Value Ref Range    WBC Count 6.9 4.0 - 11.0 10e3/uL    RBC Count 3.65 (L) 3.80 - 5.20 10e6/uL    Hemoglobin 11.3 (L) 11.7 - 15.7 g/dL    Hematocrit 32.5 (L) 35.0 - 47.0 %    MCV 89 78 - 100 fL    MCH 31.0 26.5 - 33.0 pg    MCHC 34.8 31.5 - 36.5 g/dL    RDW 13.3 10.0 - 15.0 %    Platelet Count 365 150 - 450 10e3/uL    % Neutrophils 59 %    % Lymphocytes 21 %    % Monocytes 15 %    % Eosinophils 4 %    % Basophils 1 %    % Immature Granulocytes 0 %    NRBCs per 100 WBC 0 <1 /100    Absolute Neutrophils 4.0 1.6 - 8.3 10e3/uL    Absolute Lymphocytes 1.5 0.8 - 5.3 10e3/uL    Absolute Monocytes  1.1 0.0 - 1.3 10e3/uL    Absolute Eosinophils 0.3 0.0 - 0.7 10e3/uL    Absolute Basophils 0.1 0.0 - 0.2 10e3/uL    Absolute Immature Granulocytes 0.0 <=0.4 10e3/uL    Absolute NRBCs 0.0 10e3/uL       If you have any questions or concerns, please call the clinic at the number listed above.       Sincerely,      Leela Carrion PA-C

## 2022-05-13 ENCOUNTER — LAB (OUTPATIENT)
Dept: LAB | Facility: OTHER | Age: 57
End: 2022-05-13
Attending: PHYSICIAN ASSISTANT
Payer: COMMERCIAL

## 2022-05-13 ENCOUNTER — TELEPHONE (OUTPATIENT)
Dept: FAMILY MEDICINE | Facility: OTHER | Age: 57
End: 2022-05-13

## 2022-05-13 DIAGNOSIS — E87.6 HYPOKALEMIA: Primary | ICD-10-CM

## 2022-05-13 DIAGNOSIS — E87.6 HYPOKALEMIA: ICD-10-CM

## 2022-05-13 LAB — POTASSIUM BLD-SCNC: 3.2 MMOL/L (ref 3.5–5.1)

## 2022-05-13 PROCEDURE — 84132 ASSAY OF SERUM POTASSIUM: CPT | Mod: ZL

## 2022-05-13 PROCEDURE — 36415 COLL VENOUS BLD VENIPUNCTURE: CPT | Mod: ZL

## 2022-05-13 RX ORDER — POTASSIUM CHLORIDE 1500 MG/1
TABLET, EXTENDED RELEASE ORAL
Qty: 40 TABLET | Refills: 0 | Status: SHIPPED | OUTPATIENT
Start: 2022-05-13 | End: 2022-06-12

## 2022-05-13 NOTE — CONFIDENTIAL NOTE
Your potassium has improved up to 3.2 however it is still decreased.  I refilled the potassium to the pharmacy for you to take twice daily for 10 days and then once daily for 20 days to help improve your potassium level.  Leela aCrrion PA-C.......... 5/13/2022 12:37 PM

## 2022-05-24 DIAGNOSIS — E87.6 HYPOKALEMIA: ICD-10-CM

## 2022-05-24 DIAGNOSIS — A04.72 COLITIS DUE TO CLOSTRIDIUM DIFFICILE: ICD-10-CM

## 2022-05-26 NOTE — TELEPHONE ENCOUNTER
Routing refill request to provider for review/approval because:    LOV: 4/27/22  Domitila Mcguire RN on 5/26/2022 at 3:39 PM

## 2022-05-26 NOTE — TELEPHONE ENCOUNTER
Please call patient and find out why she is requesting refill of vancomycin.    According to the emergency room note, if she is having new or recurrent symptoms - she should be reevaluated.    Electronically signed by:  Bakari Guillen MD  on May 26, 2022 4:19 PM

## 2022-05-27 RX ORDER — VANCOMYCIN HYDROCHLORIDE 125 MG/1
CAPSULE ORAL
Qty: 100 CAPSULE | Refills: 0 | OUTPATIENT
Start: 2022-05-27

## 2022-05-27 NOTE — TELEPHONE ENCOUNTER
Patient returned call, verification of name and . Patient states RX request is of error, was looking for differing RX from [facility] and will call pharmacy to obtain. No further questions at this time. Unable to complete prescription refill per RN Medication Refill Policy.................... Samina Urrutia RN ....................  2022   1:09 PM

## 2022-06-20 DIAGNOSIS — A04.72 COLITIS DUE TO CLOSTRIDIUM DIFFICILE: ICD-10-CM

## 2022-06-20 DIAGNOSIS — E87.6 HYPOKALEMIA: ICD-10-CM

## 2022-06-22 RX ORDER — VANCOMYCIN HYDROCHLORIDE 125 MG/1
CAPSULE ORAL
Qty: 100 CAPSULE | Refills: 0 | OUTPATIENT
Start: 2022-06-22

## 2022-07-05 DIAGNOSIS — I10 BENIGN ESSENTIAL HYPERTENSION: ICD-10-CM

## 2022-07-07 NOTE — TELEPHONE ENCOUNTER
"Requested Prescriptions   Pending Prescriptions Disp Refills     amLODIPine (NORVASC) 10 MG tablet [Pharmacy Med Name: amLODIPine Besylate 10 MG Oral Tablet] 90 tablet 0     Sig: Take 1 tablet by mouth once daily       Calcium Channel Blockers Protocol  Passed - 7/5/2022  6:26 PM        Passed - Blood pressure under 140/90 in past 12 months     BP Readings from Last 3 Encounters:   04/27/22 106/78   04/21/22 139/63   04/18/22 129/56           Passed - Recent (12 mo) or future (30 days) visit within the authorizing provider's specialty     Patient has had an office visit with the authorizing provider or a provider within the authorizing providers department within the previous 12 mos or has a future within next 30 days. See \"Patient Info\" tab in inbasket, or \"Choose Columns\" in Meds & Orders section of the refill encounter.              Passed - Medication is active on med list        Passed - Patient is age 18 or older        Passed - No active pregnancy on record        Passed - Normal serum creatinine on file in past 12 months     Recent Labs   Lab Test 04/27/22  0928   CR 0.79   Ok to refill medication if creatinine is low        Passed - No positive pregnancy test in past 12 months           amLODIPine (NORVASC) 10 MG tablet     Sig: Take 1 tablet by mouth once daily           Last Written Prescription Date:  7/20/21  Last Fill Quantity: 90,   # refills: 3  Last Office Visit: 4/27/22  Future Office visit:       Routing refill request to provider for review/approval because:  Drug not on the G, P or [facility] refill protocol or controlled substance Aide Mendez RN on 7/7/2022 at 12:08 PM        "

## 2022-07-07 NOTE — TELEPHONE ENCOUNTER
AMETCB to unit 3 to schedule annual visit for refills, once scheduled we would need to know if she needs a refill sent to make it to appt. Date.     Catarina Salcedo on 7/7/2022 at 2:12 PM

## 2022-07-07 NOTE — TELEPHONE ENCOUNTER
Overdue for Annual visit / physical. ---- After 7/21/2022.     Please call patient and schedule.  Okay to use 4 PM -- 40 minute spot at the end of the day if needed.    We can send in a small Prescription refill if needed to get to appointment.     Bakari Guillen MD

## 2022-07-08 NOTE — TELEPHONE ENCOUNTER
LMTCBx2  to unit 3 to schedule annual visit for refills, once scheduled we would need to know if she needs a refill sent to make it to appt. Date.     Catarina Salcedo on 7/8/2022 at 2:17 PM

## 2022-07-11 NOTE — TELEPHONE ENCOUNTER
3rd and final attempt to contact patient to notify of provider response. Can a letter be sent?     LMTCBx3  to unit 3 to schedule annual visit for refills, once scheduled we would need to know if she needs a refill sent to make it to appt date.     Catarina Salcedo on 7/11/2022 at 2:15 PM

## 2022-07-12 RX ORDER — AMLODIPINE BESYLATE 10 MG/1
TABLET ORAL
Qty: 7 TABLET | Refills: 5 | Status: SHIPPED | OUTPATIENT
Start: 2022-07-12 | End: 2022-08-16

## 2022-07-21 NOTE — TELEPHONE ENCOUNTER
Appointment scheduled for 8/16/22.    Catarina Hewitt CMA([facility])..................7/21/2022   5:29 PM

## 2022-08-16 ENCOUNTER — OFFICE VISIT (OUTPATIENT)
Dept: INTERNAL MEDICINE | Facility: OTHER | Age: 57
End: 2022-08-16
Attending: INTERNAL MEDICINE
Payer: COMMERCIAL

## 2022-08-16 VITALS
HEART RATE: 67 BPM | TEMPERATURE: 97.6 F | BODY MASS INDEX: 22.36 KG/M2 | DIASTOLIC BLOOD PRESSURE: 74 MMHG | RESPIRATION RATE: 16 BRPM | OXYGEN SATURATION: 97 % | SYSTOLIC BLOOD PRESSURE: 104 MMHG | WEIGHT: 134.2 LBS | HEIGHT: 65 IN

## 2022-08-16 DIAGNOSIS — I10 BENIGN ESSENTIAL HYPERTENSION: ICD-10-CM

## 2022-08-16 DIAGNOSIS — F17.200 NICOTINE DEPENDENCE, UNCOMPLICATED, UNSPECIFIED NICOTINE PRODUCT TYPE: ICD-10-CM

## 2022-08-16 DIAGNOSIS — M05.79 RHEUMATOID ARTHRITIS INVOLVING MULTIPLE SITES WITH POSITIVE RHEUMATOID FACTOR (H): ICD-10-CM

## 2022-08-16 DIAGNOSIS — Z79.899 ENCOUNTER FOR MONITORING ANTI-ARRHYTHMIC THERAPY: ICD-10-CM

## 2022-08-16 DIAGNOSIS — K52.831 COLLAGENOUS COLITIS: ICD-10-CM

## 2022-08-16 DIAGNOSIS — I47.10 PSVT (PAROXYSMAL SUPRAVENTRICULAR TACHYCARDIA) (H): ICD-10-CM

## 2022-08-16 DIAGNOSIS — F32.5 MAJOR DEPRESSIVE DISORDER WITH SINGLE EPISODE, IN FULL REMISSION (H): ICD-10-CM

## 2022-08-16 DIAGNOSIS — K52.9 CHRONIC DIARRHEA: ICD-10-CM

## 2022-08-16 DIAGNOSIS — I48.0 PAROXYSMAL ATRIAL FIBRILLATION (H): ICD-10-CM

## 2022-08-16 DIAGNOSIS — Z51.81 ENCOUNTER FOR MONITORING ANTI-ARRHYTHMIC THERAPY: ICD-10-CM

## 2022-08-16 DIAGNOSIS — E87.6 HYPOKALEMIA: Primary | ICD-10-CM

## 2022-08-16 DIAGNOSIS — Z72.0 TOBACCO ABUSE: ICD-10-CM

## 2022-08-16 DIAGNOSIS — E55.9 VITAMIN D DEFICIENCY: ICD-10-CM

## 2022-08-16 DIAGNOSIS — Z71.85 VACCINE COUNSELING: ICD-10-CM

## 2022-08-16 DIAGNOSIS — E78.2 MIXED HYPERLIPIDEMIA: ICD-10-CM

## 2022-08-16 DIAGNOSIS — Z23 NEED FOR VACCINATION FOR PNEUMOCOCCUS: ICD-10-CM

## 2022-08-16 DIAGNOSIS — Z79.01 CURRENT USE OF LONG TERM ANTICOAGULATION: ICD-10-CM

## 2022-08-16 DIAGNOSIS — Z00.00 ANNUAL PHYSICAL EXAM: ICD-10-CM

## 2022-08-16 PROBLEM — T78.3XXA ANGIOEDEMA: Status: RESOLVED | Noted: 2019-10-22 | Resolved: 2022-08-16

## 2022-08-16 PROBLEM — R00.1 BRADYCARDIA: Status: RESOLVED | Noted: 2018-06-28 | Resolved: 2022-08-16

## 2022-08-16 PROCEDURE — 90677 PCV20 VACCINE IM: CPT | Performed by: INTERNAL MEDICINE

## 2022-08-16 PROCEDURE — 99396 PREV VISIT EST AGE 40-64: CPT | Mod: 25 | Performed by: INTERNAL MEDICINE

## 2022-08-16 PROCEDURE — 90471 IMMUNIZATION ADMIN: CPT | Performed by: INTERNAL MEDICINE

## 2022-08-16 PROCEDURE — 91305 COVID-19,PF,PFIZER (12+ YRS): CPT | Performed by: INTERNAL MEDICINE

## 2022-08-16 PROCEDURE — 0054A COVID-19,PF,PFIZER (12+ YRS): CPT | Performed by: INTERNAL MEDICINE

## 2022-08-16 PROCEDURE — 99214 OFFICE O/P EST MOD 30 MIN: CPT | Mod: 25 | Performed by: INTERNAL MEDICINE

## 2022-08-16 RX ORDER — CHOLECALCIFEROL (VITAMIN D3) 50 MCG
1 TABLET ORAL DAILY
Qty: 100 TABLET | Refills: 4 | Status: SHIPPED | OUTPATIENT
Start: 2022-08-16

## 2022-08-16 RX ORDER — ROSUVASTATIN CALCIUM 20 MG/1
20 TABLET, COATED ORAL DAILY
Qty: 90 TABLET | Refills: 4 | Status: SHIPPED | OUTPATIENT
Start: 2022-08-16

## 2022-08-16 RX ORDER — CITALOPRAM HYDROBROMIDE 20 MG/1
20 TABLET ORAL DAILY
Qty: 90 TABLET | Refills: 4 | Status: SHIPPED | OUTPATIENT
Start: 2022-08-16

## 2022-08-16 RX ORDER — AMLODIPINE BESYLATE 10 MG/1
10 TABLET ORAL DAILY
Qty: 90 TABLET | Refills: 4 | Status: SHIPPED | OUTPATIENT
Start: 2022-08-16

## 2022-08-16 RX ORDER — PROPAFENONE HYDROCHLORIDE 150 MG/1
150 TABLET, COATED ORAL 3 TIMES DAILY
Qty: 270 TABLET | Refills: 4 | Status: SHIPPED | OUTPATIENT
Start: 2022-08-16

## 2022-08-16 RX ORDER — POTASSIUM CHLORIDE 20MEQ/15ML
40 LIQUID (ML) ORAL DAILY
Qty: 3800 ML | Refills: 4 | Status: SHIPPED | OUTPATIENT
Start: 2022-08-16

## 2022-08-16 ASSESSMENT — ENCOUNTER SYMPTOMS
BRUISES/BLEEDS EASILY: 1
WHEEZING: 0
HEMATURIA: 0
HEADACHES: 0
NERVOUS/ANXIOUS: 0
DIARRHEA: 0
MYALGIAS: 0
CHILLS: 0
AGITATION: 0
VOMITING: 0
LIGHT-HEADEDNESS: 0
WOUND: 0
JOINT SWELLING: 0
DIZZINESS: 0
HEMATOCHEZIA: 0
PALPITATIONS: 0
EYE PAIN: 0
SORE THROAT: 0
HEARTBURN: 0
PALPITATIONS: 1
ABDOMINAL PAIN: 0
CONFUSION: 0
WEAKNESS: 0
NAUSEA: 0
COUGH: 0
BREAST MASS: 0
FREQUENCY: 0
ARTHRALGIAS: 0
FEVER: 0
PARESTHESIAS: 0
ARTHRALGIAS: 1
DYSURIA: 0
CONSTIPATION: 0
SHORTNESS OF BREATH: 0

## 2022-08-16 ASSESSMENT — ANXIETY QUESTIONNAIRES
5. BEING SO RESTLESS THAT IT IS HARD TO SIT STILL: NOT AT ALL
GAD7 TOTAL SCORE: 0
1. FEELING NERVOUS, ANXIOUS, OR ON EDGE: NOT AT ALL
IF YOU CHECKED OFF ANY PROBLEMS ON THIS QUESTIONNAIRE, HOW DIFFICULT HAVE THESE PROBLEMS MADE IT FOR YOU TO DO YOUR WORK, TAKE CARE OF THINGS AT HOME, OR GET ALONG WITH OTHER PEOPLE: NOT DIFFICULT AT ALL
GAD7 TOTAL SCORE: 0
7. FEELING AFRAID AS IF SOMETHING AWFUL MIGHT HAPPEN: NOT AT ALL
3. WORRYING TOO MUCH ABOUT DIFFERENT THINGS: NOT AT ALL
7. FEELING AFRAID AS IF SOMETHING AWFUL MIGHT HAPPEN: NOT AT ALL
4. TROUBLE RELAXING: NOT AT ALL
6. BECOMING EASILY ANNOYED OR IRRITABLE: NOT AT ALL
8. IF YOU CHECKED OFF ANY PROBLEMS, HOW DIFFICULT HAVE THESE MADE IT FOR YOU TO DO YOUR WORK, TAKE CARE OF THINGS AT HOME, OR GET ALONG WITH OTHER PEOPLE?: NOT DIFFICULT AT ALL
2. NOT BEING ABLE TO STOP OR CONTROL WORRYING: NOT AT ALL

## 2022-08-16 ASSESSMENT — PAIN SCALES - GENERAL: PAINLEVEL: NO PAIN (0)

## 2022-08-16 NOTE — PATIENT INSTRUCTIONS
Start potassium replacement.     Covid shot #4 today.     Pneumococcal PCV 20 shot today.     QUIT SMOKING!!    Blood pressure is well controlled.     Medications refilled.     Return in approximately 1 year, or sooner as needed for follow-up with Dr. Guillen.  - Annual Follow-up / Physical    Clinic : [phone]  Appointment line: [phone]

## 2022-08-16 NOTE — PROGRESS NOTES
SUBJECTIVE:   CC: Isabel Talbot is an 57 year old woman who presents for preventive health visit.     Patient has been advised of split billing requirements and indicates understanding: Yes  Healthy Habits:     Getting at least 3 servings of Calcium per day:  NO    Bi-annual eye exam:  Yes    Dental care twice a year:  NO    Sleep apnea or symptoms of sleep apnea:  None    Diet:  Regular (no restrictions)    Duration of exercise:  15-30 minutes    Taking medications regularly:  Yes    Medication side effects:  None    PHQ-2 Total Score: 0    Additional concerns today:  No    Today's PHQ-2 Score:   PHQ-2 ( 1999 Pfizer) 8/16/2022   Q1: Little interest or pleasure in doing things 0   Q2: Feeling down, depressed or hopeless 0   PHQ-2 Score 0   PHQ-2 Total Score (12-17 Years)- Positive if 3 or more points; Administer PHQ-A if positive -   Q1: Little interest or pleasure in doing things Not at all   Q2: Feeling down, depressed or hopeless Not at all   PHQ-2 Score 0       Abuse: Current or Past (Physical, Sexual or Emotional) - NO  Do you feel safe in your environment? NO        Social History     Tobacco Use     Smoking status: Current Every Day Smoker     Packs/day: 0.50     Years: 38.00     Pack years: 19.00     Types: Cigarettes     Smokeless tobacco: Never Used   Substance Use Topics     Alcohol use: Yes     Comment: occasional.-wine/hard liquor 2 times week     Alcohol Use 8/16/2022   Prescreen: >3 drinks/day or >7 drinks/week? No     Reviewed orders with patient.  Reviewed health maintenance and updated orders accordingly - Yes    Breast Cancer Screening:  Any new diagnosis of family breast, ovarian, or bowel cancer? No    FHS-7:   Breast CA Risk Assessment (FHS-7) 11/19/2021   Did any of your first-degree relatives have breast or ovarian cancer? Yes   Did any of your relatives have bilateral breast cancer? No   Did any man in your family have breast cancer? No   Did any woman in your family have breast and  ovarian cancer? No   Did any woman in your family have breast cancer before age 50 y? No   Do you have 2 or more relatives with breast and/or ovarian cancer? No   Do you have 2 or more relatives with breast and/or bowel cancer? No     Mammogram Screening: Recommended mammography every 1-2 years with patient discussion and risk factor consideration  Pertinent mammograms are reviewed under the imaging tab.    History of abnormal Pap smear: NO - age 30-65 PAP every 5 years with negative HPV co-testing recommended  PAP / HPV Latest Ref Rng & Units 3/1/2018   HPV16 NEG:Negative Negative   HPV18 NEG:Negative Negative   HRHPV NEG:Negative Negative     Reviewed and updated as needed this visit by clinical staff   Tobacco  Allergies  Meds  Problems  Med Hx  Surg Hx  Fam Hx  Soc   Hx          Reviewed and updated as needed this visit by Provider   Tobacco  Allergies  Meds  Problems  Med Hx  Surg Hx  Fam Hx             Review of Systems   Constitutional: Negative for chills and fever.   HENT: Negative for congestion, ear pain, hearing loss and sore throat.         + poor teeth   Eyes: Negative for pain and visual disturbance.   Respiratory: Negative for cough, shortness of breath and wheezing.         + working to quit smoking   Cardiovascular: Positive for palpitations (now rarely). Negative for chest pain and peripheral edema.   Gastrointestinal: Negative for abdominal pain, constipation, diarrhea, heartburn, hematochezia, nausea and vomiting.   Endocrine: Negative for cold intolerance and heat intolerance.   Breasts:  Negative for tenderness, breast mass and discharge.   Genitourinary: Negative for dysuria, frequency, genital sores, hematuria, pelvic pain, urgency, vaginal bleeding and vaginal discharge.   Musculoskeletal: Negative for arthralgias, joint swelling and myalgias.   Skin: Negative for rash and wound.   Allergic/Immunologic: Positive for immunocompromised state.   Neurological: Negative for  "dizziness, weakness, light-headedness, headaches and paresthesias.   Hematological: Bruises/bleeds easily.   Psychiatric/Behavioral: Negative for agitation, confusion and mood changes. The patient is not nervous/anxious.         Anxiety and depression are doing much better with citalopram        OBJECTIVE:   /74 (BP Location: Right arm, Patient Position: Sitting, Cuff Size: Adult Regular)   Pulse 67   Temp 97.6  F (36.4  C) (Temporal)   Resp 16   Ht 1.651 m (5' 5\")   Wt 60.9 kg (134 lb 3.2 oz)   LMP  (LMP Unknown)   SpO2 97%   Breastfeeding No   BMI 22.33 kg/m    Physical Exam  Constitutional:       General: She is not in acute distress.     Appearance: She is well-developed. She is not diaphoretic.   HENT:      Head: Normocephalic and atraumatic.   Eyes:      General: No scleral icterus.     Conjunctiva/sclera: Conjunctivae normal.   Neck:      Vascular: No carotid bruit.   Cardiovascular:      Rate and Rhythm: Normal rate and regular rhythm.      Pulses: Normal pulses.   Pulmonary:      Effort: Pulmonary effort is normal.      Breath sounds: Wheezing present. No rhonchi.   Abdominal:      Palpations: Abdomen is soft.      Tenderness: There is no abdominal tenderness.   Musculoskeletal:         General: No deformity.      Cervical back: Neck supple.      Right lower leg: No edema.      Left lower leg: No edema.   Lymphadenopathy:      Cervical: No cervical adenopathy.   Skin:     General: Skin is warm and dry.      Coloration: Skin is not jaundiced.      Findings: No rash.   Neurological:      Mental Status: She is alert and oriented to person, place, and time. Mental status is at baseline.   Psychiatric:         Mood and Affect: Mood normal.         Behavior: Behavior normal.       Diagnostic Test Results:  Labs reviewed in Epic    ASSESSMENT/PLAN:       ICD-10-CM    1. Hypokalemia  E87.6 potassium chloride (KAYCIEL) 20 MEQ/15ML (10%) solution   2. Annual physical exam  Z00.00    3. Paroxysmal " atrial fibrillation (H)  I48.0 apixaban ANTICOAGULANT (ELIQUIS ANTICOAGULANT) 5 MG tablet     propafenone (RYTHMOL) 150 MG TABS tablet   4. Current use of long term anticoagulation - Apixaban  Z79.01    5. Mixed hyperlipidemia  E78.2 rosuvastatin (CRESTOR) 20 MG tablet   6. Collagenous colitis  K52.831    7. Chronic diarrhea  K52.9    8. Rheumatoid arthritis involving multiple sites with positive rheumatoid factor (H)  M05.79    9. Vitamin D deficiency  E55.9 vitamin D3 (CHOLECALCIFEROL) 50 mcg (2000 units) tablet   10. Major depressive disorder with single episode, in full remission (H)  F32.5 citalopram (CELEXA) 20 MG tablet   11. Tobacco abuse  Z72.0    12. Nicotine dependence, uncomplicated, unspecified nicotine product type  F17.200    13. Benign essential hypertension  I10 amLODIPine (NORVASC) 10 MG tablet   14. PSVT (paroxysmal supraventricular tachycardia) (H)  I47.1 apixaban ANTICOAGULANT (ELIQUIS ANTICOAGULANT) 5 MG tablet     propafenone (RYTHMOL) 150 MG TABS tablet   15. Encounter for monitoring anti-arrhythmic therapy  Z51.81 propafenone (RYTHMOL) 150 MG TABS tablet    Z79.899    16. Need for vaccination for pneumococcus  Z23    17. Vaccine counseling  Z71.85    Patient presents for annual physical.  Overall reports doing quite well.    Paroxysmal atrial fibrillation.  Chronic.  Ongoing.  Heart rates have been controlled since using Rythmol and apixaban.  Needs refills.  Tolerating well.    Mixed hyperlipidemia.  Currently taking Crestor 20 mg daily.  Seems to be tolerating well without side effects.  She does not have heart disease.  Cholesterol levels are now at goal.  Continue current dosing.  Recent Labs   Lab Test 09/29/20  0912 07/25/19  1355   CHOL 179 249*   HDL 87 107*   LDL 81 129*   TRIG 54 64     Chronic diarrhea with collagenous colitis.  Chronic.  Ongoing.  Uses over-the-counter medication twice daily which has been quite effective for her loose stools.  Okay to continue as  "noted.    Rheumatoid arthritis, chronic.  Multiple joint involvement.  Follows with her rheumatologist.  Patient is chronically immunosuppressed.    Vitamin D deficiency.  Ongoing.  No longer taking vitamin D supplement.  Recommend restarting.  She has fallen few times.  Needs refills.    History of depression, currently in remission.  Doing well with citalopram.  Tolerating well without side effects.  Refills sent to pharmacy.  Continue current dosing.    Tobacco abuse.  Ongoing.  She is not ready quit smoking at this time.  Encouraged smoking cessation.    Hypertension.  Blood pressure is currently well controlled.  Denies syncope or presyncope.  Needs refills of amlodipine.    PSVT, see above.  Continue with apixaban and Rythmol.    Pneumococcal vaccination is due.  She would like this today.    COVID fourth vaccination is due.  She would like this today.    Hypokalemia.  Chronic.  Has been an ongoing problem for years.  Was on oral potassium for a while.  Start oral potassium replacement, liquid given that she has chronic diarrhea.  40 mEq once daily.  Consider lab recheck in 2 to 4 weeks.    Vaccine counseling completed.  Encouraged consideration of flu shot in the fall.    Patient has been advised of split billing requirements and indicates understanding: Yes    COUNSELING:  Reviewed preventive health counseling, as reflected in patient instructions  Special attention given to:        Regular exercise       Healthy diet/nutrition       Vision screening       Hearing screening       Immunizations    Estimated body mass index is 22.33 kg/m  as calculated from the following:    Height as of this encounter: 1.651 m (5' 5\").    Weight as of this encounter: 60.9 kg (134 lb 3.2 oz).        She reports that she has been smoking cigarettes. She has a 19.00 pack-year smoking history. She has never used smokeless tobacco.  Nicotine/Tobacco Cessation Plan:   Information offered: Patient not interested at this " time      Counseling Resources:  ATP IV Guidelines  Pooled Cohorts Equation Calculator  Breast Cancer Risk Calculator  BRCA-Related Cancer Risk Assessment: FHS-7 Tool  FRAX Risk Assessment  ICSI Preventive Guidelines  Dietary Guidelines for Americans, 2010  USDA's MyPlate  ASA Prophylaxis  Lung CA Screening    Bakari Guillen MD  [facility]  Answers for HPI/ROS submitted by the patient on 8/16/2022  NELLY 7 TOTAL SCORE: 0

## 2022-08-16 NOTE — NURSING NOTE
"Chief Complaint   Patient presents with     Physical         Initial /74 (BP Location: Right arm, Patient Position: Sitting, Cuff Size: Adult Regular)   Pulse 67   Temp 97.6  F (36.4  C) (Temporal)   Resp 16   Ht 1.651 m (5' 5\")   Wt 60.9 kg (134 lb 3.2 oz)   LMP  (LMP Unknown)   SpO2 97%   Breastfeeding No   BMI 22.33 kg/m   Estimated body mass index is 22.33 kg/m  as calculated from the following:    Height as of this encounter: 1.651 m (5' 5\").    Weight as of this encounter: 60.9 kg (134 lb 3.2 oz).       FOOD SECURITY SCREENING QUESTIONS:    The next two questions are to help us understand your food security.  If you are feeling you need any assistance in this area, we have resources available to support you today.    Hunger Vital Signs:  Within the past 12 months we worried whether our food would run out before we got money to buy more. no  Within the past 12 months the food we bought just didn't last and we didn't have money to get more. no    Advance Care Directive on file? no      Medication reconciliation complete.      Zack Zaldivar,on 8/16/2022 at 3:49 PM        "

## 2022-09-02 ENCOUNTER — OFFICE VISIT (OUTPATIENT)
Dept: FAMILY MEDICINE | Facility: OTHER | Age: 57
End: 2022-09-02
Attending: FAMILY MEDICINE
Payer: COMMERCIAL

## 2022-09-02 VITALS
RESPIRATION RATE: 16 BRPM | TEMPERATURE: 97.2 F | WEIGHT: 132.6 LBS | SYSTOLIC BLOOD PRESSURE: 130 MMHG | OXYGEN SATURATION: 96 % | HEART RATE: 54 BPM | BODY MASS INDEX: 22.09 KG/M2 | HEIGHT: 65 IN | DIASTOLIC BLOOD PRESSURE: 88 MMHG

## 2022-09-02 DIAGNOSIS — J34.89 NASAL MUCOSA DRY: Primary | ICD-10-CM

## 2022-09-02 PROCEDURE — 99213 OFFICE O/P EST LOW 20 MIN: CPT | Performed by: FAMILY MEDICINE

## 2022-09-02 RX ORDER — LEFLUNOMIDE 20 MG/1
20 TABLET ORAL DAILY
COMMUNITY
Start: 2022-07-05 | End: 2022-09-02

## 2022-09-02 ASSESSMENT — ENCOUNTER SYMPTOMS
TROUBLE SWALLOWING: 0
FEVER: 0
SORE THROAT: 0
COUGH: 0
RHINORRHEA: 0
SHORTNESS OF BREATH: 0
CHILLS: 0

## 2022-09-02 ASSESSMENT — PATIENT HEALTH QUESTIONNAIRE - PHQ9: SUM OF ALL RESPONSES TO PHQ QUESTIONS 1-9: 0

## 2022-09-02 ASSESSMENT — PAIN SCALES - GENERAL: PAINLEVEL: NO PAIN (0)

## 2022-09-02 NOTE — NURSING NOTE
"Chief Complaint   Patient presents with     Derm Problem     Scab in nostrils x 3 weeks- painful         Initial /88   Pulse 54   Temp 97.2  F (36.2  C) (Tympanic)   Resp 16   Ht 1.651 m (5' 5\")   Wt 60.1 kg (132 lb 9.6 oz)   LMP  (LMP Unknown)   SpO2 96%   Breastfeeding No   BMI 22.07 kg/m   Estimated body mass index is 22.07 kg/m  as calculated from the following:    Height as of this encounter: 1.651 m (5' 5\").    Weight as of this encounter: 60.1 kg (132 lb 9.6 oz).         Norma J. Gosselin, LPN   "

## 2022-09-02 NOTE — PROGRESS NOTES
"Assessment & Plan     Nasal mucosa dry  Dry nasal mucosa without known trigger.  May be related to her medications for rheumatoid arthritis, as nasal dryness can be an adverse effects; however, she has been on these medications for several years.  Recommend initial treatment with nasal saline spray twice daily.  Avoid blowing nose, using tissues, or inserting fingers.  Consider use of humidifier during winter months and masks/scarves when going outside.  Follow-up if symptoms worsening or failing to improve.    Radha Ferguson,   [facility]    Subjective   Isabel is a 57 year old, presenting for the following health issues:  Derm Problem (Scab in nostrils x 3 weeks- painful)      History of Present Illness       Reason for visit:  Nose has scabs and super dry  Symptom onset:  3-4 weeks ago  Symptoms include:  Scabs and trouble breathing thru nose  Symptom intensity:  Severe  Symptom progression:  Staying the same  Had these symptoms before:  No    She eats 0-1 servings of fruits and vegetables daily.She consumes 0 sweetened beverage(s) daily.She exercises with enough effort to increase her heart rate 30 to 60 minutes per day.  She exercises with enough effort to increase her heart rate 6 days per week.   She is taking medications regularly.     She reports scabs in her nose as well as dry nasal mucosa.  She reports bleeding with every time she blows her nose.  She has tried to limit this to once per day due to the pain.  These symptoms have been present for the past month with some gradual worsening.  Her symptoms have not significantly changed over the past couple of weeks.  She has tried putting Neosporin in her nose without improvement.  She has tried nothing else for her symptoms.  She has never had similar symptoms in the past.  She is a current smoker of 0.5 PPD.  She drinks \"a couple\" of alcoholic beverages on the weekend.  She denies drinking > 3 drinks per sitting or > 7 drinks per " "week.  She has not snorted any medications or illicit substances in > 30 years.  She takes Enbrel and Arava for her rheumatoid arthritis, which she reports is moderately well-controlled.  She is established with a nurse practitioner in [place].  She does not have air conditioning in her home.    Review of Systems   Constitutional: Negative for chills and fever.   HENT: Positive for congestion. Negative for ear pain, postnasal drip, rhinorrhea, sore throat and trouble swallowing.    Respiratory: Negative for cough and shortness of breath.          Objective    /88   Pulse 54   Temp 97.2  F (36.2  C) (Tympanic)   Resp 16   Ht 1.651 m (5' 5\")   Wt 60.1 kg (132 lb 9.6 oz)   LMP  (LMP Unknown)   SpO2 96%   Breastfeeding No   BMI 22.07 kg/m    Body mass index is 22.07 kg/m .  Physical Exam  Constitutional:       General: She is not in acute distress.     Appearance: Normal appearance. She is not ill-appearing.   HENT:      Right Ear: Tympanic membrane normal.      Left Ear: Tympanic membrane normal.      Nose:      Comments: Nasal mucosa appears dry with scabs present.  No erythema or purulent drainage.     Mouth/Throat:      Mouth: Mucous membranes are moist.      Pharynx: Oropharynx is clear. No oropharyngeal exudate or posterior oropharyngeal erythema.   Cardiovascular:      Rate and Rhythm: Normal rate and regular rhythm.      Heart sounds: No murmur heard.    No friction rub. No gallop.   Pulmonary:      Effort: Pulmonary effort is normal.      Breath sounds: Normal breath sounds. No wheezing, rhonchi or rales.   Musculoskeletal:      Cervical back: Neck supple. No tenderness.   Lymphadenopathy:      Cervical: No cervical adenopathy.   Neurological:      Mental Status: She is alert.       "

## 2022-09-26 DIAGNOSIS — F32.5 MAJOR DEPRESSIVE DISORDER WITH SINGLE EPISODE, IN FULL REMISSION (H): ICD-10-CM

## 2022-09-28 RX ORDER — CITALOPRAM HYDROBROMIDE 20 MG/1
TABLET ORAL
Qty: 90 TABLET | Refills: 0 | OUTPATIENT
Start: 2022-09-28

## 2022-09-28 NOTE — TELEPHONE ENCOUNTER
"Message left on pharmacy voicemail; note in comments. Sonia An RN on 9/28/2022 at 12:58 PM    Last Prescription Date: 8/16/22  Last Qty/Refills: 90 / 4  Last Office Visit: 9/2/22 Marty  Future Office Visit: none     Requested Prescriptions   Pending Prescriptions Disp Refills     citalopram (CELEXA) 20 MG tablet [Pharmacy Med Name: Citalopram Hydrobromide 20 MG Oral Tablet] 90 tablet 0     Sig: Take 1 tablet by mouth once daily       SSRIs Protocol Passed - 9/26/2022  8:42 AM        Passed - PHQ-9 score less than 5 in past 6 months     Please review last PHQ-9 score.           Passed - Medication is active on med list        Passed - Patient is age 18 or older        Passed - No active pregnancy on record        Passed - No positive pregnancy test in last 12 months        Passed - Recent (6 mo) or future (30 days) visit within the authorizing provider's specialty     Patient had office visit in the last 6 months or has a visit in the next 30 days with authorizing provider or within the authorizing provider's specialty.  See \"Patient Info\" tab in inbasket, or \"Choose Columns\" in Meds & Orders section of the refill encounter.                 "

## 2023-07-07 NOTE — LETTER
Isabel Talbot  [address] [place] [id number]    6/28/2019      Dear Ms. Talbot,      We've received the results back from the laboratory for the samples you gave in clinic.  Your labs are normal. Please contact us at [phone] with any questions or concerns that you have.    I attached your lab results for your records.        Take Care,         Leela Carrion PA-C    Resulted Orders   CBC and Differential   Result Value Ref Range    WBC 5.9 4.0 - 11.0 10e9/L    RBC Count 3.87 3.8 - 5.2 10e12/L    Hemoglobin 12.3 11.7 - 15.7 g/dL    Hematocrit 35.9 35.0 - 47.0 %    MCV 93 78 - 100 fl    MCH 31.8 26.5 - 33.0 pg    MCHC 34.3 31.5 - 36.5 g/dL    RDW 16.0 (H) 10.0 - 15.0 %    Platelet Count 219 150 - 450 10e9/L    Diff Method Automated Method     % Neutrophils 46.8 %    % Lymphocytes 39.5 %    % Monocytes 8.5 %    % Eosinophils 3.9 %    % Basophils 1.0 %    % Immature Granulocytes 0.3 %    Absolute Neutrophil 2.8 1.6 - 8.3 10e9/L    Absolute Lymphocytes 2.3 0.8 - 5.3 10e9/L    Absolute Monocytes 0.5 0.0 - 1.3 10e9/L    Absolute Eosinophils 0.2 0.0 - 0.7 10e9/L    Absolute Basophils 0.1 0.0 - 0.2 10e9/L    Abs Immature Granulocytes 0.0 0 - 0.4 10e9/L   Comprehensive metabolic panel   Result Value Ref Range    Sodium 140 134 - 144 mmol/L    Potassium 3.6 3.5 - 5.1 mmol/L    Chloride 108 (H) 98 - 107 mmol/L    Carbon Dioxide 25 21 - 31 mmol/L    Anion Gap 7 3 - 14 mmol/L    Glucose 92 70 - 105 mg/dL    Urea Nitrogen 18 7 - 25 mg/dL    Creatinine 0.69 0.60 - 1.20 mg/dL    GFR Estimate 89 >60 mL/min/1.7m2    GFR Estimate If Black >90 >60 mL/min/1.7m2    Calcium 9.4 8.6 - 10.3 mg/dL    Bilirubin Total 0.4 0.3 - 1.0 mg/dL    Albumin 3.9 3.5 - 5.7 g/dL    Protein Total 6.5 6.4 - 8.9 g/dL    Alkaline Phosphatase 50 34 - 104 U/L    ALT 40 7 - 52 U/L    AST 25 13 - 39 U/L   Troponin I   Result Value Ref Range    Troponin I ES <0.030 0.000 - 0.034 ug/L   D-Dimer,Quantitative   Result Value Ref Range    D-Dimer  ng/mL <200 0 - 230 ng/ml D-DU                      Consultant

## (undated) DEVICE — SOL WATER 1500ML

## (undated) DEVICE — ENDO BRUSH CHANNEL MASTER CLEANING 2-4.2MM BW-412T

## (undated) DEVICE — ENDO FORCEP ENDOJAW BIOPSY 2.8MMX230CM FB-220U

## (undated) DEVICE — TUBING SUCTION 10'X3/16" N510

## (undated) DEVICE — ENDO KIT COMPLIANCE DYKENDOCMPLY

## (undated) DEVICE — SUCTION MANIFOLD NEPTUNE 2 SYS 4 PORT 0702-020-000

## (undated) RX ORDER — REGADENOSON 0.08 MG/ML
INJECTION, SOLUTION INTRAVENOUS
Status: DISPENSED
Start: 2018-07-03

## (undated) RX ORDER — MAGNESIUM SULFATE HEPTAHYDRATE 40 MG/ML
INJECTION, SOLUTION INTRAVENOUS
Status: DISPENSED
Start: 2022-04-21

## (undated) RX ORDER — AMIODARONE HYDROCHLORIDE 150 MG/3ML
INJECTION, SOLUTION INTRAVENOUS
Status: DISPENSED
Start: 2018-11-29

## (undated) RX ORDER — POTASSIUM CHLORIDE 7.45 MG/ML
INJECTION INTRAVENOUS
Status: DISPENSED
Start: 2020-01-09

## (undated) RX ORDER — SODIUM CHLORIDE 9 MG/ML
INJECTION, SOLUTION INTRAVENOUS
Status: DISPENSED
Start: 2019-10-22

## (undated) RX ORDER — LIDOCAINE HYDROCHLORIDE 20 MG/ML
INJECTION, SOLUTION EPIDURAL; INFILTRATION; INTRACAUDAL; PERINEURAL
Status: DISPENSED
Start: 2018-04-02

## (undated) RX ORDER — KETAMINE HYDROCHLORIDE 50 MG/ML
INJECTION, SOLUTION INTRAMUSCULAR; INTRAVENOUS
Status: DISPENSED
Start: 2019-01-07

## (undated) RX ORDER — SODIUM CHLORIDE 9 MG/ML
INJECTION, SOLUTION INTRAVENOUS
Status: DISPENSED
Start: 2019-01-07

## (undated) RX ORDER — PROPOFOL 10 MG/ML
INJECTION, EMULSION INTRAVENOUS
Status: DISPENSED
Start: 2018-04-02

## (undated) RX ORDER — POTASSIUM CHLORIDE 1500 MG/1
TABLET, EXTENDED RELEASE ORAL
Status: DISPENSED
Start: 2022-04-21

## (undated) RX ORDER — DIPHENHYDRAMINE HYDROCHLORIDE 50 MG/ML
INJECTION INTRAMUSCULAR; INTRAVENOUS
Status: DISPENSED
Start: 2019-10-21

## (undated) RX ORDER — FENTANYL CITRATE 50 UG/ML
INJECTION, SOLUTION INTRAMUSCULAR; INTRAVENOUS
Status: DISPENSED
Start: 2020-01-09

## (undated) RX ORDER — SODIUM CHLORIDE 9 MG/ML
INJECTION, SOLUTION INTRAVENOUS
Status: DISPENSED
Start: 2019-10-21

## (undated) RX ORDER — MORPHINE SULFATE 4 MG/ML
INJECTION, SOLUTION INTRAMUSCULAR; INTRAVENOUS
Status: DISPENSED
Start: 2020-01-09

## (undated) RX ORDER — DILTIAZEM HYDROCHLORIDE 120 MG/1
CAPSULE, COATED, EXTENDED RELEASE ORAL
Status: DISPENSED
Start: 2019-01-07

## (undated) RX ORDER — DEXAMETHASONE SODIUM PHOSPHATE 4 MG/ML
INJECTION, SOLUTION INTRA-ARTICULAR; INTRALESIONAL; INTRAMUSCULAR; INTRAVENOUS; SOFT TISSUE
Status: DISPENSED
Start: 2019-10-21

## (undated) RX ORDER — SODIUM CHLORIDE 9 MG/ML
INJECTION, SOLUTION INTRAVENOUS
Status: DISPENSED
Start: 2020-01-09

## (undated) RX ORDER — METOPROLOL TARTRATE 25 MG/1
TABLET, FILM COATED ORAL
Status: DISPENSED
Start: 2018-11-29

## (undated) RX ORDER — SODIUM CHLORIDE 9 MG/ML
INJECTION, SOLUTION INTRAVENOUS
Status: DISPENSED
Start: 2022-04-21

## (undated) RX ORDER — DILTIAZEM HYDROCHLORIDE 5 MG/ML
INJECTION INTRAVENOUS
Status: DISPENSED
Start: 2019-01-07